# Patient Record
Sex: FEMALE | Race: WHITE | NOT HISPANIC OR LATINO | Employment: UNEMPLOYED | URBAN - METROPOLITAN AREA
[De-identification: names, ages, dates, MRNs, and addresses within clinical notes are randomized per-mention and may not be internally consistent; named-entity substitution may affect disease eponyms.]

---

## 2017-04-10 ENCOUNTER — HOSPITAL ENCOUNTER (OUTPATIENT)
Dept: LAB | Facility: HOSPITAL | Age: 47
Setting detail: SPECIMEN
Discharge: HOME | End: 2017-04-10
Attending: INTERNAL MEDICINE | Admitting: INTERNAL MEDICINE

## 2017-04-10 LAB
ALBUMIN SERPL-MCNC: 3.6 G/DL (ref 3.5–4.8)
ALBUMIN/GLOB SERPL: 1.1 {RATIO} (ref 1–1.7)
ALP SERPL-CCNC: 95 IU/L (ref 32–91)
ALT SERPL-CCNC: 20 IU/L (ref 14–54)
ANION GAP SERPL CALC-SCNC: 11.9 MMOL/L (ref 10–20)
AST SERPL-CCNC: 19 IU/L (ref 15–41)
BILIRUB SERPL-MCNC: 0.4 MG/DL (ref 0.3–1.2)
BUN SERPL-MCNC: 8 MG/DL (ref 8–20)
BUN/CREAT SERPL: 8.9 (ref 5.4–26.2)
CALCIUM SERPL-MCNC: 9.4 MG/DL (ref 8.9–10.3)
CHLORIDE SERPL-SCNC: 108 MMOL/L (ref 101–111)
CHOLEST SERPL-MCNC: 182 MG/DL
CHOLEST/HDLC SERPL: 3.5 {RATIO}
CONV CO2: 27 MMOL/L (ref 22–32)
CONV LDL CHOLESTEROL DIRECT: 96 MG/DL (ref 0–100)
CONV MICROALBUM.,U,RANDOM: 2 MG/L
CONV TOTAL PROTEIN: 6.9 G/DL (ref 6.1–7.9)
CREAT 24H UR-MCNC: 192.1 MG/DL
CREAT UR-MCNC: 0.9 MG/DL (ref 0.4–1)
GLOBULIN UR ELPH-MCNC: 3.3 G/DL (ref 2.5–3.8)
GLUCOSE SERPL-MCNC: 112 MG/DL (ref 65–99)
HDLC SERPL-MCNC: 53 MG/DL
LDLC/HDLC SERPL: 1.8 {RATIO}
LIPID INTERPRETATION: ABNORMAL
MICROALBUMIN/CREAT UR: 1 UG/MG
POTASSIUM SERPL-SCNC: 3.9 MMOL/L (ref 3.6–5.1)
SODIUM SERPL-SCNC: 143 MMOL/L (ref 136–144)
TRIGL SERPL-MCNC: 279 MG/DL
TSH SERPL-ACNC: 5.41 UIU/ML (ref 0.34–5.6)
VLDLC SERPL CALC-MCNC: 33.1 MG/DL

## 2017-04-19 ENCOUNTER — CONVERSION ENCOUNTER (OUTPATIENT)
Dept: ENDOCRINOLOGY | Facility: CLINIC | Age: 47
End: 2017-04-19

## 2017-04-20 ENCOUNTER — APPOINTMENT (OUTPATIENT)
Dept: WOMENS IMAGING | Facility: HOSPITAL | Age: 47
End: 2017-04-20

## 2017-04-20 PROCEDURE — 77067 SCR MAMMO BI INCL CAD: CPT | Performed by: RADIOLOGY

## 2017-10-10 ENCOUNTER — HOSPITAL ENCOUNTER (OUTPATIENT)
Dept: LAB | Facility: HOSPITAL | Age: 47
Setting detail: SPECIMEN
Discharge: HOME | End: 2017-10-10
Attending: INTERNAL MEDICINE | Admitting: INTERNAL MEDICINE

## 2017-10-10 LAB
T4 FREE SERPL-MCNC: 0.84 NG/DL (ref 0.58–1.64)
TSH SERPL-ACNC: 2.42 UIU/ML (ref 0.34–5.6)

## 2017-10-17 ENCOUNTER — CONVERSION ENCOUNTER (OUTPATIENT)
Dept: ENDOCRINOLOGY | Facility: CLINIC | Age: 47
End: 2017-10-17

## 2017-10-30 ENCOUNTER — APPOINTMENT (OUTPATIENT)
Dept: PREADMISSION TESTING | Facility: HOSPITAL | Age: 47
End: 2017-10-30

## 2017-10-31 ENCOUNTER — APPOINTMENT (OUTPATIENT)
Dept: PREADMISSION TESTING | Facility: HOSPITAL | Age: 47
End: 2017-10-31

## 2017-10-31 VITALS
RESPIRATION RATE: 20 BRPM | BODY MASS INDEX: 32.07 KG/M2 | SYSTOLIC BLOOD PRESSURE: 103 MMHG | HEART RATE: 79 BPM | TEMPERATURE: 97.8 F | HEIGHT: 69 IN | WEIGHT: 216.5 LBS | DIASTOLIC BLOOD PRESSURE: 71 MMHG | OXYGEN SATURATION: 95 %

## 2017-10-31 LAB
ANION GAP SERPL CALCULATED.3IONS-SCNC: 18.1 MMOL/L
BASOPHILS # BLD AUTO: 0.01 10*3/MM3 (ref 0–0.2)
BASOPHILS NFR BLD AUTO: 0.1 % (ref 0–1.5)
BUN BLD-MCNC: 10 MG/DL (ref 6–20)
BUN/CREAT SERPL: 13 (ref 7–25)
CALCIUM SPEC-SCNC: 9.1 MG/DL (ref 8.6–10.5)
CHLORIDE SERPL-SCNC: 97 MMOL/L (ref 98–107)
CO2 SERPL-SCNC: 22.9 MMOL/L (ref 22–29)
CREAT BLD-MCNC: 0.77 MG/DL (ref 0.57–1)
DEPRECATED RDW RBC AUTO: 41.6 FL (ref 37–54)
EOSINOPHIL # BLD AUTO: 0 10*3/MM3 (ref 0–0.7)
EOSINOPHIL NFR BLD AUTO: 0 % (ref 0.3–6.2)
ERYTHROCYTE [DISTWIDTH] IN BLOOD BY AUTOMATED COUNT: 13.5 % (ref 11.7–13)
GFR SERPL CREATININE-BSD FRML MDRD: 80 ML/MIN/1.73
GLUCOSE BLD-MCNC: 207 MG/DL (ref 65–99)
HCG SERPL QL: NEGATIVE
HCT VFR BLD AUTO: 39.1 % (ref 35.6–45.5)
HGB BLD-MCNC: 13 G/DL (ref 11.9–15.5)
IMM GRANULOCYTES # BLD: 0.04 10*3/MM3 (ref 0–0.03)
IMM GRANULOCYTES NFR BLD: 0.4 % (ref 0–0.5)
LYMPHOCYTES # BLD AUTO: 5.74 10*3/MM3 (ref 0.9–4.8)
LYMPHOCYTES NFR BLD AUTO: 50.3 % (ref 19.6–45.3)
MCH RBC QN AUTO: 28.4 PG (ref 26.9–32)
MCHC RBC AUTO-ENTMCNC: 33.2 G/DL (ref 32.4–36.3)
MCV RBC AUTO: 85.4 FL (ref 80.5–98.2)
MONOCYTES # BLD AUTO: 0.6 10*3/MM3 (ref 0.2–1.2)
MONOCYTES NFR BLD AUTO: 5.3 % (ref 5–12)
NEUTROPHILS # BLD AUTO: 5.02 10*3/MM3 (ref 1.9–8.1)
NEUTROPHILS NFR BLD AUTO: 43.9 % (ref 42.7–76)
PLATELET # BLD AUTO: 386 10*3/MM3 (ref 140–500)
PMV BLD AUTO: 9.5 FL (ref 6–12)
POTASSIUM BLD-SCNC: 3.9 MMOL/L (ref 3.5–5.2)
RBC # BLD AUTO: 4.58 10*6/MM3 (ref 3.9–5.2)
SODIUM BLD-SCNC: 138 MMOL/L (ref 136–145)
WBC NRBC COR # BLD: 11.41 10*3/MM3 (ref 4.5–10.7)

## 2017-10-31 PROCEDURE — 93010 ELECTROCARDIOGRAM REPORT: CPT | Performed by: INTERNAL MEDICINE

## 2017-10-31 RX ORDER — ATORVASTATIN CALCIUM 40 MG/1
40 TABLET, FILM COATED ORAL DAILY
COMMUNITY
End: 2021-08-30

## 2017-10-31 RX ORDER — LAMOTRIGINE 200 MG/1
200 TABLET ORAL NIGHTLY
COMMUNITY
End: 2022-08-03

## 2017-10-31 RX ORDER — CLONAZEPAM 1 MG/1
1 TABLET ORAL 2 TIMES DAILY PRN
COMMUNITY

## 2017-10-31 RX ORDER — ZOLPIDEM TARTRATE 10 MG/1
20 TABLET ORAL NIGHTLY PRN
COMMUNITY

## 2017-11-01 ENCOUNTER — HOSPITAL ENCOUNTER (OUTPATIENT)
Facility: HOSPITAL | Age: 47
Setting detail: HOSPITAL OUTPATIENT SURGERY
Discharge: HOME | End: 2017-11-01
Attending: OBSTETRICS & GYNECOLOGY | Admitting: OBSTETRICS & GYNECOLOGY

## 2017-11-01 ENCOUNTER — ANESTHESIA EVENT (OUTPATIENT)
Dept: PERIOP | Facility: HOSPITAL | Age: 47
End: 2017-11-01

## 2017-11-01 ENCOUNTER — ANESTHESIA (OUTPATIENT)
Dept: PERIOP | Facility: HOSPITAL | Age: 47
End: 2017-11-01

## 2017-11-01 VITALS
OXYGEN SATURATION: 95 % | RESPIRATION RATE: 18 BRPM | DIASTOLIC BLOOD PRESSURE: 65 MMHG | SYSTOLIC BLOOD PRESSURE: 119 MMHG | HEART RATE: 99 BPM | TEMPERATURE: 99.2 F

## 2017-11-01 DIAGNOSIS — R10.2 PAIN IN PELVIS: ICD-10-CM

## 2017-11-01 LAB
GLUCOSE BLDC GLUCOMTR-MCNC: 105 MG/DL (ref 70–130)
GLUCOSE BLDC GLUCOMTR-MCNC: 264 MG/DL (ref 70–130)
GLUCOSE BLDC GLUCOMTR-MCNC: 55 MG/DL (ref 70–130)
GLUCOSE BLDC GLUCOMTR-MCNC: 95 MG/DL (ref 70–130)

## 2017-11-01 PROCEDURE — 25010000002 MIDAZOLAM PER 1 MG

## 2017-11-01 PROCEDURE — 25010000003 CEFAZOLIN IN DEXTROSE 2-4 GM/100ML-% SOLUTION: Performed by: NURSE ANESTHETIST, CERTIFIED REGISTERED

## 2017-11-01 PROCEDURE — 25010000002 KETOROLAC TROMETHAMINE PER 15 MG: Performed by: NURSE ANESTHETIST, CERTIFIED REGISTERED

## 2017-11-01 PROCEDURE — 25010000002 NEOSTIGMINE PER 0.5 MG: Performed by: NURSE ANESTHETIST, CERTIFIED REGISTERED

## 2017-11-01 PROCEDURE — 25010000002 HYDROMORPHONE PER 4 MG: Performed by: ANESTHESIOLOGY

## 2017-11-01 PROCEDURE — 88304 TISSUE EXAM BY PATHOLOGIST: CPT | Performed by: OBSTETRICS & GYNECOLOGY

## 2017-11-01 PROCEDURE — 25010000002 PROPOFOL 10 MG/ML EMULSION: Performed by: NURSE ANESTHETIST, CERTIFIED REGISTERED

## 2017-11-01 PROCEDURE — 25010000002 FENTANYL CITRATE (PF) 100 MCG/2ML SOLUTION: Performed by: NURSE ANESTHETIST, CERTIFIED REGISTERED

## 2017-11-01 PROCEDURE — 25010000002 HYDROMORPHONE PER 4 MG: Performed by: NURSE ANESTHETIST, CERTIFIED REGISTERED

## 2017-11-01 PROCEDURE — 82962 GLUCOSE BLOOD TEST: CPT

## 2017-11-01 PROCEDURE — 25010000002 ONDANSETRON PER 1 MG: Performed by: NURSE ANESTHETIST, CERTIFIED REGISTERED

## 2017-11-01 RX ORDER — MAGNESIUM HYDROXIDE 1200 MG/15ML
LIQUID ORAL AS NEEDED
Status: DISCONTINUED | OUTPATIENT
Start: 2017-11-01 | End: 2017-11-01 | Stop reason: HOSPADM

## 2017-11-01 RX ORDER — HYDROCODONE BITARTRATE AND ACETAMINOPHEN 7.5; 325 MG/1; MG/1
1 TABLET ORAL ONCE AS NEEDED
Status: DISCONTINUED | OUTPATIENT
Start: 2017-11-01 | End: 2017-11-01

## 2017-11-01 RX ORDER — SODIUM CHLORIDE, SODIUM LACTATE, POTASSIUM CHLORIDE, CALCIUM CHLORIDE 600; 310; 30; 20 MG/100ML; MG/100ML; MG/100ML; MG/100ML
9 INJECTION, SOLUTION INTRAVENOUS CONTINUOUS
Status: DISCONTINUED | OUTPATIENT
Start: 2017-11-01 | End: 2017-11-01 | Stop reason: HOSPADM

## 2017-11-01 RX ORDER — MIDAZOLAM HYDROCHLORIDE 1 MG/ML
1 INJECTION INTRAMUSCULAR; INTRAVENOUS
Status: DISCONTINUED | OUTPATIENT
Start: 2017-11-01 | End: 2017-11-01 | Stop reason: HOSPADM

## 2017-11-01 RX ORDER — FENTANYL CITRATE 50 UG/ML
50 INJECTION, SOLUTION INTRAMUSCULAR; INTRAVENOUS
Status: DISCONTINUED | OUTPATIENT
Start: 2017-11-01 | End: 2017-11-01 | Stop reason: HOSPADM

## 2017-11-01 RX ORDER — DIPHENHYDRAMINE HYDROCHLORIDE 50 MG/ML
12.5 INJECTION INTRAMUSCULAR; INTRAVENOUS
Status: DISCONTINUED | OUTPATIENT
Start: 2017-11-01 | End: 2017-11-01 | Stop reason: HOSPADM

## 2017-11-01 RX ORDER — HYDRALAZINE HYDROCHLORIDE 20 MG/ML
5 INJECTION INTRAMUSCULAR; INTRAVENOUS
Status: DISCONTINUED | OUTPATIENT
Start: 2017-11-01 | End: 2017-11-01 | Stop reason: HOSPADM

## 2017-11-01 RX ORDER — PROMETHAZINE HYDROCHLORIDE 25 MG/1
12.5 TABLET ORAL ONCE AS NEEDED
Status: DISCONTINUED | OUTPATIENT
Start: 2017-11-01 | End: 2017-11-01 | Stop reason: HOSPADM

## 2017-11-01 RX ORDER — MIDAZOLAM HYDROCHLORIDE 1 MG/ML
2 INJECTION INTRAMUSCULAR; INTRAVENOUS
Status: DISCONTINUED | OUTPATIENT
Start: 2017-11-01 | End: 2017-11-01 | Stop reason: HOSPADM

## 2017-11-01 RX ORDER — KETOROLAC TROMETHAMINE 30 MG/ML
INJECTION, SOLUTION INTRAMUSCULAR; INTRAVENOUS AS NEEDED
Status: DISCONTINUED | OUTPATIENT
Start: 2017-11-01 | End: 2017-11-01 | Stop reason: SURG

## 2017-11-01 RX ORDER — SCOLOPAMINE TRANSDERMAL SYSTEM 1 MG/1
PATCH, EXTENDED RELEASE TRANSDERMAL
Status: DISCONTINUED
Start: 2017-11-01 | End: 2017-11-01 | Stop reason: HOSPADM

## 2017-11-01 RX ORDER — OXYCODONE HYDROCHLORIDE AND ACETAMINOPHEN 5; 325 MG/1; MG/1
1-2 TABLET ORAL EVERY 4 HOURS PRN
Qty: 25 TABLET | Refills: 0 | Status: SHIPPED | OUTPATIENT
Start: 2017-11-01 | End: 2018-07-05

## 2017-11-01 RX ORDER — CEFAZOLIN SODIUM 2 G/100ML
INJECTION, SOLUTION INTRAVENOUS AS NEEDED
Status: DISCONTINUED | OUTPATIENT
Start: 2017-11-01 | End: 2017-11-01 | Stop reason: SURG

## 2017-11-01 RX ORDER — ROCURONIUM BROMIDE 10 MG/ML
INJECTION, SOLUTION INTRAVENOUS AS NEEDED
Status: DISCONTINUED | OUTPATIENT
Start: 2017-11-01 | End: 2017-11-01 | Stop reason: SURG

## 2017-11-01 RX ORDER — FLUMAZENIL 0.1 MG/ML
0.2 INJECTION INTRAVENOUS AS NEEDED
Status: DISCONTINUED | OUTPATIENT
Start: 2017-11-01 | End: 2017-11-01 | Stop reason: HOSPADM

## 2017-11-01 RX ORDER — FENTANYL CITRATE 50 UG/ML
INJECTION, SOLUTION INTRAMUSCULAR; INTRAVENOUS AS NEEDED
Status: DISCONTINUED | OUTPATIENT
Start: 2017-11-01 | End: 2017-11-01 | Stop reason: SURG

## 2017-11-01 RX ORDER — ONDANSETRON 2 MG/ML
INJECTION INTRAMUSCULAR; INTRAVENOUS AS NEEDED
Status: DISCONTINUED | OUTPATIENT
Start: 2017-11-01 | End: 2017-11-01 | Stop reason: SURG

## 2017-11-01 RX ORDER — LABETALOL HYDROCHLORIDE 5 MG/ML
5 INJECTION, SOLUTION INTRAVENOUS
Status: DISCONTINUED | OUTPATIENT
Start: 2017-11-01 | End: 2017-11-01 | Stop reason: HOSPADM

## 2017-11-01 RX ORDER — OXYCODONE AND ACETAMINOPHEN 7.5; 325 MG/1; MG/1
1 TABLET ORAL ONCE AS NEEDED
Status: COMPLETED | OUTPATIENT
Start: 2017-11-01 | End: 2017-11-01

## 2017-11-01 RX ORDER — LIDOCAINE HYDROCHLORIDE 10 MG/ML
INJECTION, SOLUTION INFILTRATION; PERINEURAL AS NEEDED
Status: DISCONTINUED | OUTPATIENT
Start: 2017-11-01 | End: 2017-11-01 | Stop reason: HOSPADM

## 2017-11-01 RX ORDER — HYDROMORPHONE HYDROCHLORIDE 1 MG/ML
0.5 INJECTION, SOLUTION INTRAMUSCULAR; INTRAVENOUS; SUBCUTANEOUS
Status: DISCONTINUED | OUTPATIENT
Start: 2017-11-01 | End: 2017-11-01 | Stop reason: HOSPADM

## 2017-11-01 RX ORDER — PROMETHAZINE HYDROCHLORIDE 25 MG/1
25 TABLET ORAL ONCE AS NEEDED
Status: DISCONTINUED | OUTPATIENT
Start: 2017-11-01 | End: 2017-11-01 | Stop reason: HOSPADM

## 2017-11-01 RX ORDER — SODIUM CHLORIDE 0.9 % (FLUSH) 0.9 %
1-10 SYRINGE (ML) INJECTION AS NEEDED
Status: DISCONTINUED | OUTPATIENT
Start: 2017-11-01 | End: 2017-11-01 | Stop reason: HOSPADM

## 2017-11-01 RX ORDER — PROMETHAZINE HYDROCHLORIDE 25 MG/ML
12.5 INJECTION, SOLUTION INTRAMUSCULAR; INTRAVENOUS ONCE AS NEEDED
Status: DISCONTINUED | OUTPATIENT
Start: 2017-11-01 | End: 2017-11-01 | Stop reason: HOSPADM

## 2017-11-01 RX ORDER — EPHEDRINE SULFATE 50 MG/ML
5 INJECTION, SOLUTION INTRAVENOUS ONCE AS NEEDED
Status: DISCONTINUED | OUTPATIENT
Start: 2017-11-01 | End: 2017-11-01 | Stop reason: HOSPADM

## 2017-11-01 RX ORDER — ONDANSETRON 2 MG/ML
4 INJECTION INTRAMUSCULAR; INTRAVENOUS ONCE AS NEEDED
Status: DISCONTINUED | OUTPATIENT
Start: 2017-11-01 | End: 2017-11-01 | Stop reason: HOSPADM

## 2017-11-01 RX ORDER — NALOXONE HCL 0.4 MG/ML
0.2 VIAL (ML) INJECTION AS NEEDED
Status: DISCONTINUED | OUTPATIENT
Start: 2017-11-01 | End: 2017-11-01 | Stop reason: HOSPADM

## 2017-11-01 RX ORDER — LIDOCAINE HYDROCHLORIDE 20 MG/ML
INJECTION, SOLUTION INFILTRATION; PERINEURAL AS NEEDED
Status: DISCONTINUED | OUTPATIENT
Start: 2017-11-01 | End: 2017-11-01 | Stop reason: SURG

## 2017-11-01 RX ORDER — FAMOTIDINE 10 MG/ML
20 INJECTION, SOLUTION INTRAVENOUS ONCE
Status: COMPLETED | OUTPATIENT
Start: 2017-11-01 | End: 2017-11-01

## 2017-11-01 RX ORDER — PROMETHAZINE HYDROCHLORIDE 25 MG/1
25 SUPPOSITORY RECTAL ONCE AS NEEDED
Status: DISCONTINUED | OUTPATIENT
Start: 2017-11-01 | End: 2017-11-01 | Stop reason: HOSPADM

## 2017-11-01 RX ORDER — MIDAZOLAM HYDROCHLORIDE 1 MG/ML
INJECTION INTRAMUSCULAR; INTRAVENOUS
Status: COMPLETED
Start: 2017-11-01 | End: 2017-11-01

## 2017-11-01 RX ORDER — PROPOFOL 10 MG/ML
VIAL (ML) INTRAVENOUS AS NEEDED
Status: DISCONTINUED | OUTPATIENT
Start: 2017-11-01 | End: 2017-11-01 | Stop reason: SURG

## 2017-11-01 RX ORDER — SCOLOPAMINE TRANSDERMAL SYSTEM 1 MG/1
1 PATCH, EXTENDED RELEASE TRANSDERMAL ONCE
Status: DISCONTINUED | OUTPATIENT
Start: 2017-11-01 | End: 2017-11-01 | Stop reason: HOSPADM

## 2017-11-01 RX ORDER — HYDROCODONE BITARTRATE AND ACETAMINOPHEN 7.5; 325 MG/1; MG/1
1 TABLET ORAL ONCE AS NEEDED
Status: DISCONTINUED | OUTPATIENT
Start: 2017-11-01 | End: 2017-11-01 | Stop reason: HOSPADM

## 2017-11-01 RX ORDER — FAMOTIDINE 10 MG/ML
INJECTION, SOLUTION INTRAVENOUS
Status: COMPLETED
Start: 2017-11-01 | End: 2017-11-01

## 2017-11-01 RX ADMIN — ONDANSETRON 4 MG: 2 INJECTION INTRAMUSCULAR; INTRAVENOUS at 09:44

## 2017-11-01 RX ADMIN — FENTANYL CITRATE 50 MCG: 50 INJECTION INTRAMUSCULAR; INTRAVENOUS at 10:21

## 2017-11-01 RX ADMIN — LIDOCAINE HYDROCHLORIDE 60 MG: 20 INJECTION, SOLUTION INFILTRATION; PERINEURAL at 08:58

## 2017-11-01 RX ADMIN — KETOROLAC TROMETHAMINE 30 MG: 30 INJECTION, SOLUTION INTRAMUSCULAR; INTRAVENOUS at 09:44

## 2017-11-01 RX ADMIN — FENTANYL CITRATE 50 MCG: 50 INJECTION INTRAMUSCULAR; INTRAVENOUS at 10:06

## 2017-11-01 RX ADMIN — FAMOTIDINE 20 MG: 10 INJECTION, SOLUTION INTRAVENOUS at 08:06

## 2017-11-01 RX ADMIN — HYDROMORPHONE HYDROCHLORIDE 0.5 MG: 1 INJECTION, SOLUTION INTRAMUSCULAR; INTRAVENOUS; SUBCUTANEOUS at 10:57

## 2017-11-01 RX ADMIN — OXYCODONE HYDROCHLORIDE AND ACETAMINOPHEN 1 TABLET: 7.5; 325 TABLET ORAL at 11:55

## 2017-11-01 RX ADMIN — FENTANYL CITRATE 50 MCG: 50 INJECTION INTRAMUSCULAR; INTRAVENOUS at 10:02

## 2017-11-01 RX ADMIN — MIDAZOLAM 2 MG: 1 INJECTION INTRAMUSCULAR; INTRAVENOUS at 08:06

## 2017-11-01 RX ADMIN — OXYCODONE HYDROCHLORIDE AND ACETAMINOPHEN 1 TABLET: 7.5; 325 TABLET ORAL at 10:21

## 2017-11-01 RX ADMIN — FENTANYL CITRATE 50 MCG: 50 INJECTION INTRAMUSCULAR; INTRAVENOUS at 09:40

## 2017-11-01 RX ADMIN — HYDROMORPHONE HYDROCHLORIDE 0.5 MG: 1 INJECTION, SOLUTION INTRAMUSCULAR; INTRAVENOUS; SUBCUTANEOUS at 11:52

## 2017-11-01 RX ADMIN — NEOSTIGMINE METHYLSULFATE 0.6 MG: 1 INJECTION INTRAMUSCULAR; INTRAVENOUS; SUBCUTANEOUS at 09:55

## 2017-11-01 RX ADMIN — SODIUM CHLORIDE, POTASSIUM CHLORIDE, SODIUM LACTATE AND CALCIUM CHLORIDE: 600; 310; 30; 20 INJECTION, SOLUTION INTRAVENOUS at 09:41

## 2017-11-01 RX ADMIN — SCOPALAMINE 1 PATCH: 1 PATCH, EXTENDED RELEASE TRANSDERMAL at 08:06

## 2017-11-01 RX ADMIN — SCOLOPAMINE TRANSDERMAL SYSTEM 1 PATCH: 1 PATCH, EXTENDED RELEASE TRANSDERMAL at 08:06

## 2017-11-01 RX ADMIN — FENTANYL CITRATE 50 MCG: 50 INJECTION INTRAMUSCULAR; INTRAVENOUS at 08:57

## 2017-11-01 RX ADMIN — HYDROMORPHONE HYDROCHLORIDE 0.5 MG: 1 INJECTION, SOLUTION INTRAMUSCULAR; INTRAVENOUS; SUBCUTANEOUS at 10:36

## 2017-11-01 RX ADMIN — PROPOFOL 180 MG: 10 INJECTION, EMULSION INTRAVENOUS at 08:58

## 2017-11-01 RX ADMIN — MIDAZOLAM HYDROCHLORIDE 2 MG: 1 INJECTION INTRAMUSCULAR; INTRAVENOUS at 08:06

## 2017-11-01 RX ADMIN — HYDROMORPHONE HYDROCHLORIDE 0.5 MG: 1 INJECTION, SOLUTION INTRAMUSCULAR; INTRAVENOUS; SUBCUTANEOUS at 10:47

## 2017-11-01 RX ADMIN — HYDROMORPHONE HYDROCHLORIDE 0.5 MG: 1 INJECTION, SOLUTION INTRAMUSCULAR; INTRAVENOUS; SUBCUTANEOUS at 11:13

## 2017-11-01 RX ADMIN — SODIUM CHLORIDE, POTASSIUM CHLORIDE, SODIUM LACTATE AND CALCIUM CHLORIDE 9 ML/HR: 600; 310; 30; 20 INJECTION, SOLUTION INTRAVENOUS at 07:28

## 2017-11-01 RX ADMIN — CEFAZOLIN SODIUM 2 G: 2 INJECTION, SOLUTION INTRAVENOUS at 08:55

## 2017-11-01 RX ADMIN — FENTANYL CITRATE 50 MCG: 50 INJECTION INTRAMUSCULAR; INTRAVENOUS at 10:32

## 2017-11-01 RX ADMIN — ROCURONIUM BROMIDE 25 MG: 10 INJECTION INTRAVENOUS at 08:58

## 2017-11-01 NOTE — PERIOPERATIVE NURSING NOTE
Pt is shaky and asking to have her blood sugar checked and it is 55. Pt put insulin on hold. Apple juice x2 and peanut butter crackers x 2 given plus sprite.

## 2017-11-01 NOTE — ANESTHESIA PROCEDURE NOTES
Airway  Urgency: elective    Date/Time: 11/1/2017 9:01 AM  Airway not difficult    General Information and Staff    Patient location during procedure: OR  Anesthesiologist: RENDER, FAWAD RAY  CRNA: JUN SERNA    Indications and Patient Condition  Indications for airway management: airway protection    Preoxygenated: yes  Mask difficulty assessment: 1 - vent by mask    Final Airway Details  Final airway type: endotracheal airway      Successful airway: ETT  Cuffed: yes   Successful intubation technique: direct laryngoscopy  Endotracheal tube insertion site: oral  Blade: Burton  Blade size: #3  ETT size: 7.0 mm  Cormack-Lehane Classification: grade I - full view of glottis  Placement verified by: chest auscultation and capnometry   Measured from: lips  ETT to lips (cm): 21  Number of attempts at approach: 1    Additional Comments  Pre 02, sivi,, easy bvm, dlx1, dvvc, intubation x 1 attempt, +etc02, +bebs, appears atraumatic, teeth unchanged

## 2017-11-01 NOTE — PLAN OF CARE
Problem: Patient Care Overview (Adult)  Goal: Plan of Care Review  Outcome: Outcome(s) achieved Date Met:  11/01/17  Goal: Discharge Needs Assessment  Outcome: Outcome(s) achieved Date Met:  11/01/17    Problem: Perioperative Period (Adult)  Goal: Signs and Symptoms of Listed Potential Problems Will be Absent or Manageable (Perioperative Period)  Outcome: Outcome(s) achieved Date Met:  11/01/17

## 2017-11-01 NOTE — PERIOPERATIVE NURSING NOTE
Dr smith at bedside, pt remains with a pain level of 7/10 after 200mcg fentanyl and toradol intraop., additional 100 mcg fentanyl. 2 mg dilaudid and one percocet 7.5mg in pacu.  Vss, non-indicative of increase in pain.  Orders given to administer additional percocet and dilaudid.

## 2017-11-01 NOTE — PLAN OF CARE
Problem: Perioperative Period (Adult)  Goal: Signs and Symptoms of Listed Potential Problems Will be Absent or Manageable (Perioperative Period)  Outcome: Ongoing (interventions implemented as appropriate)    11/01/17 0643   Perioperative Period   Problems Assessed (Perioperative Period) all   Problems Present (Perioperative Period) none

## 2017-11-01 NOTE — PLAN OF CARE
Problem: Patient Care Overview (Adult)  Goal: Plan of Care Review  Outcome: Ongoing (interventions implemented as appropriate)    11/01/17 1224   Coping/Psychosocial Response Interventions   Plan Of Care Reviewed With patient   Patient Care Overview   Progress improving   Outcome Evaluation   Outcome Summary/Follow up Plan considerable pain, labile blood sugars, 105 to 55, recheck before leaving. pain improving, vss, abdomen soft       Goal: Adult Individualization and Mutuality  Outcome: Ongoing (interventions implemented as appropriate)  Goal: Discharge Needs Assessment  Outcome: Ongoing (interventions implemented as appropriate)    11/01/17 1224   Discharge Needs Assessment   Concerns To Be Addressed no discharge needs identified         Problem: Perioperative Period (Adult)  Goal: Signs and Symptoms of Listed Potential Problems Will be Absent or Manageable (Perioperative Period)  Outcome: Ongoing (interventions implemented as appropriate)    11/01/17 1224   Perioperative Period   Problems Assessed (Perioperative Period) all

## 2017-11-01 NOTE — PLAN OF CARE
Problem: Patient Care Overview (Adult)  Goal: Plan of Care Review  Outcome: Ongoing (interventions implemented as appropriate)    11/01/17 0643   Coping/Psychosocial Response Interventions   Plan Of Care Reviewed With patient   Patient Care Overview   Progress improving       Goal: Discharge Needs Assessment  Outcome: Ongoing (interventions implemented as appropriate)    11/01/17 0643   Discharge Needs Assessment   Concerns To Be Addressed no discharge needs identified   Equipment Needed After Discharge none   Self-Care   Equipment Currently Used at Home none

## 2017-11-01 NOTE — PERIOPERATIVE NURSING NOTE
Pt states she is feeling better, shakiness abated, pain level is tolerable.  Vital signs stable. Abdomen soft.  Lap sites dry.

## 2017-11-01 NOTE — ANESTHESIA PREPROCEDURE EVALUATION
Anesthesia Evaluation     Patient summary reviewed and Nursing notes reviewed   NPO Solid Status: > 8 hours  NPO Liquid Status: > 2 hours     Airway   Mallampati: II  no difficulty expected  Comment: Tongue ring  Dental - normal exam     Pulmonary     breath sounds clear to auscultation  (+) a smoker Former,   Cardiovascular     ECG reviewed  Rhythm: regular  Rate: normal    (+) hyperlipidemia      Neuro/Psych  GI/Hepatic/Renal/Endo    (+) obesity,  diabetes mellitus type 1,     ROS Comment: pump    Musculoskeletal     Abdominal    Substance History      OB/GYN          Other                                        Anesthesia Plan    ASA 3     general     intravenous induction   Anesthetic plan and risks discussed with patient.    Insulin pump on--BS = 246; no bolus as BS too low last procedure

## 2017-11-01 NOTE — OP NOTE
Pre op... Pelvic pain    Post op.. Same    Surgeon forest roa    Procedure.. .lsc left partial salpingectomy, left ovarian cyst aspiration    Taken to or. Given 2gms of kefzol. Placed in dorsal lithotomy position. quinn marcos as uterine manipulator. Pt with hx of hernia repair with mesh. Incision made superior to umbilicus, fascia identified, mesh immediately noted.  Decision made to have a luq approach.  Immediately inferior to the rib 3cm from the midline an incision was made. veress needle introduced and pressure was 6mmhg. co2 insullfalated. Using obturator with 5mm scope, the abdomen was easily entered. Ng tube had been placed. Laparoscope introduced. 2 5mm trocars placed in lower pelvis. Left fallopian tube was noted to be convoluated and it was removed. Small clear, simple cyst noted and it was aspirated. Otherwise pelvis normal. There were omental/bowel adhesions to the umbilicus. Instrument count correct.     KMM

## 2017-11-01 NOTE — ANESTHESIA POSTPROCEDURE EVALUATION
Patient: Karly Zayas    Procedure Summary     Date Anesthesia Start Anesthesia Stop Room / Location    11/01/17 0855 1008  [facility]  /  [facility]       Procedure Diagnosis Surgeon Provider    LAPAROSCOPY, PARTIAL LEFT SALPINGECTOMY (N/A Abdomen) No diagnosis on file. MD Brian Noyola MD          Anesthesia Type: general  Last vitals  BP   127/74 (11/01/17 1100)   Temp   36.7 °C (98 °F) (11/01/17 1005)   Pulse   82 (11/01/17 1100)   Resp   16 (11/01/17 1100)     SpO2   99 % (11/01/17 1100)     Post Anesthesia Care and Evaluation    Patient location during evaluation: bedside  Patient participation: complete - patient participated  Level of consciousness: awake  Pain score: 2  Pain management: adequate  Airway patency: patent  Anesthetic complications: No anesthetic complications    Cardiovascular status: acceptable  Respiratory status: acceptable  Hydration status: acceptable    Comments: /74  Pulse 82  Temp 36.7 °C (98 °F) (Temporal Artery )   Resp 16  SpO2 99%

## 2017-11-02 LAB
CYTO UR: NORMAL
LAB AP CASE REPORT: NORMAL
Lab: NORMAL
PATH REPORT.FINAL DX SPEC: NORMAL
PATH REPORT.GROSS SPEC: NORMAL

## 2018-07-05 ENCOUNTER — APPOINTMENT (OUTPATIENT)
Dept: PREADMISSION TESTING | Facility: HOSPITAL | Age: 48
End: 2018-07-05

## 2018-07-05 VITALS
HEART RATE: 100 BPM | TEMPERATURE: 98 F | OXYGEN SATURATION: 97 % | HEIGHT: 69 IN | WEIGHT: 191 LBS | DIASTOLIC BLOOD PRESSURE: 82 MMHG | BODY MASS INDEX: 28.29 KG/M2 | SYSTOLIC BLOOD PRESSURE: 130 MMHG | RESPIRATION RATE: 16 BRPM

## 2018-07-05 LAB
ANION GAP SERPL CALCULATED.3IONS-SCNC: 14.1 MMOL/L
BASOPHILS # BLD AUTO: 0.01 10*3/MM3 (ref 0–0.2)
BASOPHILS NFR BLD AUTO: 0.2 % (ref 0–1.5)
BUN BLD-MCNC: 11 MG/DL (ref 6–20)
BUN/CREAT SERPL: 12.6 (ref 7–25)
CALCIUM SPEC-SCNC: 9.2 MG/DL (ref 8.6–10.5)
CHLORIDE SERPL-SCNC: 96 MMOL/L (ref 98–107)
CO2 SERPL-SCNC: 24.9 MMOL/L (ref 22–29)
CREAT BLD-MCNC: 0.87 MG/DL (ref 0.57–1)
DEPRECATED RDW RBC AUTO: 39.2 FL (ref 37–54)
EOSINOPHIL # BLD AUTO: 0 10*3/MM3 (ref 0–0.7)
EOSINOPHIL NFR BLD AUTO: 0 % (ref 0.3–6.2)
ERYTHROCYTE [DISTWIDTH] IN BLOOD BY AUTOMATED COUNT: 12.9 % (ref 11.7–13)
GFR SERPL CREATININE-BSD FRML MDRD: 70 ML/MIN/1.73
GLUCOSE BLD-MCNC: 362 MG/DL (ref 65–99)
HCG SERPL QL: NEGATIVE
HCT VFR BLD AUTO: 42.3 % (ref 35.6–45.5)
HGB BLD-MCNC: 14.3 G/DL (ref 11.9–15.5)
IMM GRANULOCYTES # BLD: 0.02 10*3/MM3 (ref 0–0.03)
IMM GRANULOCYTES NFR BLD: 0.3 % (ref 0–0.5)
LYMPHOCYTES # BLD AUTO: 2.96 10*3/MM3 (ref 0.9–4.8)
LYMPHOCYTES NFR BLD AUTO: 47.9 % (ref 19.6–45.3)
MCH RBC QN AUTO: 27.9 PG (ref 26.9–32)
MCHC RBC AUTO-ENTMCNC: 33.8 G/DL (ref 32.4–36.3)
MCV RBC AUTO: 82.6 FL (ref 80.5–98.2)
MONOCYTES # BLD AUTO: 0.44 10*3/MM3 (ref 0.2–1.2)
MONOCYTES NFR BLD AUTO: 7.1 % (ref 5–12)
NEUTROPHILS # BLD AUTO: 2.77 10*3/MM3 (ref 1.9–8.1)
NEUTROPHILS NFR BLD AUTO: 44.8 % (ref 42.7–76)
PLATELET # BLD AUTO: 359 10*3/MM3 (ref 140–500)
PMV BLD AUTO: 10.8 FL (ref 6–12)
POTASSIUM BLD-SCNC: 4.3 MMOL/L (ref 3.5–5.2)
RBC # BLD AUTO: 5.12 10*6/MM3 (ref 3.9–5.2)
SODIUM BLD-SCNC: 135 MMOL/L (ref 136–145)
WBC NRBC COR # BLD: 6.18 10*3/MM3 (ref 4.5–10.7)

## 2018-07-05 PROCEDURE — 36415 COLL VENOUS BLD VENIPUNCTURE: CPT

## 2018-07-05 PROCEDURE — 93010 ELECTROCARDIOGRAM REPORT: CPT | Performed by: INTERNAL MEDICINE

## 2018-07-05 PROCEDURE — 93005 ELECTROCARDIOGRAM TRACING: CPT

## 2018-07-05 PROCEDURE — 85025 COMPLETE CBC W/AUTO DIFF WBC: CPT | Performed by: OBSTETRICS & GYNECOLOGY

## 2018-07-05 PROCEDURE — 80048 BASIC METABOLIC PNL TOTAL CA: CPT | Performed by: OBSTETRICS & GYNECOLOGY

## 2018-07-05 PROCEDURE — 84703 CHORIONIC GONADOTROPIN ASSAY: CPT | Performed by: OBSTETRICS & GYNECOLOGY

## 2018-07-08 NOTE — H&P
Interval H&P    I have reviewed the H&P from Sh[facility] on 6/13/18 and there are no interval changes noted.  Pt. scheduled for laparoscopic supracervical hysterectomy and frozen D&C.  Place SCDs, IV, and administer Ancef 30-60 min prior to scheduled procedure.  Consent form reviewed, signed, and placed in chart.  Proceed to OR.     Of note: Patient DENIES history of PCN allergy.   Also of note: Patient reports umbilical mesh for repair of umbilical hernia.     Vitals:    07/10/18 0716   BP:    Pulse: 86   Resp: 16   Temp:    SpO2: 95%       Salinas Yanez MD- [facility] Fellow  7/10/2018   7:25 AM

## 2018-07-08 NOTE — OP NOTE
GYN OPERATIVE NOTE     Date of surgery:7/3/2018   Patient ID: Radha Berumen    YOB: 1970   MRN: [id number]     Pre-op Dx:    1) Pelvic pain  2) Dysmenorrhea     Post-op Dx: Same     Procedure:   1) Dilation and curettage   2) Laparoscopic supracervical hysterectomy  3) Right salpingectomy  4) Cystoscopy     Surgeon: Dr. Shannon  Asst: Dr. Buffy BELLO Fellow  Anes: GETA  IVF 1000cc  EBL: 25cc  UOP: 100cc  Findings: Small mobile, anteverted uterus. Surgically absent right fallopian tube and ovary. Normal appearing appendix. Dense omental adhesions to the anterior abdominal wall cephalad to the port placement. On cystoscopy bladder intact and ureters patent bilaterally.    Specimen: uterus and left fallopian tube.   Complications none  Status: Stable to PACU      PROCEDURE IN DETAIL     Patient was taken to the operating room where anesthesia was induced without difficulty. The patient was placed in dorsal lithotomy positioning using angeles stirrups. Prepped and draped in the normal fashion. The skaggs was placed at the beginning of the case without any difficulty. The uterus was sounded and the cervix dilated to allow passage of a curette. Gentle curettage was performed and specimen sent to pathology for frozen which returned benign endometrial and endocervical tissue fragments (see pathology report for details). Decision was made to proceed with hysterectomy. A Wipitlka uterine manipulator was placed without complication.       Attention was then turned to the abdomen. A small skin incision was made superior to the umbilicus and a 5mm trocar was placed under direct visualization. An additional 5mm port was placed in the right lower quadrant and a 10mm port was placed in the left lower quadrant under direct visualization.       A survey of the pelvic revealed findings as above. The pressure was then decreased to 15 mmHg for the remained of the case. Both ureters were observed prior to starting the case.      Attention was turned to the right side where the the harmonic scalpel was used to desiccate and transect the round ligament. The anterior leaf of the broad ligament was carefully taken down using the harmonic scalpel and the bladder flap was developed. The posterior leaf of the broad ligament was taken down to the level of the uterine vessels. The uterine vessels were skeletonized and coagulated and transected using the harmonic scalpel. These vessels were carefully lateralized and hemostasis was noted.      The procedure was repeated on the left side exactly as it was performed on the right side in addition to the salpingectomy. The fallopian tube was removed from the pelvis and attention was then turned to the bladder flap. Dissection of the bladder was performed until visualization of the pubocervical fascia was clearly noted.  At this time we placed the sponge stick anterior to the cervix to delineate the level of the vagina and the specimen was amputated at the level of the internal cervical os. The endocervical canal was coagulated.       The power morcellator was used to morcellate the uterus and the specimen was removed from the abdomen in strips. Of note, the patient was counseled extensively preoperatively, both in clinic and in preoperative holding regarding risk of leiomyosarcoma as well as the risks, benefits and alternatives to use of power morcellation. Discussed that this risk is low, especially given the patient has no history of fibroids but not impossible. She has given both written and verbal consent to proceed with the procedure. (see separate H&P for details). Care was taken to protect the bowel, bladder and surrounding structures. The pelvis and abdomen were surveyed and hemostasis was achieved with the Desmond device. The peritoneum overlying the cervix was re-approximated using an 0- vicryl suture. Interseed was placed over the reapproximated peritoneum for adhesion barrier.      Finally,  cystoscopy was performed and an intact bladder along with bilaterally patent ureters was present. At this point we confirmed that we maintained hemostasis from above.The umbilical site was closed with an 0-vicryl in a running fashion. All laparoscopic instruments were removed from the field and counts were correct x 2     The subcutaneous incisions were closed using 4-0 vicryl. All sponge and needle counts were correct x 2     Dr. Shannon was scrubbed and present throughout the entire procedure     Salinas Yanez MD-[facility]  7/8/2018   1:21 PM

## 2018-07-10 ENCOUNTER — HOSPITAL ENCOUNTER (OUTPATIENT)
Facility: HOSPITAL | Age: 48
Discharge: HOME | End: 2018-07-11
Attending: OBSTETRICS & GYNECOLOGY | Admitting: OBSTETRICS & GYNECOLOGY

## 2018-07-10 ENCOUNTER — ANESTHESIA EVENT (OUTPATIENT)
Dept: PERIOP | Facility: HOSPITAL | Age: 48
End: 2018-07-10

## 2018-07-10 ENCOUNTER — ANESTHESIA (OUTPATIENT)
Dept: PERIOP | Facility: HOSPITAL | Age: 48
End: 2018-07-10

## 2018-07-10 DIAGNOSIS — Z98.890 STATUS POST ENDOMETRIAL ABLATION: ICD-10-CM

## 2018-07-10 DIAGNOSIS — R10.2 PELVIC PAIN: ICD-10-CM

## 2018-07-10 LAB
ANION GAP SERPL CALCULATED.3IONS-SCNC: 10.3 MMOL/L
BASOPHILS # BLD AUTO: 0.01 10*3/MM3 (ref 0–0.2)
BASOPHILS NFR BLD AUTO: 0.1 % (ref 0–1.5)
BUN BLD-MCNC: 10 MG/DL (ref 6–20)
BUN/CREAT SERPL: 12.3 (ref 7–25)
CALCIUM SPEC-SCNC: 8.2 MG/DL (ref 8.6–10.5)
CHLORIDE SERPL-SCNC: 101 MMOL/L (ref 98–107)
CO2 SERPL-SCNC: 26.7 MMOL/L (ref 22–29)
CREAT BLD-MCNC: 0.81 MG/DL (ref 0.57–1)
DEPRECATED RDW RBC AUTO: 39.4 FL (ref 37–54)
EOSINOPHIL # BLD AUTO: 0 10*3/MM3 (ref 0–0.7)
EOSINOPHIL NFR BLD AUTO: 0 % (ref 0.3–6.2)
ERYTHROCYTE [DISTWIDTH] IN BLOOD BY AUTOMATED COUNT: 13.1 % (ref 11.7–13)
GFR SERPL CREATININE-BSD FRML MDRD: 76 ML/MIN/1.73
GLUCOSE BLD-MCNC: 226 MG/DL (ref 65–99)
GLUCOSE BLDC GLUCOMTR-MCNC: 134 MG/DL (ref 70–130)
GLUCOSE BLDC GLUCOMTR-MCNC: 198 MG/DL (ref 70–130)
GLUCOSE BLDC GLUCOMTR-MCNC: 224 MG/DL (ref 70–130)
GLUCOSE BLDC GLUCOMTR-MCNC: 256 MG/DL (ref 70–130)
HCT VFR BLD AUTO: 36.7 % (ref 35.6–45.5)
HGB BLD-MCNC: 12.3 G/DL (ref 11.9–15.5)
IMM GRANULOCYTES # BLD: 0.02 10*3/MM3 (ref 0–0.03)
IMM GRANULOCYTES NFR BLD: 0.2 % (ref 0–0.5)
LYMPHOCYTES # BLD AUTO: 3.02 10*3/MM3 (ref 0.9–4.8)
LYMPHOCYTES NFR BLD AUTO: 29.8 % (ref 19.6–45.3)
MCH RBC QN AUTO: 27.8 PG (ref 26.9–32)
MCHC RBC AUTO-ENTMCNC: 33.5 G/DL (ref 32.4–36.3)
MCV RBC AUTO: 83 FL (ref 80.5–98.2)
MONOCYTES # BLD AUTO: 0.68 10*3/MM3 (ref 0.2–1.2)
MONOCYTES NFR BLD AUTO: 6.7 % (ref 5–12)
NEUTROPHILS # BLD AUTO: 6.42 10*3/MM3 (ref 1.9–8.1)
NEUTROPHILS NFR BLD AUTO: 63.4 % (ref 42.7–76)
PLATELET # BLD AUTO: 308 10*3/MM3 (ref 140–500)
PMV BLD AUTO: 10.5 FL (ref 6–12)
POTASSIUM BLD-SCNC: 4.5 MMOL/L (ref 3.5–5.2)
RBC # BLD AUTO: 4.42 10*6/MM3 (ref 3.9–5.2)
SODIUM BLD-SCNC: 138 MMOL/L (ref 136–145)
WBC NRBC COR # BLD: 10.13 10*3/MM3 (ref 4.5–10.7)

## 2018-07-10 PROCEDURE — 63710000001 INSULIN ASPART PER 5 UNITS: Performed by: OBSTETRICS & GYNECOLOGY

## 2018-07-10 PROCEDURE — 25010000002 ONDANSETRON PER 1 MG: Performed by: NURSE ANESTHETIST, CERTIFIED REGISTERED

## 2018-07-10 PROCEDURE — 88307 TISSUE EXAM BY PATHOLOGIST: CPT | Performed by: OBSTETRICS & GYNECOLOGY

## 2018-07-10 PROCEDURE — 25010000002 KETOROLAC TROMETHAMINE PER 15 MG: Performed by: NURSE ANESTHETIST, CERTIFIED REGISTERED

## 2018-07-10 PROCEDURE — 25010000002 PROPOFOL 10 MG/ML EMULSION: Performed by: NURSE ANESTHETIST, CERTIFIED REGISTERED

## 2018-07-10 PROCEDURE — G0378 HOSPITAL OBSERVATION PER HR: HCPCS

## 2018-07-10 PROCEDURE — 88331 PATH CONSLTJ SURG 1 BLK 1SPC: CPT | Performed by: OBSTETRICS & GYNECOLOGY

## 2018-07-10 PROCEDURE — 25010000003 CEFAZOLIN IN DEXTROSE 2-4 GM/100ML-% SOLUTION: Performed by: OBSTETRICS & GYNECOLOGY

## 2018-07-10 PROCEDURE — 85025 COMPLETE CBC W/AUTO DIFF WBC: CPT | Performed by: OBSTETRICS & GYNECOLOGY

## 2018-07-10 PROCEDURE — 82962 GLUCOSE BLOOD TEST: CPT

## 2018-07-10 PROCEDURE — 25010000002 HYDROMORPHONE PER 4 MG: Performed by: NURSE ANESTHETIST, CERTIFIED REGISTERED

## 2018-07-10 PROCEDURE — 88305 TISSUE EXAM BY PATHOLOGIST: CPT | Performed by: OBSTETRICS & GYNECOLOGY

## 2018-07-10 PROCEDURE — 25010000002 FENTANYL CITRATE (PF) 100 MCG/2ML SOLUTION: Performed by: NURSE ANESTHETIST, CERTIFIED REGISTERED

## 2018-07-10 PROCEDURE — 80048 BASIC METABOLIC PNL TOTAL CA: CPT | Performed by: OBSTETRICS & GYNECOLOGY

## 2018-07-10 PROCEDURE — 25010000002 MIDAZOLAM PER 1 MG: Performed by: ANESTHESIOLOGY

## 2018-07-10 PROCEDURE — C1765 ADHESION BARRIER: HCPCS | Performed by: OBSTETRICS & GYNECOLOGY

## 2018-07-10 PROCEDURE — 25010000002 ONDANSETRON PER 1 MG: Performed by: OBSTETRICS & GYNECOLOGY

## 2018-07-10 PROCEDURE — 25010000002 MORPHINE PER 10 MG: Performed by: OBSTETRICS & GYNECOLOGY

## 2018-07-10 RX ORDER — HYDROCODONE BITARTRATE AND ACETAMINOPHEN 7.5; 325 MG/1; MG/1
1 TABLET ORAL EVERY 4 HOURS PRN
Status: DISCONTINUED | OUTPATIENT
Start: 2018-07-10 | End: 2018-07-11 | Stop reason: HOSPADM

## 2018-07-10 RX ORDER — HYDROMORPHONE HYDROCHLORIDE 1 MG/ML
0.5 INJECTION, SOLUTION INTRAMUSCULAR; INTRAVENOUS; SUBCUTANEOUS
Status: DISCONTINUED | OUTPATIENT
Start: 2018-07-10 | End: 2018-07-10 | Stop reason: HOSPADM

## 2018-07-10 RX ORDER — HYDROCODONE BITARTRATE AND ACETAMINOPHEN 10; 325 MG/1; MG/1
1 TABLET ORAL EVERY 4 HOURS PRN
Status: DISCONTINUED | OUTPATIENT
Start: 2018-07-10 | End: 2018-07-10

## 2018-07-10 RX ORDER — ROCURONIUM BROMIDE 10 MG/ML
INJECTION, SOLUTION INTRAVENOUS AS NEEDED
Status: DISCONTINUED | OUTPATIENT
Start: 2018-07-10 | End: 2018-07-10 | Stop reason: SURG

## 2018-07-10 RX ORDER — CEFAZOLIN SODIUM 2 G/100ML
2 INJECTION, SOLUTION INTRAVENOUS
Status: DISCONTINUED | OUTPATIENT
Start: 2018-07-10 | End: 2018-07-10 | Stop reason: HOSPADM

## 2018-07-10 RX ORDER — DIPHENHYDRAMINE HCL 25 MG
25 CAPSULE ORAL NIGHTLY PRN
Status: DISCONTINUED | OUTPATIENT
Start: 2018-07-10 | End: 2018-07-11 | Stop reason: HOSPADM

## 2018-07-10 RX ORDER — SODIUM CHLORIDE 0.9 % (FLUSH) 0.9 %
1-10 SYRINGE (ML) INJECTION AS NEEDED
Status: DISCONTINUED | OUTPATIENT
Start: 2018-07-10 | End: 2018-07-10 | Stop reason: HOSPADM

## 2018-07-10 RX ORDER — PROMETHAZINE HYDROCHLORIDE 25 MG/1
25 TABLET ORAL EVERY 6 HOURS PRN
Status: DISCONTINUED | OUTPATIENT
Start: 2018-07-10 | End: 2018-07-11 | Stop reason: HOSPADM

## 2018-07-10 RX ORDER — SODIUM CHLORIDE, SODIUM LACTATE, POTASSIUM CHLORIDE, CALCIUM CHLORIDE 600; 310; 30; 20 MG/100ML; MG/100ML; MG/100ML; MG/100ML
9 INJECTION, SOLUTION INTRAVENOUS CONTINUOUS
Status: DISCONTINUED | OUTPATIENT
Start: 2018-07-10 | End: 2018-07-10

## 2018-07-10 RX ORDER — OXYCODONE AND ACETAMINOPHEN 7.5; 325 MG/1; MG/1
1 TABLET ORAL ONCE AS NEEDED
Status: DISCONTINUED | OUTPATIENT
Start: 2018-07-10 | End: 2018-07-10 | Stop reason: HOSPADM

## 2018-07-10 RX ORDER — SIMETHICONE 80 MG
80 TABLET,CHEWABLE ORAL 4 TIMES DAILY PRN
Status: DISCONTINUED | OUTPATIENT
Start: 2018-07-10 | End: 2018-07-11 | Stop reason: HOSPADM

## 2018-07-10 RX ORDER — PROPOFOL 10 MG/ML
VIAL (ML) INTRAVENOUS AS NEEDED
Status: DISCONTINUED | OUTPATIENT
Start: 2018-07-10 | End: 2018-07-10 | Stop reason: SURG

## 2018-07-10 RX ORDER — HYDROCODONE BITARTRATE AND ACETAMINOPHEN 7.5; 325 MG/1; MG/1
2 TABLET ORAL EVERY 4 HOURS PRN
Status: DISCONTINUED | OUTPATIENT
Start: 2018-07-10 | End: 2018-07-11 | Stop reason: HOSPADM

## 2018-07-10 RX ORDER — DIPHENHYDRAMINE HYDROCHLORIDE 50 MG/ML
12.5 INJECTION INTRAMUSCULAR; INTRAVENOUS
Status: DISCONTINUED | OUTPATIENT
Start: 2018-07-10 | End: 2018-07-10 | Stop reason: HOSPADM

## 2018-07-10 RX ORDER — FENTANYL CITRATE 50 UG/ML
50 INJECTION, SOLUTION INTRAMUSCULAR; INTRAVENOUS
Status: DISCONTINUED | OUTPATIENT
Start: 2018-07-10 | End: 2018-07-10 | Stop reason: HOSPADM

## 2018-07-10 RX ORDER — NALOXONE HCL 0.4 MG/ML
0.2 VIAL (ML) INJECTION AS NEEDED
Status: DISCONTINUED | OUTPATIENT
Start: 2018-07-10 | End: 2018-07-10 | Stop reason: HOSPADM

## 2018-07-10 RX ORDER — PSEUDOEPHEDRINE HCL 30 MG
100 TABLET ORAL 2 TIMES DAILY PRN
Qty: 30 CAPSULE | Refills: 1 | Status: CANCELLED | OUTPATIENT
Start: 2018-07-10

## 2018-07-10 RX ORDER — PHENAZOPYRIDINE HYDROCHLORIDE 200 MG/1
200 TABLET, FILM COATED ORAL ONCE
Status: COMPLETED | OUTPATIENT
Start: 2018-07-10 | End: 2018-07-10

## 2018-07-10 RX ORDER — MAGNESIUM HYDROXIDE 1200 MG/15ML
LIQUID ORAL AS NEEDED
Status: DISCONTINUED | OUTPATIENT
Start: 2018-07-10 | End: 2018-07-10 | Stop reason: HOSPADM

## 2018-07-10 RX ORDER — ONDANSETRON 2 MG/ML
INJECTION INTRAMUSCULAR; INTRAVENOUS AS NEEDED
Status: DISCONTINUED | OUTPATIENT
Start: 2018-07-10 | End: 2018-07-10 | Stop reason: SURG

## 2018-07-10 RX ORDER — FLUMAZENIL 0.1 MG/ML
0.2 INJECTION INTRAVENOUS AS NEEDED
Status: DISCONTINUED | OUTPATIENT
Start: 2018-07-10 | End: 2018-07-10 | Stop reason: HOSPADM

## 2018-07-10 RX ORDER — SODIUM CHLORIDE 9 MG/ML
INJECTION, SOLUTION INTRAVENOUS AS NEEDED
Status: DISCONTINUED | OUTPATIENT
Start: 2018-07-10 | End: 2018-07-10 | Stop reason: HOSPADM

## 2018-07-10 RX ORDER — ACETAMINOPHEN 650 MG/1
650 SUPPOSITORY RECTAL EVERY 4 HOURS PRN
Status: DISCONTINUED | OUTPATIENT
Start: 2018-07-10 | End: 2018-07-11 | Stop reason: HOSPADM

## 2018-07-10 RX ORDER — MEPERIDINE HYDROCHLORIDE 50 MG/ML
INJECTION INTRAMUSCULAR; INTRAVENOUS; SUBCUTANEOUS
Status: COMPLETED
Start: 2018-07-10 | End: 2018-07-10

## 2018-07-10 RX ORDER — MIDAZOLAM HYDROCHLORIDE 1 MG/ML
2 INJECTION INTRAMUSCULAR; INTRAVENOUS
Status: DISCONTINUED | OUTPATIENT
Start: 2018-07-10 | End: 2018-07-10 | Stop reason: HOSPADM

## 2018-07-10 RX ORDER — LABETALOL HYDROCHLORIDE 5 MG/ML
5 INJECTION, SOLUTION INTRAVENOUS
Status: DISCONTINUED | OUTPATIENT
Start: 2018-07-10 | End: 2018-07-10 | Stop reason: HOSPADM

## 2018-07-10 RX ORDER — DIPHENHYDRAMINE HYDROCHLORIDE 50 MG/ML
25 INJECTION INTRAMUSCULAR; INTRAVENOUS NIGHTLY PRN
Status: DISCONTINUED | OUTPATIENT
Start: 2018-07-10 | End: 2018-07-11 | Stop reason: HOSPADM

## 2018-07-10 RX ORDER — LIDOCAINE HYDROCHLORIDE 20 MG/ML
INJECTION, SOLUTION INFILTRATION; PERINEURAL AS NEEDED
Status: DISCONTINUED | OUTPATIENT
Start: 2018-07-10 | End: 2018-07-10 | Stop reason: SURG

## 2018-07-10 RX ORDER — LIDOCAINE HYDROCHLORIDE 10 MG/ML
0.5 INJECTION, SOLUTION EPIDURAL; INFILTRATION; INTRACAUDAL; PERINEURAL ONCE AS NEEDED
Status: DISCONTINUED | OUTPATIENT
Start: 2018-07-10 | End: 2018-07-10 | Stop reason: HOSPADM

## 2018-07-10 RX ORDER — PROMETHAZINE HYDROCHLORIDE 25 MG/1
12.5 TABLET ORAL ONCE AS NEEDED
Status: DISCONTINUED | OUTPATIENT
Start: 2018-07-10 | End: 2018-07-10 | Stop reason: HOSPADM

## 2018-07-10 RX ORDER — PROMETHAZINE HYDROCHLORIDE 25 MG/ML
12.5 INJECTION, SOLUTION INTRAMUSCULAR; INTRAVENOUS EVERY 6 HOURS PRN
Status: DISCONTINUED | OUTPATIENT
Start: 2018-07-10 | End: 2018-07-11 | Stop reason: HOSPADM

## 2018-07-10 RX ORDER — PROMETHAZINE HYDROCHLORIDE 25 MG/1
25 SUPPOSITORY RECTAL ONCE AS NEEDED
Status: DISCONTINUED | OUTPATIENT
Start: 2018-07-10 | End: 2018-07-10 | Stop reason: HOSPADM

## 2018-07-10 RX ORDER — ACETAMINOPHEN 325 MG/1
650 TABLET ORAL EVERY 4 HOURS PRN
Status: DISCONTINUED | OUTPATIENT
Start: 2018-07-10 | End: 2018-07-11 | Stop reason: HOSPADM

## 2018-07-10 RX ORDER — PROMETHAZINE HYDROCHLORIDE 25 MG/ML
12.5 INJECTION, SOLUTION INTRAMUSCULAR; INTRAVENOUS ONCE AS NEEDED
Status: DISCONTINUED | OUTPATIENT
Start: 2018-07-10 | End: 2018-07-10 | Stop reason: HOSPADM

## 2018-07-10 RX ORDER — KETOROLAC TROMETHAMINE 30 MG/ML
INJECTION, SOLUTION INTRAMUSCULAR; INTRAVENOUS AS NEEDED
Status: DISCONTINUED | OUTPATIENT
Start: 2018-07-10 | End: 2018-07-10 | Stop reason: SURG

## 2018-07-10 RX ORDER — ONDANSETRON 2 MG/ML
4 INJECTION INTRAMUSCULAR; INTRAVENOUS ONCE AS NEEDED
Status: DISCONTINUED | OUTPATIENT
Start: 2018-07-10 | End: 2018-07-10 | Stop reason: HOSPADM

## 2018-07-10 RX ORDER — MEPERIDINE HYDROCHLORIDE 25 MG/ML
25 INJECTION INTRAMUSCULAR; INTRAVENOUS; SUBCUTANEOUS EVERY 4 HOURS PRN
Status: DISCONTINUED | OUTPATIENT
Start: 2018-07-10 | End: 2018-07-10

## 2018-07-10 RX ORDER — FAMOTIDINE 10 MG/ML
20 INJECTION, SOLUTION INTRAVENOUS ONCE
Status: COMPLETED | OUTPATIENT
Start: 2018-07-10 | End: 2018-07-10

## 2018-07-10 RX ORDER — FENTANYL CITRATE 50 UG/ML
INJECTION, SOLUTION INTRAMUSCULAR; INTRAVENOUS AS NEEDED
Status: DISCONTINUED | OUTPATIENT
Start: 2018-07-10 | End: 2018-07-10 | Stop reason: SURG

## 2018-07-10 RX ORDER — DOCUSATE SODIUM 100 MG/1
100 CAPSULE, LIQUID FILLED ORAL 2 TIMES DAILY PRN
Status: DISCONTINUED | OUTPATIENT
Start: 2018-07-10 | End: 2018-07-11 | Stop reason: HOSPADM

## 2018-07-10 RX ORDER — MELATONIN 10 MG
10000 TABLET, SUBLINGUAL SUBLINGUAL EVERY EVENING
COMMUNITY
End: 2020-01-14

## 2018-07-10 RX ORDER — HYDROCODONE BITARTRATE AND ACETAMINOPHEN 7.5; 325 MG/1; MG/1
1 TABLET ORAL ONCE AS NEEDED
Status: DISCONTINUED | OUTPATIENT
Start: 2018-07-10 | End: 2018-07-10 | Stop reason: HOSPADM

## 2018-07-10 RX ORDER — MEPERIDINE HYDROCHLORIDE 50 MG/ML
25 INJECTION INTRAMUSCULAR; INTRAVENOUS; SUBCUTANEOUS EVERY 4 HOURS PRN
Status: DISCONTINUED | OUTPATIENT
Start: 2018-07-10 | End: 2018-07-10 | Stop reason: HOSPADM

## 2018-07-10 RX ORDER — ONDANSETRON 4 MG/1
4 TABLET, FILM COATED ORAL EVERY 6 HOURS PRN
Status: DISCONTINUED | OUTPATIENT
Start: 2018-07-10 | End: 2018-07-11 | Stop reason: HOSPADM

## 2018-07-10 RX ORDER — ONDANSETRON 2 MG/ML
4 INJECTION INTRAMUSCULAR; INTRAVENOUS EVERY 6 HOURS PRN
Status: DISCONTINUED | OUTPATIENT
Start: 2018-07-10 | End: 2018-07-11 | Stop reason: HOSPADM

## 2018-07-10 RX ORDER — HYDROCODONE BITARTRATE AND ACETAMINOPHEN 5; 325 MG/1; MG/1
1 TABLET ORAL EVERY 4 HOURS PRN
Status: DISCONTINUED | OUTPATIENT
Start: 2018-07-10 | End: 2018-07-10

## 2018-07-10 RX ORDER — HYDROCODONE BITARTRATE AND ACETAMINOPHEN 10; 325 MG/1; MG/1
TABLET ORAL
Status: COMPLETED
Start: 2018-07-10 | End: 2018-07-10

## 2018-07-10 RX ORDER — PROMETHAZINE HYDROCHLORIDE 25 MG/1
25 TABLET ORAL ONCE AS NEEDED
Status: DISCONTINUED | OUTPATIENT
Start: 2018-07-10 | End: 2018-07-10 | Stop reason: HOSPADM

## 2018-07-10 RX ORDER — SODIUM CHLORIDE 9 MG/ML
75 INJECTION, SOLUTION INTRAVENOUS CONTINUOUS
Status: DISCONTINUED | OUTPATIENT
Start: 2018-07-10 | End: 2018-07-11 | Stop reason: HOSPADM

## 2018-07-10 RX ORDER — IBUPROFEN 600 MG/1
600 TABLET ORAL EVERY 6 HOURS PRN
Status: DISCONTINUED | OUTPATIENT
Start: 2018-07-10 | End: 2018-07-11 | Stop reason: HOSPADM

## 2018-07-10 RX ORDER — NALOXONE HCL 0.4 MG/ML
0.4 VIAL (ML) INJECTION
Status: DISCONTINUED | OUTPATIENT
Start: 2018-07-10 | End: 2018-07-11 | Stop reason: HOSPADM

## 2018-07-10 RX ORDER — ACETAMINOPHEN 160 MG/5ML
650 SOLUTION ORAL EVERY 4 HOURS PRN
Status: DISCONTINUED | OUTPATIENT
Start: 2018-07-10 | End: 2018-07-11 | Stop reason: HOSPADM

## 2018-07-10 RX ORDER — MORPHINE SULFATE 2 MG/ML
2 INJECTION, SOLUTION INTRAMUSCULAR; INTRAVENOUS
Status: DISCONTINUED | OUTPATIENT
Start: 2018-07-10 | End: 2018-07-11 | Stop reason: HOSPADM

## 2018-07-10 RX ORDER — ONDANSETRON 4 MG/1
4 TABLET, ORALLY DISINTEGRATING ORAL EVERY 6 HOURS PRN
Status: DISCONTINUED | OUTPATIENT
Start: 2018-07-10 | End: 2018-07-11 | Stop reason: HOSPADM

## 2018-07-10 RX ORDER — MIDAZOLAM HYDROCHLORIDE 1 MG/ML
1 INJECTION INTRAMUSCULAR; INTRAVENOUS
Status: DISCONTINUED | OUTPATIENT
Start: 2018-07-10 | End: 2018-07-10 | Stop reason: HOSPADM

## 2018-07-10 RX ORDER — EPHEDRINE SULFATE 50 MG/ML
5 INJECTION, SOLUTION INTRAVENOUS ONCE AS NEEDED
Status: DISCONTINUED | OUTPATIENT
Start: 2018-07-10 | End: 2018-07-10 | Stop reason: HOSPADM

## 2018-07-10 RX ORDER — BUPIVACAINE HYDROCHLORIDE AND EPINEPHRINE 5; 5 MG/ML; UG/ML
INJECTION, SOLUTION PERINEURAL AS NEEDED
Status: DISCONTINUED | OUTPATIENT
Start: 2018-07-10 | End: 2018-07-10 | Stop reason: HOSPADM

## 2018-07-10 RX ADMIN — HYDROCODONE BITARTRATE AND ACETAMINOPHEN 1 TABLET: 10; 325 TABLET ORAL at 10:37

## 2018-07-10 RX ADMIN — MEPERIDINE HYDROCHLORIDE 25 MG: 50 INJECTION INTRAMUSCULAR; INTRAVENOUS; SUBCUTANEOUS at 09:48

## 2018-07-10 RX ADMIN — PHENAZOPYRIDINE HYDROCHLORIDE 200 MG: 200 TABLET, FILM COATED ORAL at 06:26

## 2018-07-10 RX ADMIN — FENTANYL CITRATE 50 MCG: 50 INJECTION, SOLUTION INTRAMUSCULAR; INTRAVENOUS at 07:56

## 2018-07-10 RX ADMIN — PROPOFOL 200 MG: 10 INJECTION, EMULSION INTRAVENOUS at 07:31

## 2018-07-10 RX ADMIN — FAMOTIDINE 20 MG: 10 INJECTION, SOLUTION INTRAVENOUS at 06:50

## 2018-07-10 RX ADMIN — SODIUM CHLORIDE 75 ML/HR: 9 INJECTION, SOLUTION INTRAVENOUS at 12:24

## 2018-07-10 RX ADMIN — FENTANYL CITRATE 50 MCG: 50 INJECTION, SOLUTION INTRAMUSCULAR; INTRAVENOUS at 09:40

## 2018-07-10 RX ADMIN — SODIUM CHLORIDE, POTASSIUM CHLORIDE, SODIUM LACTATE AND CALCIUM CHLORIDE 9 ML/HR: 600; 310; 30; 20 INJECTION, SOLUTION INTRAVENOUS at 06:50

## 2018-07-10 RX ADMIN — FENTANYL CITRATE 50 MCG: 50 INJECTION, SOLUTION INTRAMUSCULAR; INTRAVENOUS at 10:31

## 2018-07-10 RX ADMIN — ONDANSETRON 4 MG: 2 INJECTION INTRAMUSCULAR; INTRAVENOUS at 09:05

## 2018-07-10 RX ADMIN — INSULIN ASPART 100 UNITS: 100 INJECTION, SOLUTION INTRAVENOUS; SUBCUTANEOUS at 12:22

## 2018-07-10 RX ADMIN — FENTANYL CITRATE 50 MCG: 50 INJECTION, SOLUTION INTRAMUSCULAR; INTRAVENOUS at 09:27

## 2018-07-10 RX ADMIN — SODIUM CHLORIDE, POTASSIUM CHLORIDE, SODIUM LACTATE AND CALCIUM CHLORIDE: 600; 310; 30; 20 INJECTION, SOLUTION INTRAVENOUS at 09:08

## 2018-07-10 RX ADMIN — HYDROCODONE BITARTRATE AND ACETAMINOPHEN 2 TABLET: 7.5; 325 TABLET ORAL at 15:46

## 2018-07-10 RX ADMIN — ONDANSETRON 4 MG: 2 INJECTION INTRAMUSCULAR; INTRAVENOUS at 12:24

## 2018-07-10 RX ADMIN — MIDAZOLAM HYDROCHLORIDE 2 MG: 2 INJECTION, SOLUTION INTRAMUSCULAR; INTRAVENOUS at 07:14

## 2018-07-10 RX ADMIN — ROCURONIUM BROMIDE 10 MG: 10 INJECTION INTRAVENOUS at 08:48

## 2018-07-10 RX ADMIN — SUGAMMADEX 200 MG: 100 INJECTION, SOLUTION INTRAVENOUS at 09:11

## 2018-07-10 RX ADMIN — CEFAZOLIN SODIUM 2 G: 2 INJECTION, SOLUTION INTRAVENOUS at 07:31

## 2018-07-10 RX ADMIN — FENTANYL CITRATE 50 MCG: 50 INJECTION, SOLUTION INTRAMUSCULAR; INTRAVENOUS at 09:16

## 2018-07-10 RX ADMIN — HYDROMORPHONE HYDROCHLORIDE 0.5 MG: 1 INJECTION, SOLUTION INTRAMUSCULAR; INTRAVENOUS; SUBCUTANEOUS at 09:58

## 2018-07-10 RX ADMIN — LIDOCAINE HYDROCHLORIDE 100 MG: 20 INJECTION, SOLUTION INFILTRATION; PERINEURAL at 07:31

## 2018-07-10 RX ADMIN — HYDROMORPHONE HYDROCHLORIDE 0.5 MG: 1 INJECTION, SOLUTION INTRAMUSCULAR; INTRAVENOUS; SUBCUTANEOUS at 10:38

## 2018-07-10 RX ADMIN — IBUPROFEN 600 MG: 600 TABLET ORAL at 13:50

## 2018-07-10 RX ADMIN — DOCUSATE SODIUM 100 MG: 100 CAPSULE, LIQUID FILLED ORAL at 15:46

## 2018-07-10 RX ADMIN — FENTANYL CITRATE 50 MCG: 50 INJECTION, SOLUTION INTRAMUSCULAR; INTRAVENOUS at 08:48

## 2018-07-10 RX ADMIN — ROCURONIUM BROMIDE 20 MG: 10 INJECTION INTRAVENOUS at 07:56

## 2018-07-10 RX ADMIN — ROCURONIUM BROMIDE 50 MG: 10 INJECTION INTRAVENOUS at 07:31

## 2018-07-10 RX ADMIN — HYDROCODONE BITARTRATE AND ACETAMINOPHEN 2 TABLET: 7.5; 325 TABLET ORAL at 20:03

## 2018-07-10 RX ADMIN — FENTANYL CITRATE 50 MCG: 50 INJECTION, SOLUTION INTRAMUSCULAR; INTRAVENOUS at 07:31

## 2018-07-10 RX ADMIN — KETOROLAC TROMETHAMINE 30 MG: 30 INJECTION, SOLUTION INTRAMUSCULAR; INTRAVENOUS at 09:10

## 2018-07-10 RX ADMIN — MORPHINE SULFATE 2 MG: 2 INJECTION, SOLUTION INTRAMUSCULAR; INTRAVENOUS at 12:24

## 2018-07-10 RX ADMIN — HYDROMORPHONE HYDROCHLORIDE 0.5 MG: 1 INJECTION, SOLUTION INTRAMUSCULAR; INTRAVENOUS; SUBCUTANEOUS at 09:48

## 2018-07-10 RX ADMIN — FENTANYL CITRATE 50 MCG: 50 INJECTION, SOLUTION INTRAMUSCULAR; INTRAVENOUS at 09:52

## 2018-07-10 NOTE — PLAN OF CARE
Problem: Patient Care Overview  Goal: Plan of Care Review  Outcome: Ongoing (interventions implemented as appropriate)   07/10/18 1369   Coping/Psychosocial   Plan of Care Reviewed With patient;spouse   Plan of Care Review   Progress improving   OTHER   Outcome Summary lap x 3 with steri-strips. vss. quique reg diet. voiding without diff. IVF infusing. pain disproportionate to surgery but adeq relief with Norco and Motrin. enc IS use and amb,      Goal: Individualization and Mutuality  Outcome: Ongoing (interventions implemented as appropriate)    Goal: Discharge Needs Assessment  Outcome: Ongoing (interventions implemented as appropriate)    Goal: Interprofessional Rounds/Family Conf  Outcome: Ongoing (interventions implemented as appropriate)      Problem: Hysterectomy (Adult)  Goal: Signs and Symptoms of Listed Potential Problems Will be Absent, Minimized or Managed (Hysterectomy)  Outcome: Ongoing (interventions implemented as appropriate)    Goal: Anesthesia/Sedation Recovery  Outcome: Outcome(s) achieved Date Met: 07/10/18

## 2018-07-10 NOTE — ANESTHESIA PREPROCEDURE EVALUATION
Anesthesia Evaluation     Patient summary reviewed and Nursing notes reviewed   NPO Solid Status: > 8 hours  NPO Liquid Status: > 8 hours           Airway   Mallampati: II  TM distance: >3 FB  Neck ROM: full  no difficulty expected  Dental - normal exam     Pulmonary - normal exam   Cardiovascular - normal exam    (+) hyperlipidemia,       Neuro/Psych  (+) psychiatric history Anxiety and Bipolar,     GI/Hepatic/Renal/Endo    (+)   diabetes mellitus type 1 using insulin,     Musculoskeletal     Abdominal  - normal exam   Substance History      OB/GYN          Other                        Anesthesia Plan    ASA 3     general     intravenous induction   Anesthetic plan and risks discussed with patient.

## 2018-07-10 NOTE — ANESTHESIA PROCEDURE NOTES
Airway  Urgency: elective    Date/Time: 7/10/2018 7:34 AM    General Information and Staff    Patient location during procedure: OR  CRNA: KARLEE PALOMINO    Indications and Patient Condition  Indications for airway management: airway protection    Preoxygenated: yes  MILS maintained throughout  Mask difficulty assessment: 2 - vent by mask + OA or adjuvant +/- NMBA    Final Airway Details  Final airway type: endotracheal airway      Successful airway: ETT  Cuffed: yes   Successful intubation technique: direct laryngoscopy  Facilitating devices/methods: intubating stylet  Endotracheal tube insertion site: oral  Blade: Burton  Blade size: #3  ETT size: 7.0 mm  Cormack-Lehane Classification: grade I - full view of glottis  Placement verified by: chest auscultation and capnometry   Cuff volume (mL): 7  Measured from: lips  ETT to lips (cm): 21  Number of attempts at approach: 1    Additional Comments  Patient in OR. Monitors on. BLVS. Pre 02 100%. SIVI. DL x1. DVVC. Atraumatic placement of ETT. Placement verified with ETC02 and BBS. ETT secured. Teeth/lips in pre-op condition.

## 2018-07-10 NOTE — ANESTHESIA POSTPROCEDURE EVALUATION
"Patient: Karly Marianoy    Procedure Summary     Date:  07/10/18 Room / Location:  [facility] OR [address] [place] [address] MAIN OR    Anesthesia Start:  0727 Anesthesia Stop:  0938    Procedures:       LAPAROSCOPIC SUPRACERVICAL HYSTERECTOMY WITH MORCELLATOR, LEFT SALPINGECTOMY (N/A Abdomen)      FROZEN DILATATION AND CURETTAGE POWER MORECELLATOR (N/A Vagina) Diagnosis:      Surgeon:  Nicole Shannon MD Provider:  Jluis Orantes MD    Anesthesia Type:  general ASA Status:  3          Anesthesia Type: general  Last vitals  BP   108/64 (07/10/18 1000)   Temp   37.3 °C (99.1 °F) (07/10/18 0931)   Pulse   102 (07/10/18 1000)   Resp   16 (07/10/18 1000)     SpO2   97 % (07/10/18 1000)     Post Anesthesia Care and Evaluation    Patient location during evaluation: bedside  Patient participation: complete - patient participated  Level of consciousness: awake and alert  Pain management: adequate  Airway patency: patent  Anesthetic complications: No anesthetic complications    Cardiovascular status: acceptable  Respiratory status: acceptable  Hydration status: acceptable    Comments: /64   Pulse 102   Temp 37.3 °C (99.1 °F) (Oral)   Resp 16   Ht 175.3 cm (69\")   Wt 87.1 kg (192 lb)   LMP 06/06/2018   SpO2 97%   BMI 28.35 kg/m²       "

## 2018-07-11 VITALS
SYSTOLIC BLOOD PRESSURE: 91 MMHG | WEIGHT: 192 LBS | DIASTOLIC BLOOD PRESSURE: 57 MMHG | OXYGEN SATURATION: 95 % | BODY MASS INDEX: 28.44 KG/M2 | RESPIRATION RATE: 16 BRPM | TEMPERATURE: 97.1 F | HEART RATE: 75 BPM | HEIGHT: 69 IN

## 2018-07-11 LAB
CYTO UR: NORMAL
GLUCOSE BLDC GLUCOMTR-MCNC: 168 MG/DL (ref 70–130)
LAB AP CASE REPORT: NORMAL
Lab: NORMAL
PATH REPORT.FINAL DX SPEC: NORMAL
PATH REPORT.GROSS SPEC: NORMAL

## 2018-07-11 PROCEDURE — G0378 HOSPITAL OBSERVATION PER HR: HCPCS

## 2018-07-11 PROCEDURE — 82962 GLUCOSE BLOOD TEST: CPT

## 2018-07-11 RX ORDER — SIMETHICONE 80 MG
80 TABLET,CHEWABLE ORAL 4 TIMES DAILY PRN
Qty: 30 TABLET | Refills: 0 | Status: SHIPPED | OUTPATIENT
Start: 2018-07-11 | End: 2020-01-14

## 2018-07-11 RX ORDER — PSEUDOEPHEDRINE HCL 30 MG
100 TABLET ORAL 2 TIMES DAILY PRN
Qty: 30 CAPSULE | Refills: 0 | Status: SHIPPED | OUTPATIENT
Start: 2018-07-11 | End: 2020-07-13

## 2018-07-11 RX ORDER — HYDROCODONE BITARTRATE AND ACETAMINOPHEN 7.5; 325 MG/1; MG/1
1 TABLET ORAL EVERY 4 HOURS PRN
Qty: 30 TABLET | Refills: 0 | Status: SHIPPED | OUTPATIENT
Start: 2018-07-11 | End: 2018-07-21

## 2018-07-11 RX ORDER — IBUPROFEN 600 MG/1
600 TABLET ORAL EVERY 6 HOURS PRN
Qty: 60 TABLET | Refills: 0 | Status: SHIPPED | OUTPATIENT
Start: 2018-07-11 | End: 2020-07-13

## 2018-07-11 RX ADMIN — HYDROCODONE BITARTRATE AND ACETAMINOPHEN 2 TABLET: 7.5; 325 TABLET ORAL at 04:15

## 2018-07-11 RX ADMIN — IBUPROFEN 600 MG: 600 TABLET ORAL at 04:18

## 2018-07-11 RX ADMIN — HYDROCODONE BITARTRATE AND ACETAMINOPHEN 2 TABLET: 7.5; 325 TABLET ORAL at 00:03

## 2018-07-11 RX ADMIN — DOCUSATE SODIUM 100 MG: 100 CAPSULE, LIQUID FILLED ORAL at 08:13

## 2018-07-11 RX ADMIN — SODIUM CHLORIDE 75 ML/HR: 9 INJECTION, SOLUTION INTRAVENOUS at 00:10

## 2018-07-11 RX ADMIN — HYDROCODONE BITARTRATE AND ACETAMINOPHEN 2 TABLET: 7.5; 325 TABLET ORAL at 08:13

## 2018-07-11 NOTE — PLAN OF CARE
Problem: Patient Care Overview  Goal: Plan of Care Review  Outcome: Ongoing (interventions implemented as appropriate)   07/11/18 0806   Coping/Psychosocial   Plan of Care Reviewed With patient   Plan of Care Review   Progress improving   OTHER   Outcome Summary Lap sites x3 dry and intact. Medicated with Norco 2 tabs as needed and ice bag to abdomen which appears to be controlling pain well. Accuchecks done. See lab results and pt adjusting insulin as needed. No vaginal bleeding VS stable B/P run 90's/60's. No s/s of bleeding. Ambulated with assistance and voiding well     Goal: Individualization and Mutuality  Outcome: Ongoing (interventions implemented as appropriate)    Goal: Discharge Needs Assessment  Outcome: Ongoing (interventions implemented as appropriate)    Goal: Interprofessional Rounds/Family Conf  Outcome: Ongoing (interventions implemented as appropriate)      Problem: Hysterectomy (Adult)  Goal: Signs and Symptoms of Listed Potential Problems Will be Absent, Minimized or Managed (Hysterectomy)  Outcome: Ongoing (interventions implemented as appropriate)

## 2018-07-11 NOTE — PROGRESS NOTES
"GYN Postop Progress Note   Karly Zayas is doing well this AM. Pain well controlled on PO meds. Tolerating regular diet without N/V. Denies CP, SOB, fevers and chills. Has ambulated and passed flatus. Voiding without difficulty.      O:  Vitals range:  Temp:  [97.1 °F (36.2 °C)-99.1 °F (37.3 °C)] 97.3 °F (36.3 °C)  Heart Rate:  [] 78  Resp:  [16-24] 16  BP: ()/(52-93) 93/63     Current Vitals:  BP 93/63 (BP Location: Left arm, Patient Position: Lying)   Pulse 78   Temp 97.3 °F (36.3 °C) (Oral)   Resp 16   Ht 175.3 cm (69\")   Wt 87.1 kg (192 lb)   LMP 06/06/2018   SpO2 98%   BMI 28.35 kg/m²      Scheduled Meds:   Continuous Infusions:  insulin aspart 100 Units    sodium chloride 75 mL/hr Last Rate: 75 mL/hr (07/11/18 0010)     PRN Meds:.•  acetaminophen **OR** acetaminophen **OR** acetaminophen  •  diphenhydrAMINE **OR** diphenhydrAMINE  •  docusate sodium  •  HYDROcodone-acetaminophen **OR** HYDROcodone-acetaminophen  •  ibuprofen  •  Morphine **AND** naloxone  •  ondansetron **OR** ondansetron ODT **OR** ondansetron  •  promethazine  •  promethazine  •  simethicone    Exam:  Gen: Well appearing. NAD  Heart: RRR, no m/r/g  Lungs: CTAB  Abd: Soft, non-tender, non-distended, active bowel sounds. Appropriately tender to palpation.   Incisions are c/d/i  Pelvic: No vaginal bleeding.   Extremities: WWP with SCDs in place     Labs:  CBC and BMP reviewed and WNL     A/P:  Recovering well postoperatively  Meeting all postoperative milestones, however awaiting voiding trial.   Plan for discharge home to self care today.      Salinas Yanez MD  7/11/2018  5:48 AM           "

## 2018-07-11 NOTE — PROGRESS NOTES
"GYN Postop Progress Note      S: Doing well this AM. Pain well controlled on PO meds. Tolerating regular diet without N/V. Denies CP, SOB, fevers and chills. Has ambulated and passed flatus. Voiding without difficulty.      O:  Vitals range:  Temp:  [97.1 °F (36.2 °C)-99.1 °F (37.3 °C)] 97.3 °F (36.3 °C)  Heart Rate:  [] 78  Resp:  [16-24] 16  BP: ()/(52-93) 93/63     Current Vitals:  BP 93/63 (BP Location: Left arm, Patient Position: Lying)   Pulse 78   Temp 97.3 °F (36.3 °C) (Oral)   Resp 16   Ht 175.3 cm (69\")   Wt 87.1 kg (192 lb)   LMP 06/06/2018   SpO2 98%   BMI 28.35 kg/m²      Scheduled Meds:   Continuous Infusions:  insulin aspart 100 Units    sodium chloride 75 mL/hr Last Rate: 75 mL/hr (07/11/18 0010)     PRN Meds:.•  acetaminophen **OR** acetaminophen **OR** acetaminophen  •  diphenhydrAMINE **OR** diphenhydrAMINE  •  docusate sodium  •  HYDROcodone-acetaminophen **OR** HYDROcodone-acetaminophen  •  ibuprofen  •  Morphine **AND** naloxone  •  ondansetron **OR** ondansetron ODT **OR** ondansetron  •  promethazine  •  promethazine  •  simethicone    Exam:  Gen: Well appearing. NAD  Heart: RRR, no m/r/g  Lungs: CTAB  Abd: Soft, non-tender, non-distended, active bowel sounds. Appropriately tender to palpation.   Incisions are c/d/i  Pelvic: No vaginal bleeding.   Extremities: WWP with SCDs in place     Labs:  CBC and BMP reviewed     A/P:  BGs moderately well controlled over night.   Recovering well postoperatively  Meeting all postoperative milestones  Plan for discharge home to self care today.      Salinas Yanez MD  7/11/2018  5:52 AM           "

## 2018-11-05 ENCOUNTER — HOSPITAL ENCOUNTER (OUTPATIENT)
Dept: LAB | Facility: HOSPITAL | Age: 48
Setting detail: SPECIMEN
Discharge: HOME | End: 2018-11-05
Attending: INTERNAL MEDICINE | Admitting: INTERNAL MEDICINE

## 2018-11-05 LAB
ALBUMIN SERPL-MCNC: 3.7 G/DL (ref 3.5–4.8)
ALBUMIN/GLOB SERPL: 1.1 {RATIO} (ref 1–1.7)
ALP SERPL-CCNC: 94 IU/L (ref 32–91)
ALT SERPL-CCNC: 12 IU/L (ref 14–54)
ANION GAP SERPL CALC-SCNC: 14.6 MMOL/L (ref 10–20)
AST SERPL-CCNC: 14 IU/L (ref 15–41)
BILIRUB SERPL-MCNC: 0.6 MG/DL (ref 0.3–1.2)
BUN SERPL-MCNC: 10 MG/DL (ref 8–20)
BUN/CREAT SERPL: 14.3 (ref 5.4–26.2)
CALCIUM SERPL-MCNC: 9.1 MG/DL (ref 8.9–10.3)
CHLORIDE SERPL-SCNC: 100 MMOL/L (ref 101–111)
CHOLEST SERPL-MCNC: 254 MG/DL
CHOLEST/HDLC SERPL: 4 {RATIO}
CONV CO2: 26 MMOL/L (ref 22–32)
CONV LDL CHOLESTEROL DIRECT: 160 MG/DL (ref 0–100)
CONV MICROALBUM.,U,RANDOM: 56 MG/L
CONV TOTAL PROTEIN: 7 G/DL (ref 6.1–7.9)
CREAT 24H UR-MCNC: 278.1 MG/DL
CREAT UR-MCNC: 0.7 MG/DL (ref 0.4–1)
GLOBULIN UR ELPH-MCNC: 3.3 G/DL (ref 2.5–3.8)
GLUCOSE SERPL-MCNC: 167 MG/DL (ref 65–99)
HDLC SERPL-MCNC: 64 MG/DL
LDLC/HDLC SERPL: 2.5 {RATIO}
LIPID INTERPRETATION: ABNORMAL
MICROALBUMIN/CREAT UR: 20.1 UG/MG
POTASSIUM SERPL-SCNC: 3.6 MMOL/L (ref 3.6–5.1)
SODIUM SERPL-SCNC: 137 MMOL/L (ref 136–144)
TRIGL SERPL-MCNC: 191 MG/DL
VLDLC SERPL CALC-MCNC: 30.3 MG/DL

## 2018-11-12 ENCOUNTER — CONVERSION ENCOUNTER (OUTPATIENT)
Dept: ENDOCRINOLOGY | Facility: CLINIC | Age: 48
End: 2018-11-12

## 2019-05-01 ENCOUNTER — HOSPITAL ENCOUNTER (OUTPATIENT)
Dept: LAB | Facility: HOSPITAL | Age: 49
Setting detail: SPECIMEN
Discharge: HOME | End: 2019-05-01
Attending: INTERNAL MEDICINE | Admitting: INTERNAL MEDICINE

## 2019-06-04 VITALS
HEART RATE: 91 BPM | BODY MASS INDEX: 26.66 KG/M2 | DIASTOLIC BLOOD PRESSURE: 72 MMHG | DIASTOLIC BLOOD PRESSURE: 70 MMHG | OXYGEN SATURATION: 93 % | WEIGHT: 217 LBS | HEART RATE: 73 BPM | WEIGHT: 180 LBS | WEIGHT: 209 LBS | SYSTOLIC BLOOD PRESSURE: 104 MMHG | SYSTOLIC BLOOD PRESSURE: 108 MMHG | OXYGEN SATURATION: 97 % | HEART RATE: 70 BPM | OXYGEN SATURATION: 98 % | SYSTOLIC BLOOD PRESSURE: 118 MMHG | DIASTOLIC BLOOD PRESSURE: 78 MMHG | HEIGHT: 69 IN

## 2019-07-24 ENCOUNTER — TELEPHONE (OUTPATIENT)
Dept: ENDOCRINOLOGY | Facility: CLINIC | Age: 49
End: 2019-07-24

## 2019-07-24 NOTE — TELEPHONE ENCOUNTER
I don't see anything about it in my notes. Does she have burning on urination or vaginal discharge? I can give her fluconazole 150 mg one @ bedtime for vaginal yeast. But she will need U/A with c& s if urinary burning.  How are her sugars? Her A1C was 13% the last time. She was an appt with Akiko coming up on August 6th, but won't be happening now...Ok to switch to Admelog.

## 2019-07-24 NOTE — TELEPHONE ENCOUNTER
Pt called about UTi symptoms- she states she was told to let you know if she had this issue. Please advise. Also Humalog is not covered by insurance but will cover Admelog. Is it ok to switch?  Pt uses [facility] in [place]

## 2019-07-25 NOTE — TELEPHONE ENCOUNTER
Patient states she knows it is a UTI- she has had them before. She also stated that she can not afford a Urinalysis due to being in Bankruptcy. She just wants an antibiotic sent in that is not Macrobid. Please advise. Admelog was sent into the pharmacy for her.

## 2019-07-26 DIAGNOSIS — E10.40 TYPE 1 DIABETES MELLITUS WITH DIABETIC NEUROPATHY (HCC): ICD-10-CM

## 2019-07-26 DIAGNOSIS — E55.9 VITAMIN D DEFICIENCY: Primary | ICD-10-CM

## 2019-07-26 DIAGNOSIS — E78.2 HYPERLIPIDEMIA, MIXED: ICD-10-CM

## 2019-07-26 DIAGNOSIS — E03.9 HYPOTHYROIDISM, UNSPECIFIED TYPE: ICD-10-CM

## 2019-07-26 PROBLEM — T14.8XXA ABRASION: Status: ACTIVE | Noted: 2017-05-16

## 2019-07-26 PROBLEM — M51.369 DDD (DEGENERATIVE DISC DISEASE), LUMBAR: Status: ACTIVE | Noted: 2019-07-26

## 2019-07-26 PROBLEM — Z23 ENCOUNTER FOR IMMUNIZATION: Status: ACTIVE | Noted: 2017-10-17

## 2019-07-26 PROBLEM — G57.90 NEURITIS OF FOOT: Status: ACTIVE | Noted: 2017-06-08

## 2019-07-26 PROBLEM — Z92.241 STATUS POST EPIDURAL STEROID INJECTION: Status: ACTIVE | Noted: 2019-07-26

## 2019-07-26 PROBLEM — M51.36 DDD (DEGENERATIVE DISC DISEASE), LUMBAR: Status: ACTIVE | Noted: 2019-07-26

## 2019-07-26 PROBLEM — S92.309A: Status: ACTIVE | Noted: 2017-07-20

## 2019-07-26 PROBLEM — S90.31XA CONTUSION OF RIGHT FOOT: Status: ACTIVE | Noted: 2017-05-16

## 2019-07-29 ENCOUNTER — TELEPHONE (OUTPATIENT)
Dept: ENDOCRINOLOGY | Facility: CLINIC | Age: 49
End: 2019-07-29

## 2020-01-07 ENCOUNTER — TELEPHONE (OUTPATIENT)
Dept: ENDOCRINOLOGY | Facility: CLINIC | Age: 50
End: 2020-01-07

## 2020-01-07 ENCOUNTER — LAB (OUTPATIENT)
Dept: LAB | Facility: HOSPITAL | Age: 50
End: 2020-01-07

## 2020-01-07 DIAGNOSIS — E10.40 TYPE 1 DIABETES MELLITUS WITH DIABETIC NEUROPATHY (HCC): ICD-10-CM

## 2020-01-07 DIAGNOSIS — E03.9 HYPOTHYROIDISM, UNSPECIFIED TYPE: ICD-10-CM

## 2020-01-07 DIAGNOSIS — E78.2 HYPERLIPIDEMIA, MIXED: ICD-10-CM

## 2020-01-07 DIAGNOSIS — E55.9 VITAMIN D DEFICIENCY: ICD-10-CM

## 2020-01-07 LAB
25(OH)D3 SERPL-MCNC: 25.4 NG/ML (ref 30–100)
ALBUMIN SERPL-MCNC: 4.2 G/DL (ref 3.5–5.2)
ALBUMIN UR-MCNC: 4.7 MG/DL
ALBUMIN/GLOB SERPL: 1.3 G/DL
ALP SERPL-CCNC: 102 U/L (ref 39–117)
ALT SERPL W P-5'-P-CCNC: 25 U/L (ref 1–33)
ANION GAP SERPL CALCULATED.3IONS-SCNC: 10.7 MMOL/L (ref 5–15)
AST SERPL-CCNC: 23 U/L (ref 1–32)
BILIRUB SERPL-MCNC: 0.4 MG/DL (ref 0.2–1.2)
BUN BLD-MCNC: 10 MG/DL (ref 6–20)
BUN/CREAT SERPL: 11.9 (ref 7–25)
CALCIUM SPEC-SCNC: 9.5 MG/DL (ref 8.6–10.5)
CHLORIDE SERPL-SCNC: 95 MMOL/L (ref 98–107)
CHOLEST SERPL-MCNC: 161 MG/DL (ref 0–200)
CO2 SERPL-SCNC: 26.3 MMOL/L (ref 22–29)
CREAT BLD-MCNC: 0.84 MG/DL (ref 0.57–1)
CREAT UR-MCNC: 89.2 MG/DL
GFR SERPL CREATININE-BSD FRML MDRD: 72 ML/MIN/1.73
GLOBULIN UR ELPH-MCNC: 3.2 GM/DL
GLUCOSE BLD-MCNC: 450 MG/DL (ref 65–99)
HBA1C MFR BLD: 13 % (ref 3.5–5.6)
HDLC SERPL-MCNC: 54 MG/DL (ref 40–60)
LDLC SERPL CALC-MCNC: 72 MG/DL (ref 0–100)
LDLC/HDLC SERPL: 1.34 {RATIO}
MICROALBUMIN/CREAT UR: 52.7 MG/G
POTASSIUM BLD-SCNC: 4.5 MMOL/L (ref 3.5–5.2)
PROT SERPL-MCNC: 7.4 G/DL (ref 6–8.5)
SODIUM BLD-SCNC: 132 MMOL/L (ref 136–145)
T4 FREE SERPL-MCNC: 1.37 NG/DL (ref 0.93–1.7)
TRIGL SERPL-MCNC: 173 MG/DL (ref 0–150)
TSH SERPL DL<=0.05 MIU/L-ACNC: 2.62 UIU/ML (ref 0.27–4.2)
VLDLC SERPL-MCNC: 34.6 MG/DL (ref 5–40)

## 2020-01-07 PROCEDURE — 80053 COMPREHEN METABOLIC PANEL: CPT

## 2020-01-07 PROCEDURE — 83036 HEMOGLOBIN GLYCOSYLATED A1C: CPT

## 2020-01-07 PROCEDURE — 80061 LIPID PANEL: CPT

## 2020-01-07 PROCEDURE — 36415 COLL VENOUS BLD VENIPUNCTURE: CPT

## 2020-01-07 PROCEDURE — 84439 ASSAY OF FREE THYROXINE: CPT

## 2020-01-07 PROCEDURE — 82570 ASSAY OF URINE CREATININE: CPT

## 2020-01-07 PROCEDURE — 82043 UR ALBUMIN QUANTITATIVE: CPT

## 2020-01-07 PROCEDURE — 82306 VITAMIN D 25 HYDROXY: CPT

## 2020-01-07 PROCEDURE — 84443 ASSAY THYROID STIM HORMONE: CPT

## 2020-01-07 RX ORDER — PROMETHAZINE HYDROCHLORIDE 25 MG/1
SUPPOSITORY RECTAL
COMMUNITY
Start: 2015-12-28 | End: 2020-01-14

## 2020-01-07 RX ORDER — FLASH GLUCOSE SENSOR
KIT MISCELLANEOUS
COMMUNITY
Start: 2019-05-08 | End: 2020-07-13

## 2020-01-07 RX ORDER — ERGOCALCIFEROL 1.25 MG/1
1 CAPSULE ORAL
COMMUNITY
Start: 2019-05-08 | End: 2020-01-14

## 2020-01-07 RX ORDER — LEVOTHYROXINE SODIUM 0.03 MG/1
TABLET ORAL DAILY
COMMUNITY
Start: 2017-04-19 | End: 2020-01-14

## 2020-01-07 RX ORDER — FLASH GLUCOSE SCANNING READER
EACH MISCELLANEOUS
COMMUNITY
Start: 2019-05-08 | End: 2020-07-13

## 2020-01-08 NOTE — TELEPHONE ENCOUNTER
Received call from lab re: high serum glucose of 450, I called both numbers:  [phone].. Left message to call back.   [phone].. Wrong number.

## 2020-01-10 ENCOUNTER — OFFICE (AMBULATORY)
Dept: URBAN - METROPOLITAN AREA CLINIC 64 | Facility: CLINIC | Age: 50
End: 2020-01-10

## 2020-01-10 VITALS
HEART RATE: 100 BPM | DIASTOLIC BLOOD PRESSURE: 84 MMHG | WEIGHT: 170 LBS | HEIGHT: 69 IN | SYSTOLIC BLOOD PRESSURE: 121 MMHG

## 2020-01-10 DIAGNOSIS — K31.84 GASTROPARESIS: ICD-10-CM

## 2020-01-10 DIAGNOSIS — Z86.010 PERSONAL HISTORY OF COLONIC POLYPS: ICD-10-CM

## 2020-01-10 DIAGNOSIS — R10.13 EPIGASTRIC PAIN: ICD-10-CM

## 2020-01-10 DIAGNOSIS — R19.7 DIARRHEA, UNSPECIFIED: ICD-10-CM

## 2020-01-10 PROCEDURE — 99204 OFFICE O/P NEW MOD 45 MIN: CPT | Performed by: INTERNAL MEDICINE

## 2020-01-10 RX ORDER — PANTOPRAZOLE SODIUM 40 MG/1
40 TABLET, DELAYED RELEASE ORAL
Qty: 90 | Refills: 3 | Status: COMPLETED
Start: 2020-01-10 | End: 2021-01-14

## 2020-01-14 ENCOUNTER — OFFICE VISIT (OUTPATIENT)
Dept: ENDOCRINOLOGY | Facility: CLINIC | Age: 50
End: 2020-01-14

## 2020-01-14 VITALS
DIASTOLIC BLOOD PRESSURE: 68 MMHG | OXYGEN SATURATION: 97 % | SYSTOLIC BLOOD PRESSURE: 106 MMHG | BODY MASS INDEX: 24.59 KG/M2 | WEIGHT: 166 LBS | HEART RATE: 111 BPM | HEIGHT: 69 IN

## 2020-01-14 DIAGNOSIS — E55.9 VITAMIN D DEFICIENCY: ICD-10-CM

## 2020-01-14 DIAGNOSIS — E10.40 TYPE 1 DIABETES MELLITUS WITH DIABETIC NEUROPATHY (HCC): Primary | ICD-10-CM

## 2020-01-14 DIAGNOSIS — E78.2 HYPERLIPIDEMIA, MIXED: ICD-10-CM

## 2020-01-14 DIAGNOSIS — Z96.41 INSULIN PUMP STATUS: ICD-10-CM

## 2020-01-14 DIAGNOSIS — E06.3 HYPOTHYROIDISM DUE TO HASHIMOTO'S THYROIDITIS: ICD-10-CM

## 2020-01-14 DIAGNOSIS — E03.8 HYPOTHYROIDISM DUE TO HASHIMOTO'S THYROIDITIS: ICD-10-CM

## 2020-01-14 LAB
GLUCOSE BLDC GLUCOMTR-MCNC: 311 MG/DL (ref 70–130)
KETONES UR QL: NEGATIVE

## 2020-01-14 PROCEDURE — 99214 OFFICE O/P EST MOD 30 MIN: CPT | Performed by: INTERNAL MEDICINE

## 2020-01-14 PROCEDURE — 81003 URINALYSIS AUTO W/O SCOPE: CPT | Performed by: INTERNAL MEDICINE

## 2020-01-14 PROCEDURE — 82962 GLUCOSE BLOOD TEST: CPT | Performed by: INTERNAL MEDICINE

## 2020-01-14 RX ORDER — ERGOCALCIFEROL 1.25 MG/1
CAPSULE ORAL
Qty: 12 CAPSULE | Refills: 3 | Status: SHIPPED | OUTPATIENT
Start: 2020-01-14 | End: 2020-07-13

## 2020-01-14 RX ORDER — PANTOPRAZOLE SODIUM 40 MG/1
TABLET, DELAYED RELEASE ORAL
COMMUNITY
Start: 2020-01-10 | End: 2020-01-14

## 2020-01-14 RX ORDER — ONDANSETRON 4 MG/1
TABLET, ORALLY DISINTEGRATING ORAL
Qty: 12 TABLET | Refills: 4 | Status: SHIPPED | OUTPATIENT
Start: 2020-01-14 | End: 2020-12-08

## 2020-01-14 NOTE — PATIENT INSTRUCTIONS
Increase Lantus to 30 units at night until you go back on the pump.  Check blood sugars 4x/d & record.  Call if blood sugars are running under 100 or over 200.  Restart vit D 50,000 units once a week.  F/u in 6 months, with labs prior.

## 2020-01-20 NOTE — PROGRESS NOTES
[facility]        Patient Care Team:  Nai Patton MD as PCP - General (Endocrinology)  Provider, No Known as PCP - Family Medicine  Arleen Quintana MD as Consulting Physician (Obstetrics and Gynecology)    Chief Complaint:    Chief Complaint   Patient presents with   • Diabetes     type 1         Subjective   Here for diabetes f/u  Blood sugars: doesn't have records  Ran out of pump supplies 5d ago  Off thyroid meds x 1y  Exercise program: not much    Interval History:     Patient Complaints: none  Patient Denies:  hypoglycemia  History taken from: patient    Review of Systems:   Review of Systems   Eyes: Negative for blurred vision.   Gastrointestinal: Negative for nausea.   Endocrine: Negative for polyuria.   Neurological: Negative for headache.     Lost  5 lb since last visit    Objective     Vital Signs      Vitals:    01/14/20 1226   BP: 106/68   Pulse: 111   SpO2: 97%         Physical Exam:     General Appearance:    Alert, cooperative, in no acute distress   Head:    Normocephalic, without obvious abnormality, atraumatic   Eyes:            Lids and lashes normal, conjunctivae and sclerae normal, no   icterus, no pallor, corneas clear, PERRLA   Throat:   No oral lesions,  oral mucosa moist. Tongue stud   Neck:   No adenopathy, supple, thyroid firm, no   carotid bruit   Lungs:     Clear     Heart:    Regular rhythm and normal rate   Chest Wall:    No abnormalities observed   Abdomen:     Normal bowel sounds, soft                 Extremities:   Plantar calluses, no edema               Pulses:   Pulses palpable and equal bilaterally   Skin:   No rash   Neurologic:  DTR absent in ankles, vibratory sense 50% decreased in toes, able to feel the 10g monofilament ( same as 1y ago )            Results Review:    I have reviewed the patient's new clinical results, labs & imaging.    Medication Review:   Prior to Admission medications    Medication Sig Start Date End Date Taking?  Authorizing Provider   atorvastatin (LIPITOR) 40 MG tablet Take 40 mg by mouth Daily.   Yes Daisy Cullen MD   clonazePAM (KlonoPIN) 1 MG tablet Take 1 mg by mouth 2 (Two) Times a Day As Needed for Anxiety.   Yes Daisy Cullen MD   Continuous Blood Gluc  (FREESTYLE ZEE 14 DAY READER) device FREESTYLE ZEE 14 DAY READER MICK 5/8/19  Yes Daisy Cullen MD   Continuous Blood Gluc Sensor (FREESTYLE ZEE 14 DAY SENSOR) misc FREESTYLE ZEE 14 DAY SENSOR 5/8/19  Yes Daisy Cullen MD   docusate sodium 100 MG capsule Take 100 mg by mouth 2 (Two) Times a Day As Needed for Constipation. 7/11/18  Yes Salinas Yanez MD   ibuprofen (ADVIL,MOTRIN) 600 MG tablet Take 1 tablet by mouth Every 6 (Six) Hours As Needed for Mild Pain . 7/11/18  Yes Salinas Yanez MD   insulin aspart (novoLOG) 100 UNIT/ML injection Inject per sliding scale max daily dose 80 units 1/14/20  Yes Nai Patton MD   Insulin Glargine (LANTUS SOLOSTAR) 100 UNIT/ML injection pen Inject 30 units daily 5/8/19  Yes Daisy Cullen MD   Insulin Pen Needle (BD PEN NEEDLE EARLE U/F) 32G X 4 MM misc BD PEN NEEDLE EARLE U/F 32G X 4 MM 5/8/19  Yes Daisy Cullen MD   lamoTRIgine (LAMICTAL) 200 MG tablet Take 200 mg by mouth Every Night.   Yes Daisy Cullen MD   sertraline (ZOLOFT) 100 MG tablet Take 300 mg by mouth Every Night.   Yes Daisy Cullen MD   zolpidem (AMBIEN) 10 MG tablet Take 20 mg by mouth At Night As Needed.   Yes Daisy Cullen MD   ondansetron ODT (ZOFRAN-ODT) 4 MG disintegrating tablet One under the tongue Q 12h prn nausea 1/14/20   Nai Patton MD   vitamin D (ERGOCALCIFEROL) 1.25 MG (61238 UT) capsule capsule One po weekly 1/14/20   Nai Patton MD       Lab Results (most recent)     Procedure Component Value Units Date/Time    POC Ketone, Urine [[id number]] Collected:  01/14/20 1327    Specimen:  Urine Updated:  01/14/20 1327     Ketones, UA Negative    POC  Glucose Fingerstick [[id number]]  (Abnormal) Collected:  01/14/20 1225    Specimen:  Blood Updated:  01/14/20 1226     Glucose 311 mg/dL      Comment: ate@ 2a this morning, 10 hours ago, insulin@11a this morning               Lab Results   Component Value Date    HGBA1C 13.0 (H) 01/07/2020      Lab Results   Component Value Date    GLUCOSE 450 (C) 01/07/2020    BUN 10 01/07/2020    CREATININE 0.84 01/07/2020    EGFRIFNONA 72 01/07/2020    BCR 11.9 01/07/2020    K 4.5 01/07/2020    CO2 26.3 01/07/2020    CALCIUM 9.5 01/07/2020    ALBUMIN 4.20 01/07/2020    LABIL2 1.1 11/05/2018    AST 23 01/07/2020    ALT 25 01/07/2020    CHOL 161 01/07/2020    LDL 72 01/07/2020    HDL 54 01/07/2020    TRIG 173 (H) 01/07/2020     Lab Results   Component Value Date    TSH 2.620 01/07/2020    FREET4 1.37 01/07/2020    [id number] 25.4 (L) 01/07/2020       Assessment/Plan     Karly was seen today for diabetes.    Diagnoses and all orders for this visit:    Type 1 diabetes mellitus with diabetic neuropathy (CMS/[facility])  -     POC Glucose Fingerstick  -     Hemoglobin A1c; Future  -     POC Ketone, Urine    Hyperlipidemia, mixed    Vitamin D deficiency  -     Vitamin D 25 Hydroxy; Future    Hypothyroidism due to Hashimoto's thyroiditis    Insulin pump status    Other orders  -     insulin aspart (novoLOG) 100 UNIT/ML injection; Inject per sliding scale max daily dose 80 units  -     ondansetron ODT (ZOFRAN-ODT) 4 MG disintegrating tablet; One under the tongue Q 12h prn nausea  -     vitamin D (ERGOCALCIFEROL) 1.25 MG (80731 UT) capsule capsule; One po weekly        Increase Lantus to 30 units at night until you go back on the pump.  Check blood sugars 4x/d & record.  Call if blood sugars are running under 100 or over 200.  Restart vit D 50,000 units once a week.          Nai Patton MD  01/19/20  10:19 PM

## 2020-01-23 ENCOUNTER — ON CAMPUS - OUTPATIENT (AMBULATORY)
Dept: URBAN - METROPOLITAN AREA HOSPITAL 2 | Facility: HOSPITAL | Age: 50
End: 2020-01-23

## 2020-01-23 ENCOUNTER — OFFICE (AMBULATORY)
Dept: URBAN - METROPOLITAN AREA PATHOLOGY 4 | Facility: PATHOLOGY | Age: 50
End: 2020-01-23
Payer: COMMERCIAL

## 2020-01-23 VITALS
OXYGEN SATURATION: 100 % | SYSTOLIC BLOOD PRESSURE: 103 MMHG | SYSTOLIC BLOOD PRESSURE: 101 MMHG | HEART RATE: 87 BPM | DIASTOLIC BLOOD PRESSURE: 53 MMHG | DIASTOLIC BLOOD PRESSURE: 76 MMHG | SYSTOLIC BLOOD PRESSURE: 115 MMHG | WEIGHT: 167.2 LBS | HEART RATE: 84 BPM | HEART RATE: 102 BPM | DIASTOLIC BLOOD PRESSURE: 65 MMHG | DIASTOLIC BLOOD PRESSURE: 66 MMHG | SYSTOLIC BLOOD PRESSURE: 112 MMHG | OXYGEN SATURATION: 97 % | DIASTOLIC BLOOD PRESSURE: 58 MMHG | DIASTOLIC BLOOD PRESSURE: 64 MMHG | TEMPERATURE: 98.2 F | HEART RATE: 86 BPM | SYSTOLIC BLOOD PRESSURE: 91 MMHG | SYSTOLIC BLOOD PRESSURE: 90 MMHG | OXYGEN SATURATION: 98 % | OXYGEN SATURATION: 99 % | HEART RATE: 85 BPM | RESPIRATION RATE: 16 BRPM | HEART RATE: 88 BPM | RESPIRATION RATE: 19 BRPM | SYSTOLIC BLOOD PRESSURE: 113 MMHG | RESPIRATION RATE: 18 BRPM | HEART RATE: 89 BPM | HEIGHT: 69 IN | SYSTOLIC BLOOD PRESSURE: 108 MMHG

## 2020-01-23 DIAGNOSIS — K29.70 GASTRITIS, UNSPECIFIED, WITHOUT BLEEDING: ICD-10-CM

## 2020-01-23 DIAGNOSIS — R19.7 DIARRHEA, UNSPECIFIED: ICD-10-CM

## 2020-01-23 DIAGNOSIS — R10.13 EPIGASTRIC PAIN: ICD-10-CM

## 2020-01-23 DIAGNOSIS — K29.50 UNSPECIFIED CHRONIC GASTRITIS WITHOUT BLEEDING: ICD-10-CM

## 2020-01-23 DIAGNOSIS — Z86.010 PERSONAL HISTORY OF COLONIC POLYPS: ICD-10-CM

## 2020-01-23 DIAGNOSIS — K63.5 POLYP OF COLON: ICD-10-CM

## 2020-01-23 DIAGNOSIS — D12.2 BENIGN NEOPLASM OF ASCENDING COLON: ICD-10-CM

## 2020-01-23 LAB
GI HISTOLOGY: A. SELECT: (no result)
GI HISTOLOGY: B. UNSPECIFIED: (no result)
GI HISTOLOGY: C. SELECT: (no result)
GI HISTOLOGY: PDF REPORT: (no result)

## 2020-01-23 PROCEDURE — 88305 TISSUE EXAM BY PATHOLOGIST: CPT | Performed by: INTERNAL MEDICINE

## 2020-01-23 PROCEDURE — 45380 COLONOSCOPY AND BIOPSY: CPT | Performed by: INTERNAL MEDICINE

## 2020-01-23 PROCEDURE — 43239 EGD BIOPSY SINGLE/MULTIPLE: CPT | Performed by: INTERNAL MEDICINE

## 2020-02-13 ENCOUNTER — OFFICE (AMBULATORY)
Dept: URBAN - METROPOLITAN AREA CLINIC 64 | Facility: CLINIC | Age: 50
End: 2020-02-13

## 2020-02-13 VITALS
HEART RATE: 100 BPM | HEIGHT: 69 IN | WEIGHT: 168 LBS | SYSTOLIC BLOOD PRESSURE: 94 MMHG | DIASTOLIC BLOOD PRESSURE: 68 MMHG

## 2020-02-13 DIAGNOSIS — R10.9 UNSPECIFIED ABDOMINAL PAIN: ICD-10-CM

## 2020-02-13 DIAGNOSIS — K31.84 GASTROPARESIS: ICD-10-CM

## 2020-02-13 DIAGNOSIS — K58.9 IRRITABLE BOWEL SYNDROME WITHOUT DIARRHEA: ICD-10-CM

## 2020-02-13 PROCEDURE — 99213 OFFICE O/P EST LOW 20 MIN: CPT | Performed by: INTERNAL MEDICINE

## 2020-02-13 RX ORDER — PEPPERMINT OIL 90 MG
180 CAPSULE, DELAYED, AND EXTENDED RELEASE ORAL
Qty: 60 | Refills: 12 | Status: COMPLETED
Start: 2020-02-13 | End: 2021-01-14

## 2020-06-10 DIAGNOSIS — E10.40 TYPE 1 DIABETES MELLITUS WITH DIABETIC NEUROPATHY (HCC): Primary | ICD-10-CM

## 2020-06-10 RX ORDER — PEN NEEDLE, DIABETIC 32GX 5/32"
NEEDLE, DISPOSABLE MISCELLANEOUS
Qty: 150 EACH | Refills: 4 | Status: SHIPPED | OUTPATIENT
Start: 2020-06-10

## 2020-07-13 ENCOUNTER — TELEMEDICINE (OUTPATIENT)
Dept: ENDOCRINOLOGY | Facility: CLINIC | Age: 50
End: 2020-07-13

## 2020-07-13 VITALS — WEIGHT: 170 LBS | BODY MASS INDEX: 25.18 KG/M2 | HEIGHT: 69 IN

## 2020-07-13 DIAGNOSIS — E55.9 VITAMIN D DEFICIENCY: ICD-10-CM

## 2020-07-13 DIAGNOSIS — E10.65 TYPE 1 DIABETES MELLITUS WITH HYPERGLYCEMIA (HCC): Primary | ICD-10-CM

## 2020-07-13 DIAGNOSIS — E78.2 HYPERLIPIDEMIA, MIXED: ICD-10-CM

## 2020-07-13 PROCEDURE — 99213 OFFICE O/P EST LOW 20 MIN: CPT | Performed by: INTERNAL MEDICINE

## 2020-07-13 RX ORDER — SYRINGE-NEEDLE,INSULIN,0.5 ML 27GX1/2"
SYRINGE, EMPTY DISPOSABLE MISCELLANEOUS
Qty: 300 EACH | Refills: 3 | Status: SHIPPED | OUTPATIENT
Start: 2020-07-13 | End: 2022-09-26 | Stop reason: SDUPTHER

## 2020-07-13 NOTE — PATIENT INSTRUCTIONS
Get labs done soon.  Restart vit D otc 5,000 units daily.  Continue other meds.  Call if blood sugars are running under 100 or over 200.  Increase exercise as planned.  F/u in 6 months, with labs prior.

## 2020-07-18 NOTE — PROGRESS NOTES
[facility]        Patient Care Team:  Nai Patton MD as PCP - General (Endocrinology)  Provider, No Known as PCP - Family Medicine  Arleen Quintana MD as Consulting Physician (Obstetrics and Gynecology)    Chief Complaint:    Chief Complaint   Patient presents with   • Diabetes     type 1 bs-217 ate@ 1a this morning, 7 hours ago, insulin@ 3a this morning         Subjective   Here for diabetes f/u  This is a telemedicine visit in view of the covid 19 pandemic  Blood sugars: did not send. Not testing  Exercise program: none  Ran out of vit D    Interval History:     Patient Complaints: stressed  Patient Denies:  Severe hypoglycemia  History taken from: patient    Review of Systems:   Review of Systems   Eyes: Negative for blurred vision.   Gastrointestinal: Negative for nausea.   Endocrine: Negative for polyuria.   Neurological: Negative for headache.   Psychiatric/Behavioral: Positive for stress. The patient is nervous/anxious.      Gained 4 lb since last visit    Objective     Vital Signs    There were no vitals filed for this visit.      Physical Exam: limited due to video visit     General Appearance:    Alert, cooperative, in no acute distress   Head:    Normocephalic, shaved hair for ease care during pandemic   Eyes:            Lids and lashes normal, conjunctivae and sclerae normal, no   icterus, no pallor, corneas clear, PERRLA        Results Review:    I have reviewed the patient's new clinical results, labs & imaging.    Medication Review:   Prior to Admission medications    Medication Sig Start Date End Date Taking? Authorizing Provider   atorvastatin (LIPITOR) 40 MG tablet Take 40 mg by mouth Daily.   Yes Daisy Cullen MD   clonazePAM (KlonoPIN) 1 MG tablet Take 1 mg by mouth 2 (Two) Times a Day As Needed for Anxiety.   Yes Daisy Cullen MD   insulin aspart (novoLOG) 100 UNIT/ML injection Inject per sliding scale max daily dose 80 units 1/14/20  Yes  "Nai Patton MD   Insulin Glargine (LANTUS SOLOSTAR) 100 UNIT/ML injection pen Inject 30 units daily 5/8/19  Yes Daisy Cullen MD   lamoTRIgine (LAMICTAL) 200 MG tablet Take 200 mg by mouth Every Night.   Yes Daisy Cullen MD   ondansetron ODT (ZOFRAN-ODT) 4 MG disintegrating tablet One under the tongue Q 12h prn nausea 1/14/20  Yes Nai Patton MD   SERTRALINE HCL PO Take 300 mg by mouth Every Night.   Yes Daisy Cullen MD   UNIFINCONCHITA PENTIPS 32G X 4 MM misc USE WITH INSULIN PENS 4 TIMES A DAY  6/10/20  Yes Nai Patton MD   zolpidem (AMBIEN) 10 MG tablet Take 20 mg by mouth At Night As Needed.   Yes Daisy Cullen MD   Insulin Syringe-Needle U-100 30G X 5/16\" 0.5 ML misc Use for insulin injections. 3x/d. 7/13/20   Nai Patton MD       Lab Results (most recent)     None        Lab Results   Component Value Date    HGBA1C 13.0 (H) 01/07/2020      Lab Results   Component Value Date    GLUCOSE 450 (C) 01/07/2020    BUN 10 01/07/2020    CREATININE 0.84 01/07/2020    EGFRIFNONA 72 01/07/2020    BCR 11.9 01/07/2020    K 4.5 01/07/2020    CO2 26.3 01/07/2020    CALCIUM 9.5 01/07/2020    ALBUMIN 4.20 01/07/2020    LABIL2 1.1 11/05/2018    AST 23 01/07/2020    ALT 25 01/07/2020    CHOL 161 01/07/2020    LDL 72 01/07/2020    HDL 54 01/07/2020    TRIG 173 (H) 01/07/2020     Lab Results   Component Value Date    TSH 2.620 01/07/2020    FREET4 1.37 01/07/2020    [id number] 25.4 (L) 01/07/2020     Did not have labs drawn due to the pandemic.    Assessment/Plan     Karly was seen today for diabetes.    Diagnoses and all orders for this visit:    Type 1 diabetes mellitus with hyperglycemia (CMS/[facility])  -     Insulin Syringe-Needle U-100 30G X 5/16\" 0.5 ML misc; Use for insulin injections. 3x/d.    Vitamin D deficiency    Hyperlipidemia, mixed        Get labs done soon.  Restart vit D otc 5,000 units daily.  Continue other meds.  Call if blood sugars are running under 100 or over " 200.  Increase exercise as planned.    Spent  15 min talking to pt.    Nai Patton MD  07/17/20  21:28

## 2020-12-08 RX ORDER — ONDANSETRON 4 MG/1
TABLET, ORALLY DISINTEGRATING ORAL
Qty: 12 TABLET | Refills: 4 | Status: SHIPPED | OUTPATIENT
Start: 2020-12-08 | End: 2021-03-07 | Stop reason: SDUPTHER

## 2021-01-06 ENCOUNTER — TELEPHONE (OUTPATIENT)
Dept: ENDOCRINOLOGY | Facility: CLINIC | Age: 51
End: 2021-01-06

## 2021-01-06 NOTE — TELEPHONE ENCOUNTER
Called pt and LVM to call us back.  Office rec'd ppw from [employer] for pump/CGM supplies. They are asking for last 6 months of OV, etc but pt doesnot have f/u scheduled with Dr. CALLAWAY and last OV was 7/13/2020.  Is this ppw for 630G or an upgrade or CGM only?

## 2021-01-11 ENCOUNTER — HOSPITAL ENCOUNTER (EMERGENCY)
Facility: HOSPITAL | Age: 51
Discharge: HOME | End: 2021-01-12
Attending: EMERGENCY MEDICINE | Admitting: EMERGENCY MEDICINE

## 2021-01-11 DIAGNOSIS — R10.84 GENERALIZED ABDOMINAL PAIN: Primary | ICD-10-CM

## 2021-01-11 DIAGNOSIS — R19.7 DIARRHEA, UNSPECIFIED TYPE: ICD-10-CM

## 2021-01-11 DIAGNOSIS — R11.2 NAUSEA AND VOMITING, INTRACTABILITY OF VOMITING NOT SPECIFIED, UNSPECIFIED VOMITING TYPE: ICD-10-CM

## 2021-01-11 LAB
ANION GAP SERPL CALCULATED.3IONS-SCNC: 14 MMOL/L (ref 5–15)
BASOPHILS # BLD AUTO: 0 10*3/MM3 (ref 0–0.2)
BASOPHILS NFR BLD AUTO: 0.5 % (ref 0–1.5)
BUN SERPL-MCNC: 12 MG/DL (ref 6–20)
BUN/CREAT SERPL: 14.6 (ref 7–25)
CALCIUM SPEC-SCNC: 9.4 MG/DL (ref 8.6–10.5)
CHLORIDE SERPL-SCNC: 97 MMOL/L (ref 98–107)
CO2 SERPL-SCNC: 22 MMOL/L (ref 22–29)
CREAT SERPL-MCNC: 0.82 MG/DL (ref 0.57–1)
DEPRECATED RDW RBC AUTO: 38.5 FL (ref 37–54)
EOSINOPHIL # BLD AUTO: 0 10*3/MM3 (ref 0–0.4)
EOSINOPHIL NFR BLD AUTO: 0 % (ref 0.3–6.2)
ERYTHROCYTE [DISTWIDTH] IN BLOOD BY AUTOMATED COUNT: 13.5 % (ref 12.3–15.4)
GFR SERPL CREATININE-BSD FRML MDRD: 74 ML/MIN/1.73
GLUCOSE SERPL-MCNC: 386 MG/DL (ref 65–99)
HCT VFR BLD AUTO: 42.9 % (ref 34–46.6)
HGB BLD-MCNC: 14.9 G/DL (ref 12–15.9)
LYMPHOCYTES # BLD AUTO: 3.4 10*3/MM3 (ref 0.7–3.1)
LYMPHOCYTES NFR BLD AUTO: 45.4 % (ref 19.6–45.3)
MCH RBC QN AUTO: 28.4 PG (ref 26.6–33)
MCHC RBC AUTO-ENTMCNC: 34.7 G/DL (ref 31.5–35.7)
MCV RBC AUTO: 81.9 FL (ref 79–97)
MONOCYTES # BLD AUTO: 0.4 10*3/MM3 (ref 0.1–0.9)
MONOCYTES NFR BLD AUTO: 4.7 % (ref 5–12)
NEUTROPHILS NFR BLD AUTO: 3.7 10*3/MM3 (ref 1.7–7)
NEUTROPHILS NFR BLD AUTO: 49.4 % (ref 42.7–76)
NRBC BLD AUTO-RTO: 0 /100 WBC (ref 0–0.2)
PLATELET # BLD AUTO: 423 10*3/MM3 (ref 140–450)
PMV BLD AUTO: 7.8 FL (ref 6–12)
POTASSIUM SERPL-SCNC: 3.9 MMOL/L (ref 3.5–5.2)
RBC # BLD AUTO: 5.24 10*6/MM3 (ref 3.77–5.28)
SODIUM SERPL-SCNC: 133 MMOL/L (ref 136–145)
WBC # BLD AUTO: 7.5 10*3/MM3 (ref 3.4–10.8)

## 2021-01-11 PROCEDURE — 25010000002 ONDANSETRON PER 1 MG: Performed by: EMERGENCY MEDICINE

## 2021-01-11 PROCEDURE — 85025 COMPLETE CBC W/AUTO DIFF WBC: CPT | Performed by: EMERGENCY MEDICINE

## 2021-01-11 PROCEDURE — 99283 EMERGENCY DEPT VISIT LOW MDM: CPT

## 2021-01-11 PROCEDURE — 25010000002 MORPHINE PER 10 MG: Performed by: EMERGENCY MEDICINE

## 2021-01-11 PROCEDURE — 96374 THER/PROPH/DIAG INJ IV PUSH: CPT

## 2021-01-11 PROCEDURE — 80048 BASIC METABOLIC PNL TOTAL CA: CPT | Performed by: EMERGENCY MEDICINE

## 2021-01-11 PROCEDURE — 96375 TX/PRO/DX INJ NEW DRUG ADDON: CPT

## 2021-01-11 RX ORDER — SODIUM CHLORIDE 0.9 % (FLUSH) 0.9 %
10 SYRINGE (ML) INJECTION AS NEEDED
Status: DISCONTINUED | OUTPATIENT
Start: 2021-01-11 | End: 2021-01-12 | Stop reason: HOSPADM

## 2021-01-11 RX ORDER — TRAMADOL HYDROCHLORIDE 50 MG/1
50 TABLET ORAL EVERY 6 HOURS PRN
Qty: 12 TABLET | Refills: 0 | Status: SHIPPED | OUTPATIENT
Start: 2021-01-11 | End: 2021-01-19

## 2021-01-11 RX ORDER — ONDANSETRON 2 MG/ML
4 INJECTION INTRAMUSCULAR; INTRAVENOUS ONCE
Status: COMPLETED | OUTPATIENT
Start: 2021-01-11 | End: 2021-01-11

## 2021-01-11 RX ORDER — MORPHINE SULFATE 4 MG/ML
2 INJECTION, SOLUTION INTRAMUSCULAR; INTRAVENOUS ONCE
Status: COMPLETED | OUTPATIENT
Start: 2021-01-11 | End: 2021-01-11

## 2021-01-11 RX ADMIN — ONDANSETRON 4 MG: 2 INJECTION, SOLUTION INTRAMUSCULAR; INTRAVENOUS at 23:02

## 2021-01-11 RX ADMIN — MORPHINE SULFATE 2 MG: 4 INJECTION INTRAVENOUS at 23:45

## 2021-01-11 RX ADMIN — SODIUM CHLORIDE 500 ML: 9 INJECTION, SOLUTION INTRAVENOUS at 23:02

## 2021-01-12 VITALS
HEIGHT: 69 IN | SYSTOLIC BLOOD PRESSURE: 124 MMHG | TEMPERATURE: 97.9 F | HEART RATE: 81 BPM | DIASTOLIC BLOOD PRESSURE: 72 MMHG | BODY MASS INDEX: 23.8 KG/M2 | RESPIRATION RATE: 18 BRPM | OXYGEN SATURATION: 100 % | WEIGHT: 160.72 LBS

## 2021-01-12 NOTE — ED NOTES
Pt states that she has an extensive GI history but has never had ABD pain like this before. She states that she has been having ABD for several weeks now but yesterday she started having bloody stools and extreme RUQ pain. She states that yesterday she did start having diarrhea that progressed into dark bloody stool    Call light within reach. Will continue to monitor     Henny Cardenas LPN  01/11/21 0034

## 2021-01-12 NOTE — ED PROVIDER NOTES
Subjective   Patient is a 50-year-old female complaining of vomiting and diarrhea.  She states she has blood and mucus in her stool.  This been going on for the past 3 weeks.  Patient states he has a history of persistent vomiting secondary to gastroparesis.  She states her abdominal pain is mild to moderate and constant.  She denies cough fever dysuria or other complaint          Review of Systems  Negative for headache ears throat cough fever chest pain shortness of breath dysuria achiness weight loss or other complaint  Past Medical History:   Diagnosis Date   • Anxiety    • Bipolar 2 disorder (CMS/HCC)    • Diabetes mellitus (CMS/HCC)     TYPE 1   • Diabetes mellitus type I (CMS/HCC)    • Disease of thyroid gland    • DKA (diabetic ketoacidoses) (CMS/HCC)    • High cholesterol    • Kidney stone    • Pelvic pain        Allergies   Allergen Reactions   • Prednisone Swelling     high fever/ body aches   • Nitrofurantoin Monohyd Macro Other (See Comments)     severe reaction- hospitalized       Past Surgical History:   Procedure Laterality Date   • ANKLE SURGERY Right    •  SECTION     • DIAGNOSTIC LAPAROSCOPY     • DIAGNOSTIC LAPAROSCOPY N/A 2017    Procedure: LAPAROSCOPY, PARTIAL LEFT SALPINGECTOMY;  Surgeon: Arleen Quintana MD;  Location: [facility], [place], operated by [facility];  Service:    • DILATATION AND CURETTAGE N/A 7/10/2018    Procedure: FROZEN DILATATION AND CURETTAGE POWER MORECELLATOR;  Surgeon: Nicole Shannon MD;  Location: [facility];  Service: Gynecology   • HYSTERECTOMY SUPRACERVICAL N/A 7/10/2018    Procedure: LAPAROSCOPIC SUPRACERVICAL HYSTERECTOMY WITH MORCELLATOR, LEFT SALPINGECTOMY;  Surgeon: Nicole Shannon MD;  Location: [facility];  Service: Gynecology   • HYSTEROSCOPY ENDOMETRIAL ABLATION      WITH TUBAL AND RIGHT OVARY REMOVAL   • TONSILLECTOMY         Family History   Problem Relation Age of Onset   • Malig Hyperthermia Neg Hx        Social History     Socioeconomic History    • Marital status:      Spouse name: Not on file   • Number of children: Not on file   • Years of education: Not on file   • Highest education level: Not on file   Tobacco Use   • Smoking status: Former Smoker     Years: 25.00     Types: Cigarettes     Quit date: 7/5/2017     Years since quitting: 3.5   • Smokeless tobacco: Former User   • Tobacco comment: Currently using vaping   Substance and Sexual Activity   • Alcohol use: No   • Drug use: No   • Sexual activity: Defer           Objective   Physical Exam  HEENT exam shows TMs to be clear.  Oropharynx comers but sclerae nonicteric.  Neck has no adenopathy JVD or bruits.  Lungs are clear.  Heart has a regular rate rhythm without murmur rub or gallop.  Chest is nontender.  Abdomen is soft with mild diffuse tenderness.  Patient has normal bowel sounds without rebound or guard.  Back has no CVA times per rectal exam is heme-negative.  Procedures           ED Course           Results for orders placed or performed during the hospital encounter of 01/11/21   Basic Metabolic Panel    Specimen: Blood   Result Value Ref Range    Glucose 386 (H) 65 - 99 mg/dL    BUN 12 6 - 20 mg/dL    Creatinine 0.82 0.57 - 1.00 mg/dL    Sodium 133 (L) 136 - 145 mmol/L    Potassium 3.9 3.5 - 5.2 mmol/L    Chloride 97 (L) 98 - 107 mmol/L    CO2 22.0 22.0 - 29.0 mmol/L    Calcium 9.4 8.6 - 10.5 mg/dL    eGFR Non African Amer 74 >60 mL/min/1.73    BUN/Creatinine Ratio 14.6 7.0 - 25.0    Anion Gap 14.0 5.0 - 15.0 mmol/L   CBC Auto Differential    Specimen: Blood   Result Value Ref Range    WBC 7.50 3.40 - 10.80 10*3/mm3    RBC 5.24 3.77 - 5.28 10*6/mm3    Hemoglobin 14.9 12.0 - 15.9 g/dL    Hematocrit 42.9 34.0 - 46.6 %    MCV 81.9 79.0 - 97.0 fL    MCH 28.4 26.6 - 33.0 pg    MCHC 34.7 31.5 - 35.7 g/dL    RDW 13.5 12.3 - 15.4 %    RDW-SD 38.5 37.0 - 54.0 fl    MPV 7.8 6.0 - 12.0 fL    Platelets 423 140 - 450 10*3/mm3    Neutrophil % 49.4 42.7 - 76.0 %    Lymphocyte % 45.4 (H) 19.6 -  45.3 %    Monocyte % 4.7 (L) 5.0 - 12.0 %    Eosinophil % 0.0 (L) 0.3 - 6.2 %    Basophil % 0.5 0.0 - 1.5 %    Neutrophils, Absolute 3.70 1.70 - 7.00 10*3/mm3    Lymphocytes, Absolute 3.40 (H) 0.70 - 3.10 10*3/mm3    Monocytes, Absolute 0.40 0.10 - 0.90 10*3/mm3    Eosinophils, Absolute 0.00 0.00 - 0.40 10*3/mm3    Basophils, Absolute 0.00 0.00 - 0.20 10*3/mm3    nRBC 0.0 0.0 - 0.2 /100 WBC                                     MDM  Number of Diagnoses or Management Options  Diagnosis management comments: Patient has a benign physical exam.  She has no evidence of acute infectious process.  Metabolic panel is at baseline without dehydration.  Patient was given IV fluids as well as Zofran and morphine with mild improvement.  She will be discharged with placed on Ultram.  She will follow with her gastroenterologist.       Amount and/or Complexity of Data Reviewed  Clinical lab tests: reviewed    Risk of Complications, Morbidity, and/or Mortality  Presenting problems: high  Diagnostic procedures: high  Management options: high    Patient Progress  Patient progress: stable      Final diagnoses:   Generalized abdominal pain   Nausea and vomiting, intractability of vomiting not specified, unspecified vomiting type   Diarrhea, unspecified type            Faisal Merino MD  01/11/21 8248

## 2021-01-14 ENCOUNTER — OFFICE (AMBULATORY)
Dept: URBAN - METROPOLITAN AREA CLINIC 64 | Facility: CLINIC | Age: 51
End: 2021-01-14

## 2021-01-14 VITALS
HEART RATE: 93 BPM | SYSTOLIC BLOOD PRESSURE: 131 MMHG | WEIGHT: 155 LBS | HEIGHT: 69 IN | DIASTOLIC BLOOD PRESSURE: 87 MMHG

## 2021-01-14 DIAGNOSIS — R11.2 NAUSEA WITH VOMITING, UNSPECIFIED: ICD-10-CM

## 2021-01-14 DIAGNOSIS — E11.9 TYPE 2 DIABETES MELLITUS WITHOUT COMPLICATIONS: ICD-10-CM

## 2021-01-14 DIAGNOSIS — R10.12 LEFT UPPER QUADRANT PAIN: ICD-10-CM

## 2021-01-14 DIAGNOSIS — K31.84 GASTROPARESIS: ICD-10-CM

## 2021-01-14 PROCEDURE — 99214 OFFICE O/P EST MOD 30 MIN: CPT | Performed by: NURSE PRACTITIONER

## 2021-01-14 RX ORDER — HYOSCYAMINE SULFATE 0.12 MG/1
0.38 TABLET ORAL; SUBLINGUAL
Qty: 30 | Refills: 1 | Status: COMPLETED
Start: 2021-01-14 | End: 2021-10-29

## 2021-01-14 RX ORDER — PROMETHAZINE HYDROCHLORIDE 25 MG/ML
37.5 INJECTION INTRAMUSCULAR; INTRAVENOUS
Qty: 20 | Refills: 1 | Status: COMPLETED
Start: 2021-01-14 | End: 2021-10-29

## 2021-01-14 RX ORDER — OMEPRAZOLE 40 MG/1
40 CAPSULE, DELAYED RELEASE ORAL
Qty: 90 | Refills: 3 | Status: COMPLETED
Start: 2021-01-14 | End: 2021-10-29

## 2021-01-14 RX ORDER — ERYTHROMYCIN ETHYLSUCCINATE 200 MG/5ML
4000 GRANULE, FOR SUSPENSION ORAL
Qty: 600 | Refills: 11 | Status: COMPLETED
Start: 2021-01-14 | End: 2021-10-29

## 2021-01-15 ENCOUNTER — LAB (OUTPATIENT)
Dept: LAB | Facility: HOSPITAL | Age: 51
End: 2021-01-15

## 2021-01-15 DIAGNOSIS — E10.40 TYPE 1 DIABETES MELLITUS WITH DIABETIC NEUROPATHY (HCC): ICD-10-CM

## 2021-01-15 DIAGNOSIS — E55.9 VITAMIN D DEFICIENCY: Primary | ICD-10-CM

## 2021-01-15 DIAGNOSIS — E78.2 HYPERLIPIDEMIA, MIXED: ICD-10-CM

## 2021-01-15 DIAGNOSIS — E06.3 HYPOTHYROIDISM DUE TO HASHIMOTO'S THYROIDITIS: ICD-10-CM

## 2021-01-15 DIAGNOSIS — E03.8 HYPOTHYROIDISM DUE TO HASHIMOTO'S THYROIDITIS: ICD-10-CM

## 2021-01-15 DIAGNOSIS — E10.65 TYPE 1 DIABETES MELLITUS WITH HYPERGLYCEMIA (HCC): ICD-10-CM

## 2021-01-15 LAB — 25(OH)D3 SERPL-MCNC: 23.7 NG/ML (ref 30–100)

## 2021-01-15 PROCEDURE — 82043 UR ALBUMIN QUANTITATIVE: CPT

## 2021-01-15 PROCEDURE — 36415 COLL VENOUS BLD VENIPUNCTURE: CPT

## 2021-01-15 PROCEDURE — 82306 VITAMIN D 25 HYDROXY: CPT

## 2021-01-15 PROCEDURE — 84443 ASSAY THYROID STIM HORMONE: CPT

## 2021-01-15 PROCEDURE — 82570 ASSAY OF URINE CREATININE: CPT

## 2021-01-15 PROCEDURE — 80053 COMPREHEN METABOLIC PANEL: CPT

## 2021-01-15 PROCEDURE — 83036 HEMOGLOBIN GLYCOSYLATED A1C: CPT

## 2021-01-15 PROCEDURE — 80061 LIPID PANEL: CPT

## 2021-01-15 PROCEDURE — 84439 ASSAY OF FREE THYROXINE: CPT

## 2021-01-17 LAB — HBA1C MFR BLD: 10.1 % (ref 3.5–5.6)

## 2021-01-18 DIAGNOSIS — E78.2 HYPERLIPIDEMIA, MIXED: ICD-10-CM

## 2021-01-18 DIAGNOSIS — E10.65 TYPE 1 DIABETES MELLITUS WITH HYPERGLYCEMIA (HCC): Primary | ICD-10-CM

## 2021-01-18 DIAGNOSIS — E06.3 HYPOTHYROIDISM DUE TO HASHIMOTO'S THYROIDITIS: ICD-10-CM

## 2021-01-18 DIAGNOSIS — E03.8 HYPOTHYROIDISM DUE TO HASHIMOTO'S THYROIDITIS: ICD-10-CM

## 2021-01-18 LAB
ALBUMIN SERPL-MCNC: 4.3 G/DL (ref 3.5–5.2)
ALBUMIN UR-MCNC: 28.7 MG/DL
ALBUMIN/GLOB SERPL: 1.5 G/DL
ALP SERPL-CCNC: 109 U/L (ref 39–117)
ALT SERPL W P-5'-P-CCNC: 15 U/L (ref 1–33)
ANION GAP SERPL CALCULATED.3IONS-SCNC: 17.8 MMOL/L (ref 5–15)
AST SERPL-CCNC: 19 U/L (ref 1–32)
BILIRUB SERPL-MCNC: 0.4 MG/DL (ref 0–1.2)
BUN SERPL-MCNC: 7 MG/DL (ref 6–20)
BUN/CREAT SERPL: 9.3 (ref 7–25)
CALCIUM SPEC-SCNC: 9.5 MG/DL (ref 8.6–10.5)
CHLORIDE SERPL-SCNC: 98 MMOL/L (ref 98–107)
CHOLEST SERPL-MCNC: 183 MG/DL (ref 0–200)
CO2 SERPL-SCNC: 20.2 MMOL/L (ref 22–29)
CREAT SERPL-MCNC: 0.75 MG/DL (ref 0.57–1)
CREAT UR-MCNC: 243.9 MG/DL
GFR SERPL CREATININE-BSD FRML MDRD: 82 ML/MIN/1.73
GLOBULIN UR ELPH-MCNC: 2.9 GM/DL
GLUCOSE SERPL-MCNC: 229 MG/DL (ref 65–99)
HDLC SERPL-MCNC: 51 MG/DL (ref 40–60)
LDLC SERPL CALC-MCNC: 96 MG/DL (ref 0–100)
LDLC/HDLC SERPL: 1.76 {RATIO}
MICROALBUMIN/CREAT UR: 117.7 MG/G
POTASSIUM SERPL-SCNC: 3.8 MMOL/L (ref 3.5–5.2)
PROT SERPL-MCNC: 7.2 G/DL (ref 6–8.5)
SODIUM SERPL-SCNC: 136 MMOL/L (ref 136–145)
T4 FREE SERPL-MCNC: 1.77 NG/DL (ref 0.93–1.7)
TRIGL SERPL-MCNC: 212 MG/DL (ref 0–150)
TSH SERPL DL<=0.05 MIU/L-ACNC: 1.31 UIU/ML (ref 0.27–4.2)
VLDLC SERPL-MCNC: 36 MG/DL (ref 5–40)

## 2021-01-19 ENCOUNTER — TELEMEDICINE (OUTPATIENT)
Dept: ENDOCRINOLOGY | Facility: CLINIC | Age: 51
End: 2021-01-19

## 2021-01-19 VITALS — BODY MASS INDEX: 23.7 KG/M2 | WEIGHT: 160 LBS | HEIGHT: 69 IN

## 2021-01-19 DIAGNOSIS — E10.40 TYPE 1 DIABETES MELLITUS WITH DIABETIC NEUROPATHY (HCC): Primary | ICD-10-CM

## 2021-01-19 DIAGNOSIS — E78.2 HYPERLIPIDEMIA, MIXED: ICD-10-CM

## 2021-01-19 DIAGNOSIS — E55.9 VITAMIN D DEFICIENCY: ICD-10-CM

## 2021-01-19 PROCEDURE — 99214 OFFICE O/P EST MOD 30 MIN: CPT | Performed by: INTERNAL MEDICINE

## 2021-01-19 RX ORDER — SERTRALINE HYDROCHLORIDE 100 MG/1
200 TABLET, FILM COATED ORAL
COMMUNITY
Start: 2021-01-02 | End: 2022-08-03

## 2021-01-19 RX ORDER — OMEPRAZOLE 40 MG/1
CAPSULE, DELAYED RELEASE ORAL
COMMUNITY
Start: 2021-01-14 | End: 2022-08-03

## 2021-01-19 RX ORDER — ERYTHROMYCIN ETHYLSUCCINATE 200 MG/5ML
SUSPENSION ORAL
COMMUNITY
Start: 2021-01-14 | End: 2021-07-27

## 2021-01-19 NOTE — PROGRESS NOTES
[facility]        Patient Care Team:  Renetta Cullen Known as PCP - General    Chief Complaint:    Chief Complaint   Patient presents with   • Diabetes     type 1, bs 55, currently drinking juice          Subjective   Here for diabetes f/u  This pt consented to this telemedicine visit in view of the covid 19 pandemic  Blood sugars: did not send records  Checks blood sugars 4x/d for the last 3 months. Adjusts insulin dose per sliding scale based on results  Injects insulin 4x/d for the last 6 months  Exercise program: not much  Due for eye exam  Off vit D x 2 months    Interval History:     Patient Complaints: Starting a 21d trial of erythromycin for gastroparesis  Patient Denies: severe hypoglycemia  History taken from: patient    Review of Systems:   Review of Systems   Eyes: Negative for blurred vision.   Gastrointestinal: Negative for nausea.   Endocrine: Negative for polyuria.   Neurological: Negative for headache.     Lost  10 lb since last visit    Objective     Vital Signs    There were no vitals filed for this visit.      Physical Exam: limited due to video visit     General Appearance:    Alert, cooperative, in no acute distress   Head:    Normocephalic, without obvious abnormality, atraumatic   Eyes:            Lids and lashes normal, conjunctivae and sclerae normal, no   icterus, no pallor, corneas clear, PERRLA        Results Review:    I have reviewed the patient's new clinical results, labs & imaging.    Medication Review:   Prior to Admission medications    Medication Sig Start Date End Date Taking? Authorizing Provider   atorvastatin (LIPITOR) 40 MG tablet Take 40 mg by mouth Daily.   Yes Daisy Cullen MD   clonazePAM (KlonoPIN) 1 MG tablet Take 1 mg by mouth 2 (Two) Times a Day As Needed for Anxiety.   Yes Daisy Cullen MD   erythromycin ethylsuccinate (EES) 200 MG/5ML suspension  1/14/21  Yes Daisy Cullen MD   insulin aspart (novoLOG) 100 UNIT/ML  "injection Inject per sliding scale max daily dose 80 units 1/14/20  Yes Nai Patton MD   Insulin Glargine (LANTUS SOLOSTAR) 100 UNIT/ML injection pen Inject 25 units daily 5/8/19  Yes Daisy Cullen MD   Insulin Syringe-Needle U-100 30G X 5/16\" 0.5 ML misc Use for insulin injections. 3x/d. 7/13/20  Yes Nai Patton MD   lamoTRIgine (LAMICTAL) 200 MG tablet Take 200 mg by mouth Every Night.   Yes Daisy Cullen MD   omeprazole (priLOSEC) 40 MG capsule  1/14/21  Yes Daisy Cullen MD   ondansetron ODT (ZOFRAN-ODT) 4 MG disintegrating tablet DISSOLVE ONE TABET UNDER THE TONGUE EVERY 12 HOURS AS NEEDED FOR NAUSEA 12/8/20  Yes Nai Patton MD   sertraline (ZOLOFT) 100 MG tablet 200 mg. 1/2/21  Yes Daisy Cullen MD   UNIFINE PENTIPS 32G X 4 MM misc USE WITH INSULIN PENS 4 TIMES A DAY  6/10/20  Yes Nai Patton MD   zolpidem (AMBIEN) 10 MG tablet Take 20 mg by mouth At Night As Needed.   Yes Daisy Cullen MD   SERTRALINE HCL PO Take 300 mg by mouth Every Night.  1/19/21  Daisy Cullen MD   traMADol (ULTRAM) 50 MG tablet Take 1 tablet by mouth Every 6 (Six) Hours As Needed for Severe Pain . 1/11/21 1/19/21  Faisal Merino MD       Lab Results (most recent)     None        Lab Results   Component Value Date    HGBA1C 10.1 (H) 01/15/2021    HGBA1C 13.0 (H) 01/07/2020      Lab Results   Component Value Date    GLUCOSE 229 (H) 01/15/2021    BUN 7 01/15/2021    CREATININE 0.75 01/15/2021    EGFRIFNONA 82 01/15/2021    BCR 9.3 01/15/2021    K 3.8 01/15/2021    CO2 20.2 (L) 01/15/2021    CALCIUM 9.5 01/15/2021    ALBUMIN 4.30 01/15/2021    LABIL2 1.1 11/05/2018    AST 19 01/15/2021    ALT 15 01/15/2021    CHOL 183 01/15/2021    LDL 96 01/15/2021    HDL 51 01/15/2021    TRIG 212 (H) 01/15/2021     Lab Results   Component Value Date    TSH 1.310 01/15/2021    FREET4 1.77 (H) 01/15/2021    [id number] 23.7 (L) 01/15/2021       Assessment/Plan     Diagnoses and all " orders for this visit:    1. Type 1 diabetes mellitus with diabetic neuropathy (CMS/[facility]) (Primary)  -     Hemoglobin A1c; Future    2. Hyperlipidemia, mixed    3. Vitamin D deficiency  -     Vitamin D 25 Hydroxy; Future    Glucose control improved. Lipids stable. Vit D not at goal.    Increase exercise as planned.  Restart vit D one daily x 1 week. Then one weekly.  See eye doctor.  Continue diabetes & chol meds.  Call if blood sugars are running under 100 or over 200.        Nai Patton MD  01/19/21  10:06 EST

## 2021-01-20 ENCOUNTER — TELEPHONE (OUTPATIENT)
Dept: ENDOCRINOLOGY | Facility: CLINIC | Age: 51
End: 2021-01-20

## 2021-02-01 RX ORDER — INSULIN GLARGINE 100 [IU]/ML
INJECTION, SOLUTION SUBCUTANEOUS
Qty: 15 ML | Refills: 25 | Status: SHIPPED | OUTPATIENT
Start: 2021-02-01 | End: 2022-09-26 | Stop reason: SDUPTHER

## 2021-02-22 ENCOUNTER — TELEPHONE (OUTPATIENT)
Dept: ENDOCRINOLOGY | Facility: CLINIC | Age: 51
End: 2021-02-22

## 2021-02-22 NOTE — TELEPHONE ENCOUNTER
On MD desk for signature and then will be faxed with recent OV to [employer] for [employer] 630G sensors

## 2021-03-07 ENCOUNTER — HOSPITAL ENCOUNTER (EMERGENCY)
Facility: HOSPITAL | Age: 51
Discharge: HOME | End: 2021-03-07
Admitting: EMERGENCY MEDICINE

## 2021-03-07 VITALS
DIASTOLIC BLOOD PRESSURE: 83 MMHG | SYSTOLIC BLOOD PRESSURE: 126 MMHG | WEIGHT: 160.94 LBS | HEART RATE: 87 BPM | BODY MASS INDEX: 23.84 KG/M2 | TEMPERATURE: 98 F | OXYGEN SATURATION: 100 % | HEIGHT: 69 IN | RESPIRATION RATE: 18 BRPM

## 2021-03-07 DIAGNOSIS — R19.7 DIARRHEA, UNSPECIFIED TYPE: Primary | ICD-10-CM

## 2021-03-07 LAB
ADV 40+41 DNA STL QL NAA+NON-PROBE: NOT DETECTED
ALBUMIN SERPL-MCNC: 3.8 G/DL (ref 3.5–5.2)
ALBUMIN/GLOB SERPL: 1.2 G/DL
ALP SERPL-CCNC: 103 U/L (ref 39–117)
ALT SERPL W P-5'-P-CCNC: 11 U/L (ref 1–33)
ANION GAP SERPL CALCULATED.3IONS-SCNC: 10 MMOL/L (ref 5–15)
AST SERPL-CCNC: 17 U/L (ref 1–32)
ASTRO TYP 1-8 RNA STL QL NAA+NON-PROBE: NOT DETECTED
BASOPHILS # BLD AUTO: 0.1 10*3/MM3 (ref 0–0.2)
BASOPHILS NFR BLD AUTO: 1.1 % (ref 0–1.5)
BILIRUB SERPL-MCNC: 0.3 MG/DL (ref 0–1.2)
BILIRUB UR QL STRIP: NEGATIVE
BUN SERPL-MCNC: 9 MG/DL (ref 6–20)
BUN/CREAT SERPL: 10.2 (ref 7–25)
C CAYETANENSIS DNA STL QL NAA+NON-PROBE: NOT DETECTED
CALCIUM SPEC-SCNC: 9.6 MG/DL (ref 8.6–10.5)
CAMPY SP DNA.DIARRHEA STL QL NAA+PROBE: NOT DETECTED
CHLORIDE SERPL-SCNC: 105 MMOL/L (ref 98–107)
CLARITY UR: ABNORMAL
CO2 SERPL-SCNC: 26 MMOL/L (ref 22–29)
COLOR UR: ABNORMAL
CREAT SERPL-MCNC: 0.88 MG/DL (ref 0.57–1)
CRYPTOSP STL CULT: NOT DETECTED
DEPRECATED RDW RBC AUTO: 41.6 FL (ref 37–54)
E HISTOLYT AG STL-ACNC: NOT DETECTED
EAEC PAA PLAS AGGR+AATA ST NAA+NON-PRB: NOT DETECTED
EC STX1 + STX2 GENES STL NAA+PROBE: NOT DETECTED
EOSINOPHIL # BLD AUTO: 0 10*3/MM3 (ref 0–0.4)
EOSINOPHIL NFR BLD AUTO: 0 % (ref 0.3–6.2)
EPEC EAE GENE STL QL NAA+NON-PROBE: NOT DETECTED
ERYTHROCYTE [DISTWIDTH] IN BLOOD BY AUTOMATED COUNT: 14.1 % (ref 12.3–15.4)
ETEC LTA+ST1A+ST1B TOX ST NAA+NON-PROBE: NOT DETECTED
G LAMBLIA DNA SPEC QL NAA+PROBE: NOT DETECTED
GFR SERPL CREATININE-BSD FRML MDRD: 68 ML/MIN/1.73
GLOBULIN UR ELPH-MCNC: 3.2 GM/DL
GLUCOSE BLDC GLUCOMTR-MCNC: 69 MG/DL (ref 70–105)
GLUCOSE SERPL-MCNC: 112 MG/DL (ref 65–99)
GLUCOSE UR STRIP-MCNC: NEGATIVE MG/DL
HCT VFR BLD AUTO: 36 % (ref 34–46.6)
HGB BLD-MCNC: 12.8 G/DL (ref 12–15.9)
HGB UR QL STRIP.AUTO: NEGATIVE
KETONES UR QL STRIP: ABNORMAL
LEUKOCYTE ESTERASE UR QL STRIP.AUTO: NEGATIVE
LIPASE SERPL-CCNC: 15 U/L (ref 13–60)
LYMPHOCYTES # BLD AUTO: 3.3 10*3/MM3 (ref 0.7–3.1)
LYMPHOCYTES NFR BLD AUTO: 49.7 % (ref 19.6–45.3)
MCH RBC QN AUTO: 29.3 PG (ref 26.6–33)
MCHC RBC AUTO-ENTMCNC: 35.5 G/DL (ref 31.5–35.7)
MCV RBC AUTO: 82.3 FL (ref 79–97)
MONOCYTES # BLD AUTO: 0.4 10*3/MM3 (ref 0.1–0.9)
MONOCYTES NFR BLD AUTO: 6.1 % (ref 5–12)
NEUTROPHILS NFR BLD AUTO: 2.9 10*3/MM3 (ref 1.7–7)
NEUTROPHILS NFR BLD AUTO: 43.1 % (ref 42.7–76)
NITRITE UR QL STRIP: NEGATIVE
NOROVIRUS GI+II RNA STL QL NAA+NON-PROBE: NOT DETECTED
NRBC BLD AUTO-RTO: 0.1 /100 WBC (ref 0–0.2)
P SHIGELLOIDES DNA STL QL NAA+PROBE: NOT DETECTED
PH UR STRIP.AUTO: 5.5 [PH] (ref 5–8)
PLATELET # BLD AUTO: 429 10*3/MM3 (ref 140–450)
PMV BLD AUTO: 7.5 FL (ref 6–12)
POTASSIUM SERPL-SCNC: 4.7 MMOL/L (ref 3.5–5.2)
PROT SERPL-MCNC: 7 G/DL (ref 6–8.5)
PROT UR QL STRIP: ABNORMAL
RBC # BLD AUTO: 4.38 10*6/MM3 (ref 3.77–5.28)
RV RNA STL NAA+PROBE: NOT DETECTED
SALMONELLA DNA SPEC QL NAA+PROBE: NOT DETECTED
SAPO I+II+IV+V RNA STL QL NAA+NON-PROBE: NOT DETECTED
SHIGELLA SP+EIEC IPAH STL QL NAA+PROBE: NOT DETECTED
SODIUM SERPL-SCNC: 141 MMOL/L (ref 136–145)
SP GR UR STRIP: 1.02 (ref 1–1.03)
UROBILINOGEN UR QL STRIP: ABNORMAL
V CHOLERAE DNA SPEC QL NAA+PROBE: NOT DETECTED
VIBRIO DNA SPEC NAA+PROBE: NOT DETECTED
WBC # BLD AUTO: 6.7 10*3/MM3 (ref 3.4–10.8)
YERSINIA STL CULT: NOT DETECTED

## 2021-03-07 PROCEDURE — 25010000002 ONDANSETRON PER 1 MG: Performed by: NURSE PRACTITIONER

## 2021-03-07 PROCEDURE — 80053 COMPREHEN METABOLIC PANEL: CPT | Performed by: NURSE PRACTITIONER

## 2021-03-07 PROCEDURE — 0097U HC BIOFIRE FILMARRAY GI PANEL: CPT | Performed by: NURSE PRACTITIONER

## 2021-03-07 PROCEDURE — 99284 EMERGENCY DEPT VISIT MOD MDM: CPT

## 2021-03-07 PROCEDURE — 82962 GLUCOSE BLOOD TEST: CPT

## 2021-03-07 PROCEDURE — 83690 ASSAY OF LIPASE: CPT | Performed by: NURSE PRACTITIONER

## 2021-03-07 PROCEDURE — 81003 URINALYSIS AUTO W/O SCOPE: CPT | Performed by: NURSE PRACTITIONER

## 2021-03-07 PROCEDURE — 87324 CLOSTRIDIUM AG IA: CPT | Performed by: NURSE PRACTITIONER

## 2021-03-07 PROCEDURE — 87449 NOS EACH ORGANISM AG IA: CPT | Performed by: NURSE PRACTITIONER

## 2021-03-07 PROCEDURE — 96376 TX/PRO/DX INJ SAME DRUG ADON: CPT

## 2021-03-07 PROCEDURE — 96374 THER/PROPH/DIAG INJ IV PUSH: CPT

## 2021-03-07 PROCEDURE — 85025 COMPLETE CBC W/AUTO DIFF WBC: CPT | Performed by: NURSE PRACTITIONER

## 2021-03-07 RX ORDER — SODIUM CHLORIDE 0.9 % (FLUSH) 0.9 %
10 SYRINGE (ML) INJECTION AS NEEDED
Status: DISCONTINUED | OUTPATIENT
Start: 2021-03-07 | End: 2021-03-07 | Stop reason: HOSPADM

## 2021-03-07 RX ORDER — ONDANSETRON 2 MG/ML
4 INJECTION INTRAMUSCULAR; INTRAVENOUS ONCE
Status: COMPLETED | OUTPATIENT
Start: 2021-03-07 | End: 2021-03-07

## 2021-03-07 RX ORDER — ONDANSETRON 4 MG/1
4 TABLET, FILM COATED ORAL EVERY 6 HOURS PRN
Qty: 11 TABLET | Refills: 0 | Status: SHIPPED | OUTPATIENT
Start: 2021-03-07 | End: 2022-01-26 | Stop reason: SDUPTHER

## 2021-03-07 RX ADMIN — Medication 10 ML: at 17:18

## 2021-03-07 RX ADMIN — ONDANSETRON 4 MG: 2 INJECTION INTRAMUSCULAR; INTRAVENOUS at 14:55

## 2021-03-07 RX ADMIN — SODIUM CHLORIDE 1000 ML: 0.9 INJECTION, SOLUTION INTRAVENOUS at 15:14

## 2021-03-07 RX ADMIN — ONDANSETRON 4 MG: 2 INJECTION INTRAMUSCULAR; INTRAVENOUS at 17:15

## 2021-03-07 NOTE — ED NOTES
Pt given warm blanket. Pt has no complaints or requests at this time.     Clifton Pierre  03/07/21 4717

## 2021-03-07 NOTE — DISCHARGE INSTRUCTIONS
Increase fluid intake.  Take Zofran as needed for nausea.  Follow-up primary care provider tomorrow.  If stool culture or C. difficile results come back positive someone from [place] will contact you.  You may also have your primary care provider check results.

## 2021-03-07 NOTE — ED PROVIDER NOTES
Subjective   History: Patient is a 50-year-old type I diabetic complains of diarrhea.  Continues to have urinary frequency and burning.  Reports diarrhea 2-3 times per day.  Has had some chills.  States 2 weeks ago she had a urinary tract infection was placed on antibiotics and it resolved.  Then 5 days ago had increased frequency and burning with urination went to urgent care center was told she had another urinary tract infection and placed on cipro, states they called her last night with urine culture positive for E. coli.  Reports diarrhea became worse 3 days ago.  States she has had nausea vomiting cannot keep any liquids down but reports that is not new, she has a history of gastroparesis.  Reports she still has frequency and burning with urination but it has improved slightly.  Denies headache fever reports she has had some chills, no chest pain shortness of breath or constipation.      Onset: 3 days  Location:   Duration: Waxes and wanes  Character: Diarrhea  Aggravating/Alleviating factors: None  Radiation none  Severity: Moderate            Review of Systems   Constitutional: Positive for fatigue. Negative for chills and fever.   HENT: Negative for congestion, sore throat, tinnitus and trouble swallowing.    Eyes: Negative for photophobia, discharge and visual disturbance.   Respiratory: Negative for cough and shortness of breath.    Cardiovascular: Negative for chest pain.   Gastrointestinal: Positive for abdominal pain, diarrhea, nausea and vomiting.   Genitourinary: Positive for dysuria and frequency. Negative for urgency.   Musculoskeletal: Positive for back pain. Negative for myalgias.   Skin: Negative for rash.   Neurological: Negative for dizziness and headaches.   Psychiatric/Behavioral: Negative for confusion.       Past Medical History:   Diagnosis Date   • Anxiety    • Bipolar 2 disorder (CMS/[facility])    • Diabetes mellitus (CMS/[facility])     TYPE 1   • Diabetes mellitus type I (CMS/[facility])    • Disease of  thyroid gland    • DKA (diabetic ketoacidoses) (CMS/HCC)    • High cholesterol    • Kidney stone    • Pelvic pain        Allergies   Allergen Reactions   • Prednisone Swelling     high fever/ body aches   • Nitrofurantoin Monohyd Macro Other (See Comments)     severe reaction- hospitalized       Past Surgical History:   Procedure Laterality Date   • ANKLE SURGERY Right 2015   •  SECTION     • DIAGNOSTIC LAPAROSCOPY     • DIAGNOSTIC LAPAROSCOPY N/A 2017    Procedure: LAPAROSCOPY, PARTIAL LEFT SALPINGECTOMY;  Surgeon: Arleen Quintana MD;  Location: [facility];  Service:    • DILATATION AND CURETTAGE N/A 7/10/2018    Procedure: FROZEN DILATATION AND CURETTAGE POWER MORECELLATOR;  Surgeon: Nicole Shannon MD;  Location: [facility];  Service: Gynecology   • HYSTERECTOMY SUPRACERVICAL N/A 7/10/2018    Procedure: LAPAROSCOPIC SUPRACERVICAL HYSTERECTOMY WITH MORCELLATOR, LEFT SALPINGECTOMY;  Surgeon: Nicole Shannon MD;  Location: [facility];  Service: Gynecology   • HYSTEROSCOPY ENDOMETRIAL ABLATION      WITH TUBAL AND RIGHT OVARY REMOVAL   • TONSILLECTOMY         Family History   Problem Relation Age of Onset   • Malig Hyperthermia Neg Hx        Social History     Socioeconomic History   • Marital status:      Spouse name: Not on file   • Number of children: Not on file   • Years of education: Not on file   • Highest education level: Not on file   Tobacco Use   • Smoking status: Former Smoker     Years: 25.00     Types: Cigarettes     Quit date: 2017     Years since quitting: 3.6   • Smokeless tobacco: Former User   • Tobacco comment: Currently using vaping   Substance and Sexual Activity   • Alcohol use: No   • Drug use: No   • Sexual activity: Defer           Objective   Physical Exam  Vitals reviewed.   Constitutional:       General: She is not in acute distress.     Appearance: She is normal weight. She is not toxic-appearing.   HENT:      Head: Normocephalic and  "atraumatic.      Right Ear: Tympanic membrane normal.      Left Ear: Tympanic membrane normal.      Mouth/Throat:      Mouth: Mucous membranes are moist.      Pharynx: Oropharynx is clear.   Eyes:      Pupils: Pupils are equal, round, and reactive to light.   Cardiovascular:      Rate and Rhythm: Normal rate.      Pulses: Normal pulses.      Heart sounds: Normal heart sounds. No murmur.   Pulmonary:      Effort: Pulmonary effort is normal.      Breath sounds: Normal breath sounds. No stridor. No wheezing, rhonchi or rales.   Abdominal:      General: Abdomen is flat. Bowel sounds are normal.      Palpations: Abdomen is soft.      Tenderness: There is generalized abdominal tenderness. There is no right CVA tenderness or left CVA tenderness.   Musculoskeletal:         General: Normal range of motion.      Cervical back: Normal range of motion and neck supple.   Skin:     General: Skin is warm and dry.      Coloration: Skin is pale.   Neurological:      Mental Status: She is alert and oriented to person, place, and time.   Psychiatric:         Mood and Affect: Mood normal.         Behavior: Behavior normal.         Thought Content: Thought content normal.         Judgment: Judgment normal.         Procedures           ED Course    /78   Pulse 84   Temp 97.5 °F (36.4 °C)   Resp 15   Ht 175.3 cm (69\")   Wt 73 kg (160 lb 15 oz)   LMP 06/06/2018 (Approximate)   SpO2 100%   BMI 23.77 kg/m²   Labs Reviewed   COMPREHENSIVE METABOLIC PANEL - Abnormal; Notable for the following components:       Result Value    Glucose 112 (*)     All other components within normal limits    Narrative:     GFR Normal >60  Chronic Kidney Disease <60  Kidney Failure <15     URINALYSIS W/ CULTURE IF INDICATED - Abnormal; Notable for the following components:    Color, UA Dark Yellow (*)     Appearance, UA Cloudy (*)     Ketones, UA 15 mg/dL (1+) (*)     Protein, UA Trace (*)     All other components within normal limits    Narrative:  "    Urine microscopic not indicated.   CBC WITH AUTO DIFFERENTIAL - Abnormal; Notable for the following components:    Lymphocyte % 49.7 (*)     Eosinophil % 0.0 (*)     Lymphocytes, Absolute 3.30 (*)     All other components within normal limits   POCT GLUCOSE FINGERSTICK - Abnormal; Notable for the following components:    Glucose 69 (*)     All other components within normal limits   LIPASE - Normal   CLOSTRIDIUM DIFFICILE TOXIN    Narrative:     The following orders were created for panel order Clostridium Difficile Toxin - Stool, Per Rectum.  Procedure                               Abnormality         Status                     ---------                               -----------         ------                     Clostridium Difficile EI...[[id number]]                      In process                   Please view results for these tests on the individual orders.   GASTROINTESTINAL PANEL, PCR   CLOSTRIDIUM DIFFICILE EIA   CBC AND DIFFERENTIAL    Narrative:     The following orders were created for panel order CBC & Differential.  Procedure                               Abnormality         Status                     ---------                               -----------         ------                     CBC Auto Differential[[id number]]        Abnormal            Final result                 Please view results for these tests on the individual orders.     Medications   sodium chloride 0.9 % flush 10 mL (10 mL Intravenous Given 3/7/21 1718)   sodium chloride 0.9 % bolus 1,000 mL (0 mL Intravenous Stopped 3/7/21 1612)   ondansetron (ZOFRAN) injection 4 mg (4 mg Intravenous Given 3/7/21 1455)   ondansetron (ZOFRAN) injection 4 mg (4 mg Intravenous Given 3/7/21 1715)     No radiology results for the last day    ED Course as of Mar 07 1733   Sun Mar 07, 2021   1653 Patient recently produce a stool sample sending for C. difficile waiting result.    [KJ]      ED Course User Index  [KJ] Sandra Nunn, APRN                                            MDM     I examined the patient using the appropriate personal protective equipment.      DISPOSITION:   Chart Review: 3/4/2021 urine culture positive E. coli, susceptible to Cipro which patient is currently taking.  Comorbidity:  has a past medical history of Anxiety, Bipolar 2 disorder (CMS/[facility]), Diabetes mellitus (CMS/[facility]), Diabetes mellitus type I (CMS/[facility]), Disease of thyroid gland, DKA (diabetic ketoacidoses) (CMS/[facility]), High cholesterol, Kidney stone, and Pelvic pain.    Labs: CBC WBC 6.7 H&H 12.8 36.0 platelets 429.  CMP glucose 112 otherwise unremarkable.  Lipase 15.  Urinalysis cloudy appearance negative blood negative leukocytes negative nitrites.    No radiology results for the last day    Disposition/Treatment:    IV established 1 L normal saline and Zofran given in ER.  No vomiting while in ER.  Blood work is fairly unremarkable there was no white count and H&H was normal electrolytes were normal.  Glucose 112.  Urinalysis was negative for any blood leukocytes and nitrites, UTI seems to have improved.  Do not currently have GI panel PCR from stool or C. difficile results at this time.  Positive results will be called to patient otherwise she may continue to take Cipro as prescribed and follow-up with her primary care doctor.    prescription: Zofran      Final diagnoses:   Diarrhea, unspecified type            Sandra Nunn, APRN  03/07/21 4199

## 2021-03-08 LAB — C DIFF GDH STL QL: NEGATIVE

## 2021-03-10 ENCOUNTER — TELEPHONE (OUTPATIENT)
Dept: ENDOCRINOLOGY | Facility: CLINIC | Age: 51
End: 2021-03-10

## 2021-05-24 ENCOUNTER — TELEPHONE (OUTPATIENT)
Dept: ENDOCRINOLOGY | Facility: CLINIC | Age: 51
End: 2021-05-24

## 2021-06-10 ENCOUNTER — TELEPHONE (OUTPATIENT)
Dept: ENDOCRINOLOGY | Facility: CLINIC | Age: 51
End: 2021-06-10

## 2021-06-10 NOTE — TELEPHONE ENCOUNTER
Pt called stating she has to use a different DME for her pump supplies-not [employer] but she could not give me a name.  Emailed [employer] asking which DME she can use with her [employer] by [employer].  Waiting for reply

## 2021-06-11 NOTE — TELEPHONE ENCOUNTER
Maribeth, [employer] rep, emailed back that pt needs to get her pump supplies from [facility] with her [employer] insurance.

## 2021-06-15 NOTE — TELEPHONE ENCOUNTER
Attempted to call pt cell # but had to leave a VM to call back Concepcion. Also tried to call home phone but was not able to reach her either.

## 2021-06-15 NOTE — TELEPHONE ENCOUNTER
PO for pump on MD desk ready for signature and then will be faxed to [facility] with last 2 OV's.

## 2021-06-24 ENCOUNTER — TELEPHONE (OUTPATIENT)
Dept: ENDOCRINOLOGY | Facility: CLINIC | Age: 51
End: 2021-06-24

## 2021-06-29 ENCOUNTER — TELEPHONE (OUTPATIENT)
Dept: ENDOCRINOLOGY | Facility: CLINIC | Age: 51
End: 2021-06-29

## 2021-07-16 RX ORDER — IBUPROFEN 800 MG/1
TABLET ORAL
COMMUNITY
Start: 2021-05-21 | End: 2021-07-27

## 2021-07-16 RX ORDER — HYDROCODONE BITARTRATE AND ACETAMINOPHEN 5; 325 MG/1; MG/1
TABLET ORAL
COMMUNITY
Start: 2021-05-05 | End: 2021-07-27

## 2021-07-16 RX ORDER — OXYCODONE AND ACETAMINOPHEN 10; 325 MG/1; MG/1
1 TABLET ORAL 3 TIMES DAILY
COMMUNITY
Start: 2021-05-07 | End: 2021-07-27

## 2021-07-21 ENCOUNTER — LAB (OUTPATIENT)
Dept: LAB | Facility: HOSPITAL | Age: 51
End: 2021-07-21

## 2021-07-21 DIAGNOSIS — E10.40 TYPE 1 DIABETES MELLITUS WITH DIABETIC NEUROPATHY (HCC): ICD-10-CM

## 2021-07-21 DIAGNOSIS — E55.9 VITAMIN D DEFICIENCY: ICD-10-CM

## 2021-07-21 LAB
25(OH)D3 SERPL-MCNC: 19.4 NG/ML (ref 30–100)
HBA1C MFR BLD: 9.1 % (ref 3.5–5.6)

## 2021-07-21 PROCEDURE — 36415 COLL VENOUS BLD VENIPUNCTURE: CPT

## 2021-07-21 PROCEDURE — 83036 HEMOGLOBIN GLYCOSYLATED A1C: CPT

## 2021-07-21 PROCEDURE — 82306 VITAMIN D 25 HYDROXY: CPT

## 2021-07-27 ENCOUNTER — OFFICE VISIT (OUTPATIENT)
Dept: ENDOCRINOLOGY | Facility: CLINIC | Age: 51
End: 2021-07-27

## 2021-07-27 ENCOUNTER — LAB (OUTPATIENT)
Dept: LAB | Facility: HOSPITAL | Age: 51
End: 2021-07-27

## 2021-07-27 ENCOUNTER — TELEPHONE (OUTPATIENT)
Dept: ENDOCRINOLOGY | Facility: CLINIC | Age: 51
End: 2021-07-27

## 2021-07-27 VITALS
SYSTOLIC BLOOD PRESSURE: 104 MMHG | HEART RATE: 90 BPM | BODY MASS INDEX: 24.29 KG/M2 | HEIGHT: 69 IN | WEIGHT: 164 LBS | DIASTOLIC BLOOD PRESSURE: 70 MMHG | TEMPERATURE: 97.2 F

## 2021-07-27 DIAGNOSIS — Z96.41 INSULIN PUMP STATUS: ICD-10-CM

## 2021-07-27 DIAGNOSIS — E10.40 TYPE 1 DIABETES MELLITUS WITH DIABETIC NEUROPATHY (HCC): Primary | ICD-10-CM

## 2021-07-27 DIAGNOSIS — E78.2 HYPERLIPIDEMIA, MIXED: ICD-10-CM

## 2021-07-27 DIAGNOSIS — E55.9 VITAMIN D DEFICIENCY: ICD-10-CM

## 2021-07-27 DIAGNOSIS — E04.9 GOITER: ICD-10-CM

## 2021-07-27 LAB
GLUCOSE BLDC GLUCOMTR-MCNC: 274 MG/DL (ref 70–105)
T3FREE SERPL-MCNC: 3.08 PG/ML (ref 2–4.4)
T4 FREE SERPL-MCNC: 1.3 NG/DL (ref 0.93–1.7)
TSH SERPL DL<=0.05 MIU/L-ACNC: 2.08 UIU/ML (ref 0.27–4.2)

## 2021-07-27 PROCEDURE — 99214 OFFICE O/P EST MOD 30 MIN: CPT | Performed by: INTERNAL MEDICINE

## 2021-07-27 PROCEDURE — 84443 ASSAY THYROID STIM HORMONE: CPT | Performed by: INTERNAL MEDICINE

## 2021-07-27 PROCEDURE — 84481 FREE ASSAY (FT-3): CPT | Performed by: INTERNAL MEDICINE

## 2021-07-27 PROCEDURE — 36415 COLL VENOUS BLD VENIPUNCTURE: CPT | Performed by: INTERNAL MEDICINE

## 2021-07-27 PROCEDURE — 84439 ASSAY OF FREE THYROXINE: CPT | Performed by: INTERNAL MEDICINE

## 2021-07-27 PROCEDURE — 82962 GLUCOSE BLOOD TEST: CPT | Performed by: INTERNAL MEDICINE

## 2021-07-27 PROCEDURE — 86376 MICROSOMAL ANTIBODY EACH: CPT | Performed by: INTERNAL MEDICINE

## 2021-07-27 RX ORDER — ERGOCALCIFEROL 1.25 MG/1
50000 CAPSULE ORAL WEEKLY
Qty: 12 CAPSULE | Refills: 3 | Status: SHIPPED | OUTPATIENT
Start: 2021-07-27 | End: 2022-08-03 | Stop reason: SDUPTHER

## 2021-07-28 LAB — THYROPEROXIDASE AB SERPL-ACNC: <8 IU/ML (ref 0–34)

## 2021-08-03 NOTE — PROGRESS NOTES
[facility]        Patient Care Team:  Provider, No Known as PCP - General    Chief Complaint:    Chief Complaint   Patient presents with   • Diabetes     Type 1,  fasting, Basal 12am .8, 3am .7, 9:30am .9, 4pm .8, 9pm .8, Ketones Trace         Subjective   Here for diabetes f/u  Blood sugars: higher in am  Exercise program: none  Not taking vit D    Interval History:     Patient Complaints: feet hurting  Patient Denies:  hypoglycemia  History taken from: patient    Review of Systems:   Review of Systems   HENT: Negative for trouble swallowing.    Eyes: Negative for blurred vision.   Gastrointestinal: Negative for nausea.   Endocrine: Negative for polyuria.   Musculoskeletal:        Feet hurting   Neurological: Negative for headache.   Psychiatric/Behavioral: Positive for sleep disturbance.     Gained 4 lb since last visit    Objective     Vital Signs      Vitals:    07/27/21 0826   BP: 104/70   Pulse: 90   Temp: 97.2 °F (36.2 °C)         Physical Exam:     General Appearance:    Alert, cooperative, in no acute distress   Head:    Normocephalic, without obvious abnormality, atraumatic   Eyes:            Lids and lashes normal, conjunctivae and sclerae normal, no   icterus, no pallor, corneas clear, PERRLA   Throat:   No oral lesions,  oral mucosa moist. Tongue stud   Neck:   36 cm in circumference, thyroid firm, no   carotid bruit   Lungs:     Clear    Heart:    Regular rhythm and normal rate   Chest Wall:    No abnormalities observed   Abdomen:     Normal bowel sounds, soft                 Extremities:   Plantar calluses, no edema               Pulses:   Pulses palpable and equal bilaterally   Skin:   Pump site clean   Neurologic:  DTR absent in ankles, vibratory sense 50% decreased in toes, able to feel the 10g monofilament ( same as 1y ago )            Results Review:    I have reviewed the patient's new clinical results, labs & imaging.    Medication Review:   Prior to Admission  "medications    Medication Sig Start Date End Date Taking? Authorizing Provider   atorvastatin (LIPITOR) 40 MG tablet Take 40 mg by mouth Daily.   Yes Daisy Cullen MD   clonazePAM (KlonoPIN) 1 MG tablet Take 1 mg by mouth 2 (Two) Times a Day As Needed for Anxiety.   Yes Daisy Cullen MD   insulin aspart (novoLOG) 100 UNIT/ML injection Inject per sliding scale max daily dose 80 units 1/14/20  Yes Nai Patton MD   Insulin Glargine (BASAGLAR KWIKPEN) 100 UNIT/ML injection pen INJECT 22 UNITS SUB Q WHEN OFF THE PUMP  2/1/21  Yes Nai Patton MD   Insulin Syringe-Needle U-100 30G X 5/16\" 0.5 ML misc Use for insulin injections. 3x/d. 7/13/20  Yes Nai Patton MD   lamoTRIgine (LAMICTAL) 200 MG tablet Take 200 mg by mouth Every Night.   Yes Daisy Cullen MD   omeprazole (priLOSEC) 40 MG capsule  1/14/21  Yes Daisy Cullen MD   ondansetron (ZOFRAN) 4 MG tablet Take 1 tablet by mouth Every 6 (Six) Hours As Needed for Nausea or Vomiting. 3/7/21  Yes Sandra Nunn APRN   sertraline (ZOLOFT) 100 MG tablet 200 mg. 1/2/21  Yes Provider, Historical, MD   UNIFINE PENTIPS 32G X 4 MM misc USE WITH INSULIN PENS 4 TIMES A DAY  6/10/20  Yes Nai Patton MD   zolpidem (AMBIEN) 10 MG tablet Take 20 mg by mouth At Night As Needed.   Yes Daisy Cullen MD   vitamin D (ERGOCALCIFEROL) 1.25 MG (17762 UT) capsule capsule Take 1 capsule by mouth 1 (One) Time Per Week. 7/27/21   Nai Patton MD       Lab Results (most recent)     Procedure Component Value Units Date/Time    Thyroid Peroxidase Antibody [[id number]] Collected: 07/27/21 0913    Specimen: Blood Updated: 07/28/21 0410     Thyroid Peroxidase Antibody <8 IU/mL     Narrative:      Performed at:  [address] [place] [address]  [id number]  : Scott Banda PhD, Phone:  [phone]    T4, Free [[id number]]  (Normal) Collected: 07/27/21 0913    Specimen: Blood Updated: 07/27/21 1713     " Free T4 1.30 ng/dL     Narrative:      Results may be falsely increased if patient taking Biotin.      T3, Free [[id number]]  (Normal) Collected: 07/27/21 0913    Specimen: Blood Updated: 07/27/21 1713     T3, Free 3.08 pg/mL     Narrative:      Results may be falsely increased if patient taking Biotin.      TSH [[id number]]  (Normal) Collected: 07/27/21 0913    Specimen: Blood Updated: 07/27/21 1713     TSH 2.080 uIU/mL     POC Glucose [[id number]]  (Abnormal) Collected: 07/27/21 0831    Specimen: Blood Updated: 07/27/21 0832     Glucose 274 mg/dL      Comment: Serial Number: [id number]:  [id number]           Lab Results   Component Value Date    HGBA1C 9.1 (H) 07/21/2021    HGBA1C 10.1 (H) 01/15/2021    HGBA1C 13.0 (H) 01/07/2020      Lab Results   Component Value Date    GLUCOSE 112 (H) 03/07/2021    BUN 9 03/07/2021    CREATININE 0.88 03/07/2021    EGFRIFNONA 68 03/07/2021    BCR 10.2 03/07/2021    K 4.7 03/07/2021    CO2 26.0 03/07/2021    CALCIUM 9.6 03/07/2021    ALBUMIN 3.80 03/07/2021    LABIL2 1.1 11/05/2018    AST 17 03/07/2021    ALT 11 03/07/2021    CHOL 183 01/15/2021    LDL 96 01/15/2021    HDL 51 01/15/2021    TRIG 212 (H) 01/15/2021     Lab Results   Component Value Date    TSH 2.080 07/27/2021    FREET4 1.30 07/27/2021    HFIN46JH 19.4 (L) 07/21/2021       Assessment/Plan     Diagnoses and all orders for this visit:    1. Type 1 diabetes mellitus with diabetic neuropathy (CMS/HCC) (Primary) - improved    2. Hyperlipidemia, mixed - will check status next visit    3. Goiter - stable, euthyroid  -     Thyroid Peroxidase Antibody  -     TSH  -     T4, Free  -     T3, Free    4. Vitamin D deficiency - worse  -     vitamin D (ERGOCALCIFEROL) 1.25 MG (74054 UT) capsule capsule; Take 1 capsule by mouth 1 (One) Time Per Week.  Dispense: 12 capsule; Refill: 3    5. Insulin pump status    Other orders  -     POC Glucose    Wearing a 630G MiniMed pump with sensors since January 2021.    Take vit D 50,000  units once a week.  Increase basal rate to 0.9 units/h from 12a-3a.  Continue atorvastatin.  Call if blood sugars are running under 100 or over 200.        Nai Patton MD  08/02/21  21:36 EDT

## 2021-08-30 RX ORDER — ATORVASTATIN CALCIUM 40 MG/1
TABLET, FILM COATED ORAL
Qty: 90 TABLET | Refills: 4 | Status: SHIPPED | OUTPATIENT
Start: 2021-08-30

## 2021-10-27 RX ORDER — ONDANSETRON 4 MG/1
TABLET, ORALLY DISINTEGRATING ORAL
Qty: 12 TABLET | Refills: 4 | OUTPATIENT
Start: 2021-10-27

## 2021-10-29 ENCOUNTER — OFFICE (AMBULATORY)
Dept: URBAN - METROPOLITAN AREA CLINIC 64 | Facility: CLINIC | Age: 51
End: 2021-10-29

## 2021-10-29 VITALS
HEART RATE: 107 BPM | DIASTOLIC BLOOD PRESSURE: 80 MMHG | WEIGHT: 162 LBS | SYSTOLIC BLOOD PRESSURE: 117 MMHG | HEIGHT: 69 IN

## 2021-10-29 DIAGNOSIS — K59.00 CONSTIPATION, UNSPECIFIED: ICD-10-CM

## 2021-10-29 DIAGNOSIS — K31.84 GASTROPARESIS: ICD-10-CM

## 2021-10-29 DIAGNOSIS — R11.2 NAUSEA WITH VOMITING, UNSPECIFIED: ICD-10-CM

## 2021-10-29 PROCEDURE — 99214 OFFICE O/P EST MOD 30 MIN: CPT | Performed by: NURSE PRACTITIONER

## 2021-10-29 RX ORDER — PLECANATIDE 3 MG/1
3 TABLET ORAL
Qty: 90 | Refills: 3 | Status: COMPLETED
Start: 2021-10-29 | End: 2023-06-15

## 2021-10-29 RX ORDER — ESOMEPRAZOLE MAGNESIUM 40 MG/1
40 CAPSULE, DELAYED RELEASE ORAL
Qty: 90 | Refills: 3 | Status: ACTIVE
Start: 2021-10-29

## 2021-10-29 RX ORDER — SUCRALFATE 1 G/1
TABLET ORAL
Qty: 60 | Refills: 1 | Status: COMPLETED
Start: 2021-10-29 | End: 2023-06-15

## 2022-01-19 ENCOUNTER — LAB (OUTPATIENT)
Dept: LAB | Facility: HOSPITAL | Age: 52
End: 2022-01-19

## 2022-01-19 DIAGNOSIS — E78.2 HYPERLIPIDEMIA, MIXED: ICD-10-CM

## 2022-01-19 DIAGNOSIS — E04.9 GOITER: ICD-10-CM

## 2022-01-19 DIAGNOSIS — E55.9 VITAMIN D DEFICIENCY: ICD-10-CM

## 2022-01-19 DIAGNOSIS — E10.40 TYPE 1 DIABETES MELLITUS WITH DIABETIC NEUROPATHY: ICD-10-CM

## 2022-01-19 LAB
25(OH)D3 SERPL-MCNC: 27.5 NG/ML
ALBUMIN SERPL-MCNC: 4 G/DL (ref 3.5–5.2)
ALBUMIN UR-MCNC: 2 MG/DL
ALBUMIN/GLOB SERPL: 1.3 G/DL
ALP SERPL-CCNC: 142 U/L (ref 39–117)
ALT SERPL W P-5'-P-CCNC: 11 U/L (ref 1–33)
ANION GAP SERPL CALCULATED.3IONS-SCNC: 9.8 MMOL/L (ref 5–15)
AST SERPL-CCNC: 16 U/L (ref 1–32)
BILIRUB SERPL-MCNC: 0.2 MG/DL (ref 0–1.2)
BUN SERPL-MCNC: 10 MG/DL (ref 6–20)
BUN/CREAT SERPL: 13.5 (ref 7–25)
CALCIUM SPEC-SCNC: 9.6 MG/DL (ref 8.6–10.5)
CHLORIDE SERPL-SCNC: 101 MMOL/L (ref 98–107)
CHOLEST SERPL-MCNC: 153 MG/DL (ref 0–200)
CO2 SERPL-SCNC: 27.2 MMOL/L (ref 22–29)
CREAT SERPL-MCNC: 0.74 MG/DL (ref 0.57–1)
CREAT UR-MCNC: 267.9 MG/DL
GFR SERPL CREATININE-BSD FRML MDRD: 83 ML/MIN/1.73
GLOBULIN UR ELPH-MCNC: 3 GM/DL
GLUCOSE SERPL-MCNC: 260 MG/DL (ref 65–99)
HBA1C MFR BLD: 8.3 % (ref 3.5–5.6)
HDLC SERPL-MCNC: 54 MG/DL (ref 40–60)
LDLC SERPL CALC-MCNC: 73 MG/DL (ref 0–100)
LDLC/HDLC SERPL: 1.27 {RATIO}
MICROALBUMIN/CREAT UR: 7.5 MG/G
POTASSIUM SERPL-SCNC: 4.3 MMOL/L (ref 3.5–5.2)
PROT SERPL-MCNC: 7 G/DL (ref 6–8.5)
SODIUM SERPL-SCNC: 138 MMOL/L (ref 136–145)
TRIGL SERPL-MCNC: 153 MG/DL (ref 0–150)
TSH SERPL DL<=0.05 MIU/L-ACNC: 2.97 UIU/ML (ref 0.27–4.2)
VLDLC SERPL-MCNC: 26 MG/DL (ref 5–40)

## 2022-01-19 PROCEDURE — 36415 COLL VENOUS BLD VENIPUNCTURE: CPT

## 2022-01-19 PROCEDURE — 80061 LIPID PANEL: CPT

## 2022-01-19 PROCEDURE — 82043 UR ALBUMIN QUANTITATIVE: CPT

## 2022-01-19 PROCEDURE — 82570 ASSAY OF URINE CREATININE: CPT

## 2022-01-19 PROCEDURE — 80053 COMPREHEN METABOLIC PANEL: CPT

## 2022-01-19 PROCEDURE — 84443 ASSAY THYROID STIM HORMONE: CPT

## 2022-01-19 PROCEDURE — 82306 VITAMIN D 25 HYDROXY: CPT

## 2022-01-19 PROCEDURE — 83036 HEMOGLOBIN GLYCOSYLATED A1C: CPT

## 2022-01-21 RX ORDER — SUCRALFATE 1 G/1
TABLET ORAL
COMMUNITY
Start: 2021-10-29 | End: 2022-01-26

## 2022-01-21 RX ORDER — INSULIN LISPRO 100 [IU]/ML
INJECTION, SOLUTION INTRAVENOUS; SUBCUTANEOUS
COMMUNITY
End: 2022-01-26

## 2022-01-21 RX ORDER — METHOCARBAMOL 750 MG/1
TABLET, FILM COATED ORAL
COMMUNITY
Start: 2021-11-05 | End: 2022-01-26

## 2022-01-21 RX ORDER — LAMOTRIGINE 50 MG/1
TABLET, ORALLY DISINTEGRATING ORAL
COMMUNITY
Start: 2021-11-29 | End: 2022-01-26

## 2022-01-21 RX ORDER — GABAPENTIN 300 MG/1
CAPSULE ORAL
COMMUNITY
Start: 2021-12-28

## 2022-01-21 RX ORDER — ONDANSETRON 4 MG/1
TABLET, ORALLY DISINTEGRATING ORAL
COMMUNITY
Start: 2022-01-19

## 2022-01-21 RX ORDER — LAMOTRIGINE 25 MG/1
TABLET, ORALLY DISINTEGRATING ORAL
COMMUNITY
Start: 2021-11-29 | End: 2022-01-26

## 2022-01-21 RX ORDER — GABAPENTIN 250 MG/5ML
SOLUTION ORAL
COMMUNITY
Start: 2021-12-20 | End: 2022-01-26

## 2022-01-26 ENCOUNTER — OFFICE VISIT (OUTPATIENT)
Dept: ENDOCRINOLOGY | Facility: CLINIC | Age: 52
End: 2022-01-26

## 2022-01-26 VITALS
WEIGHT: 157 LBS | DIASTOLIC BLOOD PRESSURE: 80 MMHG | SYSTOLIC BLOOD PRESSURE: 100 MMHG | HEART RATE: 99 BPM | HEIGHT: 69 IN | BODY MASS INDEX: 23.25 KG/M2 | TEMPERATURE: 97.3 F

## 2022-01-26 DIAGNOSIS — E78.2 HYPERLIPIDEMIA, MIXED: ICD-10-CM

## 2022-01-26 DIAGNOSIS — E55.9 VITAMIN D DEFICIENCY: ICD-10-CM

## 2022-01-26 DIAGNOSIS — E03.8 HYPOTHYROIDISM DUE TO HASHIMOTO'S THYROIDITIS: ICD-10-CM

## 2022-01-26 DIAGNOSIS — E06.3 HYPOTHYROIDISM DUE TO HASHIMOTO'S THYROIDITIS: ICD-10-CM

## 2022-01-26 DIAGNOSIS — E10.40 TYPE 1 DIABETES MELLITUS WITH DIABETIC NEUROPATHY: Primary | ICD-10-CM

## 2022-01-26 DIAGNOSIS — Z96.41 INSULIN PUMP STATUS: ICD-10-CM

## 2022-01-26 LAB — GLUCOSE BLDC GLUCOMTR-MCNC: 91 MG/DL (ref 70–105)

## 2022-01-26 PROCEDURE — 99214 OFFICE O/P EST MOD 30 MIN: CPT | Performed by: INTERNAL MEDICINE

## 2022-01-26 PROCEDURE — 82962 GLUCOSE BLOOD TEST: CPT | Performed by: INTERNAL MEDICINE

## 2022-01-26 RX ORDER — LAMOTRIGINE 200 MG/1
TABLET, ORALLY DISINTEGRATING ORAL
COMMUNITY
Start: 2022-01-25 | End: 2022-01-26

## 2022-01-26 NOTE — PROGRESS NOTES
[facility]        Patient Care Team:  Provider, No Known as PCP - General    Chief Complaint:    Chief Complaint   Patient presents with   • Diabetes     follow up, Type 1, BS 91, last ate 3 days ago, Basal 12am .9, 3am .7, 9:30am .9, 4pm .8         Subjective   Here for diabetes f/u  Blood sugars: low at noon. Not wearing sensors  Exercise program: none  Taking vit D    Interval History:     Patient Complaints: to get gastric stimulator for gastroparesis soon @ [facility]  Patient Denies:  Severe hypoglycemia  History taken from: patient    Review of Systems:   Review of Systems   Constitutional: Positive for appetite change and unexpected weight loss.   HENT: Negative for trouble swallowing.    Eyes: Negative for blurred vision.   Gastrointestinal: Negative for nausea.   Endocrine: Negative for polyuria.   Skin:        Hot flashes   Neurological: Negative for headache.     Lost  7 lb since last visit    Objective     Vital Signs  Temp:  [97.3 °F (36.3 °C)] 97.3 °F (36.3 °C)  Heart Rate:  [99] 99  BP: (100)/(80) 100/80    Physical Exam:     General Appearance:    Alert, cooperative, in no acute distress   Head:    Normocephalic, without obvious abnormality, atraumatic   Eyes:            Lids and lashes normal, conjunctivae and sclerae normal, no   icterus, no pallor, corneas clear, PERRLA   Throat:   No oral lesions,  oral mucosa moist. Tongue stud   Neck:   36 cm in circumference, thyroid firm, no   carotid bruit   Lungs:     Clear    Heart:    Regular rhythm and normal rate   Chest Wall:    No abnormalities observed   Abdomen:     Normal bowel sounds, soft                 Extremities:   Moves all extremities well, no edema               Pulses:   Pulses palpable and equal bilaterally   Skin:   Pump site clean   Neurologic:  DTR absent, able to feel the 10g monofilament          Results Review:    I have reviewed the patient's new clinical results, labs & imaging.    Medication Review:   Prior  "to Admission medications    Medication Sig Start Date End Date Taking? Authorizing Provider   atorvastatin (LIPITOR) 40 MG tablet TAKE 1 TABLET BY MOUTH EVERY DAY  8/30/21  Yes Nai Patton MD   Cholecalciferol 250 MCG (52609 UT) tablet Take  by mouth.   Yes Daisy Cullen MD   clonazePAM (KlonoPIN) 1 MG tablet Take 1 mg by mouth 2 (Two) Times a Day As Needed for Anxiety.   Yes Daisy Cullen MD   gabapentin (NEURONTIN) 300 MG capsule TAKE 1 CAPSULE BY MOUTH THREE TIMES A DAY, max 3 per day 12/28/21  Yes Daisy Cullen MD   Insulin Glargine (BASAGLAR KWIKPEN) 100 UNIT/ML injection pen INJECT 22 UNITS SUB Q WHEN OFF THE PUMP  2/1/21  Yes Nai Patton MD   Insulin Syringe-Needle U-100 30G X 5/16\" 0.5 ML misc Use for insulin injections. 3x/d. 7/13/20  Yes Nai Patton MD   lamoTRIgine (LAMICTAL) 200 MG tablet Take 200 mg by mouth Every Night.   Yes Daisy Cullen MD   NovoLOG 100 UNIT/ML injection Use with the insulin pump. MAX DAILY DOSE 100 UNITS 1/26/22  Yes Nai Patton MD   omeprazole (priLOSEC) 40 MG capsule  1/14/21  Yes Daisy Cullen MD   sertraline (ZOLOFT) 100 MG tablet 200 mg. 1/2/21  Yes Daisy Cullen MD   UNIFINE PENTIPS 32G X 4 MM misc USE WITH INSULIN PENS 4 TIMES A DAY  6/10/20  Yes Nai Patton MD   zolpidem (AMBIEN) 10 MG tablet Take 20 mg by mouth At Night As Needed.   Yes Daisy Cullen MD   NovoLOG 100 UNIT/ML injection INJECT PER SLIDING SCALE MAX DAILY DOSE 80 UNITS  Patient taking differently: FOR USE IN INSULIN PUMP, MAX DAILY DOSE 80 UNITS 8/30/21 1/26/22 Yes Nai Patton MD   ondansetron ODT (ZOFRAN-ODT) 4 MG disintegrating tablet  1/19/22   Daisy Cullen MD   vitamin D (ERGOCALCIFEROL) 1.25 MG (32894 UT) capsule capsule Take 1 capsule by mouth 1 (One) Time Per Week. 7/27/21   Nai Patton MD   gabapentin (NEURONTIN) 50 mg/mL solution TAKE 2 MLS BY MOUTH THREE TIMES A DAY. MAX DAILY AMOUNT 6 " MLS 12/20/21 1/26/22  Daisy Cullen MD   Insulin Lispro, 1 Unit Dial, (HumaLOG KwikPen) 100 UNIT/ML solution pen-injector Inject  under the skin into the appropriate area as directed.  1/26/22  Daisy Cullen MD   lamoTRIgine (LaMICtal) 200 MG tablet dispersible disintegrating tablet  1/25/22 1/26/22  Daisy Cullen MD   lamoTRIgine (LaMICtal) 25 MG disintegrating tablet  11/29/21 1/26/22  Daisy Cullen MD   lamoTRIgine (LaMICtal) 50 MG tablet dispersible disintegrating tablet  11/29/21 1/26/22  Daisy Cullen MD   methocarbamol (ROBAXIN) 750 MG tablet  11/5/21 1/26/22  Daisy Cullen MD   ondansetron (ZOFRAN) 4 MG tablet Take 1 tablet by mouth Every 6 (Six) Hours As Needed for Nausea or Vomiting. 3/7/21 1/26/22  Sandra Nunn APRN   sucralfate (CARAFATE) 1 g tablet  10/29/21 1/26/22  Daisy Cullen MD       Lab Results (most recent)     Procedure Component Value Units Date/Time    POC Glucose [[id number]]  (Normal) Collected: 01/26/22 1219    Specimen: Blood Updated: 01/26/22 1220     Glucose 91 mg/dL      Comment: Serial Number: [id number]:  [id number]               Lab Results   Component Value Date    HGBA1C 8.3 (H) 01/19/2022    HGBA1C 9.1 (H) 07/21/2021    HGBA1C 10.1 (H) 01/15/2021      Lab Results   Component Value Date    GLUCOSE 260 (H) 01/19/2022    BUN 10 01/19/2022    CREATININE 0.74 01/19/2022    EGFRIFNONA 83 01/19/2022    BCR 13.5 01/19/2022    K 4.3 01/19/2022    CO2 27.2 01/19/2022    CALCIUM 9.6 01/19/2022    ALBUMIN 4.00 01/19/2022    LABIL2 1.1 11/05/2018    AST 16 01/19/2022    ALT 11 01/19/2022    CHOL 153 01/19/2022    LDL 73 01/19/2022    HDL 54 01/19/2022    TRIG 153 (H) 01/19/2022     Lab Results   Component Value Date    TSH 2.970 01/19/2022    FREET4 1.30 07/27/2021    OWSP61VB 27.5 01/19/2022       Assessment/Plan     Diagnoses and all orders for this visit:    1. Type 1 diabetes mellitus with diabetic neuropathy (HCC)  (Primary) - improved  -     NovoLOG 100 UNIT/ML injection; Use with the insulin pump. MAX DAILY DOSE 100 UNITS  Dispense: 30 mL; Refill: 11  -     Hemoglobin A1c; Future  -     Ambulatory Referral to Diabetic Education    2. Hypothyroidism due to Hashimoto's thyroiditis - stable ( on no meds )    3. Hyperlipidemia, mixed - stable  -     Comprehensive Metabolic Panel; Future    4. Vitamin D deficiency - improved    5. Insulin pump status    Other orders  -     POC Glucose    Wearing a 630G MiniMed pump replaced in January 2021. Out of warranty x2y.     Increase exercise as able.  Decrease basal rate to 0.85 units/h from 9:30a-4p.  Continue atorvastatin & vit D supplements.  Call if blood sugars are running under 100 or over 200.  [employer] pump preview.          Nai Patton MD  01/26/22  18:37 EST

## 2022-01-26 NOTE — PATIENT INSTRUCTIONS
Keep up the good work!  Increase exercise as able.  Decrease basal rate to 0.85 units/h from 9:30a-4p.  Continue atorvastatin & vit D supplements.  Call if blood sugars are running under 100 or over 200.  F/u in 6 months, with labs prior.   [employer] pump preview.

## 2022-03-01 ENCOUNTER — TELEPHONE (OUTPATIENT)
Dept: ENDOCRINOLOGY | Facility: CLINIC | Age: 52
End: 2022-03-01

## 2022-05-16 ENCOUNTER — TELEPHONE (OUTPATIENT)
Dept: ENDOCRINOLOGY | Facility: CLINIC | Age: 52
End: 2022-05-16

## 2022-06-24 PROBLEM — H35.3132 INTERMEDIATE STAGE NONEXUDATIVE AGE-RELATED MACULAR DEGENERATION OF BOTH EYES: Status: ACTIVE | Noted: 2022-06-17

## 2022-06-24 PROBLEM — H25.13 NUCLEAR SCLEROTIC CATARACT OF BOTH EYES: Status: ACTIVE | Noted: 2022-06-17

## 2022-07-20 ENCOUNTER — TELEPHONE (OUTPATIENT)
Dept: ENDOCRINOLOGY | Facility: CLINIC | Age: 52
End: 2022-07-20

## 2022-07-28 ENCOUNTER — LAB (OUTPATIENT)
Dept: LAB | Facility: HOSPITAL | Age: 52
End: 2022-07-28

## 2022-07-28 ENCOUNTER — OFFICE VISIT (OUTPATIENT)
Dept: ENDOCRINOLOGY | Facility: CLINIC | Age: 52
End: 2022-07-28

## 2022-07-28 DIAGNOSIS — E10.40 TYPE 1 DIABETES MELLITUS WITH DIABETIC NEUROPATHY: ICD-10-CM

## 2022-07-28 DIAGNOSIS — E10.65 TYPE 1 DIABETES MELLITUS WITH HYPERGLYCEMIA: ICD-10-CM

## 2022-07-28 DIAGNOSIS — E78.2 HYPERLIPIDEMIA, MIXED: ICD-10-CM

## 2022-07-28 LAB
ALBUMIN SERPL-MCNC: 4.3 G/DL (ref 3.5–5.2)
ALBUMIN/GLOB SERPL: 1.5 G/DL
ALP SERPL-CCNC: 87 U/L (ref 39–117)
ALT SERPL W P-5'-P-CCNC: 13 U/L (ref 1–33)
ANION GAP SERPL CALCULATED.3IONS-SCNC: 10.3 MMOL/L (ref 5–15)
AST SERPL-CCNC: 17 U/L (ref 1–32)
BILIRUB SERPL-MCNC: 0.3 MG/DL (ref 0–1.2)
BUN SERPL-MCNC: 12 MG/DL (ref 6–20)
BUN/CREAT SERPL: 14.1 (ref 7–25)
CALCIUM SPEC-SCNC: 9.5 MG/DL (ref 8.6–10.5)
CHLORIDE SERPL-SCNC: 101 MMOL/L (ref 98–107)
CO2 SERPL-SCNC: 23.7 MMOL/L (ref 22–29)
CREAT SERPL-MCNC: 0.85 MG/DL (ref 0.57–1)
EGFRCR SERPLBLD CKD-EPI 2021: 83.1 ML/MIN/1.73
GLOBULIN UR ELPH-MCNC: 2.8 GM/DL
GLUCOSE SERPL-MCNC: 333 MG/DL (ref 65–99)
HBA1C MFR BLD: 8.2 % (ref 3.5–5.6)
POTASSIUM SERPL-SCNC: 4 MMOL/L (ref 3.5–5.2)
PROT SERPL-MCNC: 7.1 G/DL (ref 6–8.5)
SODIUM SERPL-SCNC: 135 MMOL/L (ref 136–145)

## 2022-07-28 PROCEDURE — 83036 HEMOGLOBIN GLYCOSYLATED A1C: CPT

## 2022-07-28 PROCEDURE — 36415 COLL VENOUS BLD VENIPUNCTURE: CPT

## 2022-07-28 PROCEDURE — 80053 COMPREHEN METABOLIC PANEL: CPT

## 2022-07-28 NOTE — PROGRESS NOTES
Will not be putting in any charges for today's pump preview b/c pt only met with reps. Pt has decided on the [employer] pump and Dexcom. Faxed [employer] AOB with insurance info and last OV. Initiated orders for Dexcom supplies via [facility].

## 2022-07-29 RX ORDER — GABAPENTIN 300 MG/1
4 CAPSULE ORAL EVERY 12 HOURS
COMMUNITY
Start: 2022-04-18 | End: 2022-08-03 | Stop reason: SDUPTHER

## 2022-07-29 RX ORDER — ALBUTEROL SULFATE 90 UG/1
2 AEROSOL, METERED RESPIRATORY (INHALATION)
COMMUNITY
Start: 2022-03-13

## 2022-07-29 RX ORDER — FAMOTIDINE 40 MG/1
40 TABLET, FILM COATED ORAL
COMMUNITY

## 2022-07-29 RX ORDER — INSULIN ASPART 100 [IU]/ML
INJECTION, SOLUTION INTRAVENOUS; SUBCUTANEOUS
COMMUNITY
Start: 2022-07-03 | End: 2022-08-03 | Stop reason: SDUPTHER

## 2022-07-29 RX ORDER — SERTRALINE HYDROCHLORIDE 100 MG/1
200 TABLET, FILM COATED ORAL DAILY
COMMUNITY
End: 2022-08-03

## 2022-07-29 RX ORDER — SUCRALFATE 1 G/1
TABLET ORAL AS NEEDED
COMMUNITY
Start: 2022-03-29

## 2022-07-29 RX ORDER — CLONAZEPAM 1 MG/1
1 TABLET ORAL DAILY
COMMUNITY
Start: 2022-03-29 | End: 2022-08-03 | Stop reason: SDUPTHER

## 2022-08-03 ENCOUNTER — OFFICE VISIT (OUTPATIENT)
Dept: ENDOCRINOLOGY | Facility: CLINIC | Age: 52
End: 2022-08-03

## 2022-08-03 VITALS
DIASTOLIC BLOOD PRESSURE: 74 MMHG | SYSTOLIC BLOOD PRESSURE: 100 MMHG | BODY MASS INDEX: 22.66 KG/M2 | WEIGHT: 153 LBS | HEIGHT: 69 IN | HEART RATE: 94 BPM

## 2022-08-03 DIAGNOSIS — E78.2 HYPERLIPIDEMIA, MIXED: ICD-10-CM

## 2022-08-03 DIAGNOSIS — E06.3 HYPOTHYROIDISM DUE TO HASHIMOTO'S THYROIDITIS: ICD-10-CM

## 2022-08-03 DIAGNOSIS — E55.9 VITAMIN D DEFICIENCY: ICD-10-CM

## 2022-08-03 DIAGNOSIS — E03.8 HYPOTHYROIDISM DUE TO HASHIMOTO'S THYROIDITIS: ICD-10-CM

## 2022-08-03 DIAGNOSIS — E10.40 TYPE 1 DIABETES MELLITUS WITH DIABETIC NEUROPATHY: Primary | ICD-10-CM

## 2022-08-03 DIAGNOSIS — Z96.41 INSULIN PUMP STATUS: ICD-10-CM

## 2022-08-03 LAB — GLUCOSE BLDC GLUCOMTR-MCNC: 234 MG/DL (ref 70–105)

## 2022-08-03 PROCEDURE — 99214 OFFICE O/P EST MOD 30 MIN: CPT | Performed by: INTERNAL MEDICINE

## 2022-08-03 PROCEDURE — 82962 GLUCOSE BLOOD TEST: CPT | Performed by: INTERNAL MEDICINE

## 2022-08-03 RX ORDER — ERGOCALCIFEROL 1.25 MG/1
50000 CAPSULE ORAL WEEKLY
Qty: 12 CAPSULE | Refills: 3 | Status: SHIPPED | OUTPATIENT
Start: 2022-08-03

## 2022-08-03 NOTE — PATIENT INSTRUCTIONS
Keep up the good work!  Do PT exercises.  Continue same pump settings.  Restart atorvastatin & vit D once a week.  Call if blood sugars are running under 100 or over 200.  F/u in 6 months, with fasting labs prior.

## 2022-08-04 NOTE — PROGRESS NOTES
[facility]        Patient Care Team:  Provider, No Known as PCP - General    Chief Complaint:    Chief Complaint   Patient presents with   • Diabetes     Type 1, , 2 hr PP, Basal 12am .9, 3am .7, 9:30am .9, 4pm .8         Subjective   Here for diabetes f/u  Blood sugars: did not bring  Records  Attended pump preview last wk. Getting a new pump & CGMS  Exercise program: using a cane  Off atorvastatin & vit D    Interval History:     Patient Complaints: major issues w balance & nausea: autonomic neuropathy  Patient Denies: hypoglycemia  History taken from: patient    Review of Systems:   Review of Systems   Constitutional: Positive for unexpected weight loss.   Eyes: Negative for blurred vision.   Cardiovascular: Negative for palpitations.   Gastrointestinal: Positive for nausea.   Endocrine: Negative for polyuria.   Musculoskeletal: Positive for gait problem.   Neurological: Negative for tremors and headache.   Psychiatric/Behavioral: Positive for stress.     Lost  4 lb since last visit    Objective     Vital Signs  Heart Rate:  [94] 94  BP: (100)/(74) 100/74    Physical Exam:     General Appearance:    Alert, cooperative, in moderate distress   Head:    Normocephalic, without obvious abnormality, atraumatic   Eyes:            Lids and lashes normal, conjunctivae and sclerae normal, no   icterus, no pallor, corneas clear, PERRLA   Throat:   No oral lesions,  oral mucosa moist   Neck:   36 cm in circumference, thyroid firm, no carotid bruit   Lungs:     Clear     Heart:    Regular rhythm and normal rate   Chest Wall:    No abnormalities observed   Abdomen:     Normal bowel sounds, soft                 Extremities:   Plantar calluses, no edema               Pulses:   Pulses palpable and equal bilaterally   Skin:   Pump site clean   Neurologic:  DTR absent in ankles, vibratory sense 50% decreased in toes, able to feel the 10g monofilament ( same as 1y ago )            Results Review:    I  "have reviewed the patient's new clinical results, labs & imaging.    Medication Review:   Prior to Admission medications    Medication Sig Start Date End Date Taking? Authorizing Provider   albuterol sulfate  (90 Base) MCG/ACT inhaler Inhale 2 puffs. 3/13/22  Yes Daisy Cullen MD   atorvastatin (LIPITOR) 40 MG tablet TAKE 1 TABLET BY MOUTH EVERY DAY  8/30/21  Yes Nai Patton MD   clonazePAM (KlonoPIN) 1 MG tablet Take 1 mg by mouth 2 (Two) Times a Day As Needed for Anxiety.   Yes Daisy Cullen MD   famotidine (PEPCID) 40 MG tablet Take 40 mg by mouth.   Yes Daisy Cullen MD   gabapentin (NEURONTIN) 300 MG capsule TAKE 1 CAPSULE BY MOUTH THREE TIMES A DAY, max 3 per day 12/28/21  Yes Daisy Cullen MD   Insulin Glargine (BASAGLAR KWIKPEN) 100 UNIT/ML injection pen INJECT 22 UNITS SUB Q WHEN OFF THE PUMP  2/1/21  Yes Nai Patton MD   Insulin Syringe-Needle U-100 30G X 5/16\" 0.5 ML misc Use for insulin injections. 3x/d. 7/13/20  Yes Nai Patton MD   NovoLOG 100 UNIT/ML injection Use with the insulin pump. MAX DAILY DOSE 100 UNITS 1/26/22  Yes Nai Patton MD   ondansetron ODT (ZOFRAN-ODT) 4 MG disintegrating tablet  1/19/22  Yes Daisy Cullen MD   sucralfate (CARAFATE) 1 g tablet As Needed. 3/29/22  Yes Daisy Cullen MD   UNIFINE PENTIPS 32G X 4 MM misc USE WITH INSULIN PENS 4 TIMES A DAY  6/10/20  Yes Nai Patton MD   vitamin D (ERGOCALCIFEROL) 1.25 MG (07137 UT) capsule capsule Take 1 capsule by mouth 1 (One) Time Per Week. 8/3/22  Yes Nai Patton MD   zolpidem (AMBIEN) 10 MG tablet Take 20 mg by mouth At Night As Needed.   Yes Daisy Cullen MD   Cholecalciferol 250 MCG (54796 UT) tablet Take  by mouth.  8/3/22 Yes Daisy Cullen MD   clonazePAM (KlonoPIN) 1 MG tablet Take 1 tablet by mouth Daily. 3/29/22 8/3/22 Yes Provider, MD Daisy   gabapentin (NEURONTIN) 300 MG capsule Take 4 capsules by mouth " Every 12 (Twelve) Hours. 4/18/22 8/3/22 Yes ProviderDaisy MD   lamoTRIgine (LaMICtal) 200 MG tablet Take 200 mg by mouth Every Night.  8/3/22 Yes ProviderDaisy MD   NovoLOG 100 UNIT/ML injection  7/3/22 8/3/22 Yes Provider, MD Daisy   omeprazole (priLOSEC) 40 MG capsule  1/14/21 8/3/22 Yes ProviderDaisy MD   sertraline (ZOLOFT) 100 MG tablet 200 mg. 1/2/21 8/3/22 Yes Provider, MD Daisy   sertraline (ZOLOFT) 100 MG tablet Take 200 mg by mouth Daily.  8/3/22 Yes ProviderDaisy MD   vitamin D (ERGOCALCIFEROL) 1.25 MG (15338 UT) capsule capsule Take 1 capsule by mouth 1 (One) Time Per Week. 7/27/21 8/3/22 Yes Nai Patton MD       Lab Results (most recent)     Procedure Component Value Units Date/Time    POC Glucose [[id number]]  (Abnormal) Collected: 08/03/22 1259    Specimen: Blood Updated: 08/03/22 1301     Glucose 234 mg/dL      Comment: Serial Number: [id number]:  [id number]           Lab Results   Component Value Date    HGBA1C 8.2 (H) 07/28/2022    HGBA1C 8.1 (H) 03/14/2022    HGBA1C 8.3 (H) 01/19/2022      Lab Results   Component Value Date    GLUCOSE 333 (H) 07/28/2022    BUN 12 07/28/2022    CREATININE 0.85 07/28/2022    EGFRIFNONA 83 01/19/2022    BCR 14.1 07/28/2022    K 4.0 07/28/2022    CO2 23.7 07/28/2022    CALCIUM 9.5 07/28/2022    ALBUMIN 4.30 07/28/2022    LABIL2 1.1 11/05/2018    AST 17 07/28/2022    ALT 13 07/28/2022    CHOL 153 01/19/2022    LDL 73 01/19/2022    HDL 54 01/19/2022    TRIG 153 (H) 01/19/2022     Lab Results   Component Value Date    TSH 2.970 01/19/2022    FREET4 1.30 07/27/2021    EBGX11BP 27.5 01/19/2022       Assessment & Plan     Diagnoses and all orders for this visit:    1. Type 1 diabetes mellitus with diabetic neuropathy (HCC) (Primary) - stable  -     Hemoglobin A1c; Future  -     Microalbumin / Creatinine Urine Ratio - Urine, Clean Catch; Future    2. Vitamin D deficiency - will check status next visit  -     Vitamin D  25 Hydroxy; Future  -     vitamin D (ERGOCALCIFEROL) 1.25 MG (80248 UT) capsule capsule; Take 1 capsule by mouth 1 (One) Time Per Week.  Dispense: 12 capsule; Refill: 3    3. Hyperlipidemia, mixed - will check status next visit  -     Comprehensive Metabolic Panel; Future  -     Lipid Panel; Future    4. Hypothyroidism due to Hashimoto's thyroiditis - will check status next visit  -     TSH; Future    5. Insulin pump status    Other orders  -     POC Glucose    Wearing a 630G [employer] pump with sensors since January 2021.    Do PT exercises.  Continue same pump settings.  Restart atorvastatin & vit D once a week.  Call if blood sugars are running under 100 or over 200.        Nai Patton MD  08/03/22  21:52 EDT

## 2022-08-05 ENCOUNTER — TELEPHONE (OUTPATIENT)
Dept: ENDOCRINOLOGY | Facility: CLINIC | Age: 52
End: 2022-08-05

## 2022-08-23 ENCOUNTER — TELEPHONE (OUTPATIENT)
Dept: ENDOCRINOLOGY | Facility: CLINIC | Age: 52
End: 2022-08-23

## 2022-09-26 ENCOUNTER — OFFICE VISIT (OUTPATIENT)
Dept: ENDOCRINOLOGY | Facility: CLINIC | Age: 52
End: 2022-09-26

## 2022-09-26 ENCOUNTER — TELEPHONE (OUTPATIENT)
Dept: ENDOCRINOLOGY | Facility: CLINIC | Age: 52
End: 2022-09-26

## 2022-09-26 DIAGNOSIS — E10.65 TYPE 1 DIABETES MELLITUS WITH HYPERGLYCEMIA: ICD-10-CM

## 2022-09-26 RX ORDER — IBUPROFEN 600 MG/1
TABLET ORAL
Qty: 1 EACH | Refills: 3 | Status: SHIPPED | OUTPATIENT
Start: 2022-09-26

## 2022-09-26 RX ORDER — INSULIN ASPART 100 [IU]/ML
INJECTION, SOLUTION INTRAVENOUS; SUBCUTANEOUS
COMMUNITY
End: 2022-09-26 | Stop reason: SDUPTHER

## 2022-09-26 RX ORDER — INSULIN GLARGINE 100 [IU]/ML
INJECTION, SOLUTION SUBCUTANEOUS
Qty: 15 ML | Refills: 12 | Status: SHIPPED | OUTPATIENT
Start: 2022-09-26

## 2022-09-26 RX ORDER — SYRINGE-NEEDLE,INSULIN,0.5 ML 27GX1/2"
SYRINGE, EMPTY DISPOSABLE MISCELLANEOUS
Qty: 300 EACH | Refills: 3
Start: 2022-09-26

## 2022-09-26 RX ORDER — INSULIN ASPART 100 [IU]/ML
INJECTION, SOLUTION INTRAVENOUS; SUBCUTANEOUS
Qty: 30 ML | Refills: 12 | Status: SHIPPED | OUTPATIENT
Start: 2022-09-26 | End: 2022-11-17 | Stop reason: SDUPTHER

## 2022-10-03 ENCOUNTER — TELEPHONE (OUTPATIENT)
Dept: ENDOCRINOLOGY | Facility: CLINIC | Age: 52
End: 2022-10-03

## 2022-10-03 NOTE — TELEPHONE ENCOUNTER
[employer] reports scanned into phone note. Attempted to call pt to f/u re pump training but had to M to call back [facility].

## 2022-11-17 DIAGNOSIS — E10.65 TYPE 1 DIABETES MELLITUS WITH HYPERGLYCEMIA: Primary | ICD-10-CM

## 2022-11-17 RX ORDER — INSULIN ASPART 100 [IU]/ML
INJECTION, SOLUTION INTRAVENOUS; SUBCUTANEOUS
Qty: 30 ML | Refills: 12 | Status: SHIPPED | OUTPATIENT
Start: 2022-11-17 | End: 2022-11-30 | Stop reason: CLARIF

## 2022-11-30 DIAGNOSIS — E10.65 TYPE 1 DIABETES MELLITUS WITH HYPERGLYCEMIA: ICD-10-CM

## 2022-11-30 RX ORDER — INSULIN ASPART 100 [IU]/ML
INJECTION, SOLUTION INTRAVENOUS; SUBCUTANEOUS
Qty: 30 ML | Refills: 12 | Status: SHIPPED | OUTPATIENT
Start: 2022-11-30

## 2023-02-17 RX ORDER — EPINEPHRINE 0.3 MG/.3ML
0.3 INJECTION SUBCUTANEOUS
COMMUNITY
Start: 2022-08-14

## 2023-02-17 RX ORDER — TRAMADOL HYDROCHLORIDE 50 MG/1
TABLET ORAL
COMMUNITY
Start: 2022-11-18

## 2023-02-17 RX ORDER — BRIMONIDINE TARTRATE 2 MG/ML
SOLUTION/ DROPS OPHTHALMIC
COMMUNITY
Start: 2022-11-18

## 2023-03-31 RX ORDER — CITALOPRAM 20 MG/1
TABLET ORAL
COMMUNITY
Start: 2023-01-31

## 2023-03-31 RX ORDER — OXCARBAZEPINE 300 MG/1
TABLET, FILM COATED ORAL
COMMUNITY
Start: 2023-01-31

## 2023-03-31 RX ORDER — IBUPROFEN 600 MG/1
TABLET ORAL
COMMUNITY
Start: 2022-12-18 | End: 2023-04-12

## 2023-04-05 ENCOUNTER — LAB (OUTPATIENT)
Dept: LAB | Facility: HOSPITAL | Age: 53
End: 2023-04-05
Payer: COMMERCIAL

## 2023-04-05 DIAGNOSIS — E78.2 HYPERLIPIDEMIA, MIXED: ICD-10-CM

## 2023-04-05 DIAGNOSIS — E06.3 HYPOTHYROIDISM DUE TO HASHIMOTO'S THYROIDITIS: ICD-10-CM

## 2023-04-05 DIAGNOSIS — E55.9 VITAMIN D DEFICIENCY: ICD-10-CM

## 2023-04-05 DIAGNOSIS — E10.40 TYPE 1 DIABETES MELLITUS WITH DIABETIC NEUROPATHY: ICD-10-CM

## 2023-04-05 DIAGNOSIS — E03.8 HYPOTHYROIDISM DUE TO HASHIMOTO'S THYROIDITIS: ICD-10-CM

## 2023-04-05 LAB
25(OH)D3 SERPL-MCNC: 21.6 NG/ML (ref 30–100)
ALBUMIN SERPL-MCNC: 3.6 G/DL (ref 3.5–5.2)
ALBUMIN UR-MCNC: 1.3 MG/DL
ALBUMIN/GLOB SERPL: 0.9 G/DL
ALP SERPL-CCNC: 141 U/L (ref 39–117)
ALT SERPL W P-5'-P-CCNC: 11 U/L (ref 1–33)
ANION GAP SERPL CALCULATED.3IONS-SCNC: 9 MMOL/L (ref 5–15)
AST SERPL-CCNC: 15 U/L (ref 1–32)
BILIRUB SERPL-MCNC: <0.2 MG/DL (ref 0–1.2)
BUN SERPL-MCNC: 8 MG/DL (ref 6–20)
BUN/CREAT SERPL: 11.9 (ref 7–25)
CALCIUM SPEC-SCNC: 9.2 MG/DL (ref 8.6–10.5)
CHLORIDE SERPL-SCNC: 100 MMOL/L (ref 98–107)
CHOLEST SERPL-MCNC: 200 MG/DL (ref 0–200)
CO2 SERPL-SCNC: 29 MMOL/L (ref 22–29)
CREAT SERPL-MCNC: 0.67 MG/DL (ref 0.57–1)
CREAT UR-MCNC: 179.4 MG/DL
EGFRCR SERPLBLD CKD-EPI 2021: 105.3 ML/MIN/1.73
GLOBULIN UR ELPH-MCNC: 3.9 GM/DL
GLUCOSE SERPL-MCNC: 226 MG/DL (ref 65–99)
HBA1C MFR BLD: 7.4 % (ref 4.8–5.6)
HDLC SERPL-MCNC: 60 MG/DL (ref 40–60)
LDLC SERPL CALC-MCNC: 115 MG/DL (ref 0–100)
LDLC/HDLC SERPL: 1.86 {RATIO}
MICROALBUMIN/CREAT UR: 7.2 MG/G
POTASSIUM SERPL-SCNC: 5.2 MMOL/L (ref 3.5–5.2)
PROT SERPL-MCNC: 7.5 G/DL (ref 6–8.5)
SODIUM SERPL-SCNC: 138 MMOL/L (ref 136–145)
TRIGL SERPL-MCNC: 143 MG/DL (ref 0–150)
TSH SERPL DL<=0.05 MIU/L-ACNC: 1.51 UIU/ML (ref 0.27–4.2)
VLDLC SERPL-MCNC: 25 MG/DL (ref 5–40)

## 2023-04-05 PROCEDURE — 82306 VITAMIN D 25 HYDROXY: CPT

## 2023-04-05 PROCEDURE — 36415 COLL VENOUS BLD VENIPUNCTURE: CPT

## 2023-04-05 PROCEDURE — 80061 LIPID PANEL: CPT

## 2023-04-05 PROCEDURE — 83036 HEMOGLOBIN GLYCOSYLATED A1C: CPT

## 2023-04-05 PROCEDURE — 82570 ASSAY OF URINE CREATININE: CPT

## 2023-04-05 PROCEDURE — 84443 ASSAY THYROID STIM HORMONE: CPT

## 2023-04-05 PROCEDURE — 82043 UR ALBUMIN QUANTITATIVE: CPT

## 2023-04-05 PROCEDURE — 80053 COMPREHEN METABOLIC PANEL: CPT

## 2023-04-12 ENCOUNTER — OFFICE VISIT (OUTPATIENT)
Dept: ENDOCRINOLOGY | Facility: CLINIC | Age: 53
End: 2023-04-12
Payer: COMMERCIAL

## 2023-04-12 VITALS
WEIGHT: 170.2 LBS | SYSTOLIC BLOOD PRESSURE: 108 MMHG | HEIGHT: 69 IN | HEART RATE: 86 BPM | DIASTOLIC BLOOD PRESSURE: 74 MMHG | BODY MASS INDEX: 25.21 KG/M2

## 2023-04-12 DIAGNOSIS — Z96.41 INSULIN PUMP STATUS: ICD-10-CM

## 2023-04-12 DIAGNOSIS — E78.2 HYPERLIPIDEMIA, MIXED: ICD-10-CM

## 2023-04-12 DIAGNOSIS — E55.9 VITAMIN D DEFICIENCY: ICD-10-CM

## 2023-04-12 DIAGNOSIS — E10.40 TYPE 1 DIABETES MELLITUS WITH DIABETIC NEUROPATHY: Primary | ICD-10-CM

## 2023-04-12 PROBLEM — E03.9 HYPOTHYROIDISM: Status: RESOLVED | Noted: 2017-04-19 | Resolved: 2023-04-12

## 2023-04-12 PROCEDURE — 99214 OFFICE O/P EST MOD 30 MIN: CPT | Performed by: INTERNAL MEDICINE

## 2023-04-12 RX ORDER — CARBAMAZEPINE 400 MG/1
TABLET, EXTENDED RELEASE ORAL
COMMUNITY

## 2023-04-12 RX ORDER — SUMATRIPTAN 50 MG/1
TABLET, FILM COATED ORAL
COMMUNITY
Start: 2023-04-05

## 2023-04-12 NOTE — PATIENT INSTRUCTIONS
Keep up the good work!  Contact [employer] about switching back.  Increase exercise as planned.  Take vit D regularly.  Continue same pump settings & atorvastatin.  Call if blood sugars are running under 100 or over 200.  F/u in 6 months, with labs prior.

## 2023-04-16 NOTE — PROGRESS NOTES
[facility]        Patient Care Team:  Provider, No Known as PCP - General    Chief Complaint:    Chief Complaint   Patient presents with   • Diabetes     Type 1, BS: declined         Subjective   Here for diabetes f/u  Blood sugars: did not bring records  Exercise program: none  Taking vit D, but misses doses    Interval History:     Patient Complaints: having multiple issues with the T slim pump: does not like it  Patient Denies: hypoglycemia  History taken from: patient    Review of Systems:   Review of Systems   Constitutional: Positive for unexpected weight gain.   HENT: Negative for trouble swallowing.    Eyes: Positive for blurred vision.   Gastrointestinal: Negative for nausea.   Endocrine: Negative for polyuria.   Neurological: Negative for headache.     Gained 17 lb since last visit    Objective     Vital Signs      Vitals:    04/12/23 1319   BP: 108/74   Pulse: 86         Physical Exam:     General Appearance:    Alert, cooperative, in no acute distress   Head:    Normocephalic, without obvious abnormality, atraumatic   Eyes:            Lids and lashes normal, conjunctivae and sclerae normal, no   icterus, no pallor, corneas clear, PERRLA   Throat:   No oral lesions,  oral mucosa moist   Neck:   36 cm in circumference, thyroid firm, no carotid bruit   Lungs:     Clear    Heart:    Regular rhythm and normal rate   Chest Wall:    No abnormalities observed   Abdomen:     Normal bowel sounds, soft                 Extremities:   Plantar calluses, no edema               Pulses:   Pulses palpable and equal bilaterally   Skin:   Pump site clean   Neurologic:  DTR absent, able to feel the 10g monofilament          Results Review:    I have reviewed the patient's new clinical results, labs & imaging.    Medication Review:   Prior to Admission medications    Medication Sig Start Date End Date Taking? Authorizing Provider   acetone, urine, test strip Use to check for ketones prn 9/26/22  Yes  "Nai Patton MD   albuterol sulfate  (90 Base) MCG/ACT inhaler Inhale 2 puffs. 3/13/22  Yes Daisy Cullen MD   atorvastatin (LIPITOR) 40 MG tablet TAKE 1 TABLET BY MOUTH EVERY DAY  8/30/21  Yes Nai Patton MD   carBAMazepine XR (TEGretol  XR) 400 MG 12 hr tablet take 1 300mg tablet TID PO   Yes Daisy Cullen MD   citalopram (CeleXA) 20 MG tablet  1/31/23  Yes Daisy Cullen MD   clonazePAM (KlonoPIN) 1 MG tablet Take 1 tablet by mouth 2 (Two) Times a Day As Needed for Anxiety.   Yes Daisy Cullen MD   famotidine (PEPCID) 40 MG tablet Take 1 tablet by mouth.   Yes Daisy Cullen MD   gabapentin (NEURONTIN) 300 MG capsule TAKE 1 CAPSULE BY MOUTH THREE TIMES A DAY, max 3 per day 12/28/21  Yes Daisy Cullen MD   Insulin Glargine (BASAGLAR KWIKPEN) 100 UNIT/ML injection pen Inject 19 units once daily. Use in case of pump failure. 9/26/22  Yes Nai Patton MD   insulin lispro (HumaLOG) 100 UNIT/ML injection Use with the insulin pump. MAX DAILY DOSE 100 UNITS, please fill as vial 4/5/23  Yes Nai Patton MD   Insulin Syringe-Needle U-100 30G X 5/16\" 0.5 ML misc Use for insulin injections. 3x/d. Use in case of pump failure 9/26/22  Yes Nai Patton MD   ondansetron ODT (ZOFRAN-ODT) 4 MG disintegrating tablet  1/19/22  Yes Daisy Cullen MD   OXcarbazepine (TRILEPTAL) 300 MG tablet  1/31/23  Yes Daisy Cullen MD   SUMAtriptan (IMITREX) 50 MG tablet TAKE 1 TABLET BY MOUTH 1 TIME AS NEEDED FOR MIGRAINE. MAY REPEAT 1 TIME IN 2 HOURS -.  MG / 24 HOURS 4/5/23  Yes Daisy Cullen MD   Glucagon, rDNA, (Glucagon Emergency) 1 MG kit Use prn for severe low blood sugar  Patient not taking: Reported on 4/12/2023 9/26/22   Nai Patton MD   vitamin D (ERGOCALCIFEROL) 1.25 MG (59717 UT) capsule capsule Take 1 capsule by mouth 1 (One) Time Per Week.  Patient not taking: Reported on 4/12/2023 8/3/22   Nai Patton MD "       Lab Results (most recent)     None        Lab Results   Component Value Date    HGBA1C 7.40 (H) 04/05/2023    HGBA1C 8.2 (H) 07/28/2022    HGBA1C 8.1 (H) 03/14/2022      Lab Results   Component Value Date    GLUCOSE 226 (H) 04/05/2023    BUN 8 04/05/2023    CREATININE 0.67 04/05/2023    EGFRIFNONA 83 01/19/2022    BCR 11.9 04/05/2023    K 5.2 04/05/2023    CO2 29.0 04/05/2023    CALCIUM 9.2 04/05/2023    ALBUMIN 3.6 04/05/2023    LABIL2 1.1 11/05/2018    AST 15 04/05/2023    ALT 11 04/05/2023    CHOL 200 04/05/2023     (H) 04/05/2023    HDL 60 04/05/2023    TRIG 143 04/05/2023     Lab Results   Component Value Date    TSH 1.510 04/05/2023    FREET4 1.30 07/27/2021    LVLW41QS 21.6 (L) 04/05/2023     Microalb/cr ratio 7.2    Assessment & Plan     Diagnoses and all orders for this visit:    1. Type 1 diabetes mellitus with diabetic neuropathy (Primary) - improved  -     Hemoglobin A1c; Future    2. Vitamin D deficiency - not at goal  -     Vitamin D,25-Hydroxy; Future    3. Hyperlipidemia, mixed - stable  -     Comprehensive Metabolic Panel; Future    4. Insulin pump status    Wearing a T slim Control IQ insulin pump since Sept 2022.    Contact [employer] about switching back.  Increase exercise as planned.  Take vit D regularly.  Continue same pump settings & atorvastatin.  Call if blood sugars are running under 100 or over 200.        Nai Patton MD  04/15/23  22:55 EDT

## 2023-05-16 ENCOUNTER — TELEPHONE (OUTPATIENT)
Dept: ENDOCRINOLOGY | Facility: CLINIC | Age: 53
End: 2023-05-16

## 2023-05-16 NOTE — TELEPHONE ENCOUNTER
Caller: Jose Luis Zayas    Relationship: Emergency Contact    Best call back number: [phone]    What test/procedure requested: NEW INSURANCE AND PATIENT NOW HAS TO USE [facility]. [facility] NEEDS A NEW SCRIPT FOR DEXCOM AND T-SLIM TO FILL PATIENTS ORDER.  [phone] NUMBER FOR [facility].  When is it needed: ASAP        Additional information or concerns: JOSE LUIS CALLING FOR WIFE LONG WHO IS IN THE HOSPITAL. PLEASE CALL HIM WITH ANY QUESTIONS.

## 2023-06-06 ENCOUNTER — TELEPHONE (OUTPATIENT)
Dept: ENDOCRINOLOGY | Facility: CLINIC | Age: 53
End: 2023-06-06
Payer: COMMERCIAL

## 2023-06-06 DIAGNOSIS — E10.65 TYPE 1 DIABETES MELLITUS WITH HYPERGLYCEMIA: Primary | ICD-10-CM

## 2023-06-06 RX ORDER — PROCHLORPERAZINE 25 MG/1
SUPPOSITORY RECTAL
Qty: 9 EACH | Refills: 3 | Status: SHIPPED | OUTPATIENT
Start: 2023-06-06

## 2023-06-06 RX ORDER — PROCHLORPERAZINE 25 MG/1
1 SUPPOSITORY RECTAL
Qty: 1 EACH | Refills: 3 | Status: SHIPPED | OUTPATIENT
Start: 2023-06-06

## 2023-06-06 NOTE — TELEPHONE ENCOUNTER
Patient's  called stating insurance has changed and needs Dexcom and Tandem T-Slim infusion sets sent in to [facility]. States [facility] [facility] is now the insurance for medications. I send the [facility], however I do not think that is correct on the infusion sets. I believe that still falls under medical. Her  states he spoke with the pharmacist at [facility] who says they have the infusions sets and just needs Rx from us. I left message for Yasir Xie, to either call or email me about this.    I then called Bridgette Harley @ [employer] and discussed this with her. She says the supplies should still be under Medical. I gave her the [place] information we have in the chart. She ran her eligibility. Says should be covered at 90% but they do have a large deductible. She is going to send it to change of insurance team to verify and will have someone call him and also me in a day or two.    HUB OK to read.

## 2023-06-06 NOTE — TELEPHONE ENCOUNTER
Patient's  called stating insurance has changed and needs Dexcom and Tandem T-Slim infusion sets sent in to [facility]. States [employer] [facility] is now the insurance for medications. I send the [facility] to [facility], however I do not think that is correct on the infusion sets. I believe that still falls under medical. Her  states he spoke with the pharmacist at [facility] who says they have the infusions sets and just needs Rx from us. I left message for Yasir Xie, to either call or email me about this.

## 2023-06-08 NOTE — TELEPHONE ENCOUNTER
I had emailed insurance information to Fco Harley at [facility]. She has looked into it.     Email from Fco: Good morning Radha,     I wanted to follow up and let you know that the [employer] policy for Karly Zayas (1970) has been verified. Pump supplies are a DME benefit with the plan. Order is ready to ship once the co-insurance is paid. Message was left yesterday to collect and ship.    HUB OK To read

## 2023-06-15 ENCOUNTER — OFFICE (AMBULATORY)
Dept: URBAN - METROPOLITAN AREA CLINIC 64 | Facility: CLINIC | Age: 53
End: 2023-06-15

## 2023-06-15 VITALS — SYSTOLIC BLOOD PRESSURE: 134 MMHG | HEIGHT: 69 IN | HEART RATE: 84 BPM | DIASTOLIC BLOOD PRESSURE: 84 MMHG

## 2023-06-15 DIAGNOSIS — R19.4 CHANGE IN BOWEL HABIT: ICD-10-CM

## 2023-06-15 DIAGNOSIS — R19.7 DIARRHEA, UNSPECIFIED: ICD-10-CM

## 2023-06-15 DIAGNOSIS — R11.2 NAUSEA WITH VOMITING, UNSPECIFIED: ICD-10-CM

## 2023-06-15 DIAGNOSIS — R10.13 EPIGASTRIC PAIN: ICD-10-CM

## 2023-06-15 DIAGNOSIS — K31.84 GASTROPARESIS: ICD-10-CM

## 2023-06-15 PROCEDURE — 99214 OFFICE O/P EST MOD 30 MIN: CPT | Performed by: NURSE PRACTITIONER

## 2023-08-11 ENCOUNTER — OFFICE (AMBULATORY)
Dept: URBAN - METROPOLITAN AREA CLINIC 64 | Facility: CLINIC | Age: 53
End: 2023-08-11
Payer: COMMERCIAL

## 2023-08-11 VITALS
HEART RATE: 93 BPM | WEIGHT: 181 LBS | SYSTOLIC BLOOD PRESSURE: 114 MMHG | HEIGHT: 69 IN | DIASTOLIC BLOOD PRESSURE: 65 MMHG

## 2023-08-11 DIAGNOSIS — Z86.010 PERSONAL HISTORY OF COLONIC POLYPS: ICD-10-CM

## 2023-08-11 DIAGNOSIS — K59.00 CONSTIPATION, UNSPECIFIED: ICD-10-CM

## 2023-08-11 DIAGNOSIS — R10.12 LEFT UPPER QUADRANT PAIN: ICD-10-CM

## 2023-08-11 PROCEDURE — 99213 OFFICE O/P EST LOW 20 MIN: CPT | Performed by: INTERNAL MEDICINE

## 2023-08-18 ENCOUNTER — HOSPITAL ENCOUNTER (OUTPATIENT)
Facility: HOSPITAL | Age: 53
Setting detail: HOSPITAL OUTPATIENT SURGERY
Discharge: HOME | End: 2023-08-18
Attending: INTERNAL MEDICINE | Admitting: INTERNAL MEDICINE
Payer: COMMERCIAL

## 2023-08-18 ENCOUNTER — ANESTHESIA EVENT (OUTPATIENT)
Dept: GASTROENTEROLOGY | Facility: HOSPITAL | Age: 53
End: 2023-08-18
Payer: COMMERCIAL

## 2023-08-18 ENCOUNTER — ON CAMPUS - OUTPATIENT (AMBULATORY)
Dept: URBAN - METROPOLITAN AREA HOSPITAL 85 | Facility: HOSPITAL | Age: 53
End: 2023-08-18
Payer: COMMERCIAL

## 2023-08-18 ENCOUNTER — ANESTHESIA (OUTPATIENT)
Dept: GASTROENTEROLOGY | Facility: HOSPITAL | Age: 53
End: 2023-08-18
Payer: COMMERCIAL

## 2023-08-18 VITALS
DIASTOLIC BLOOD PRESSURE: 74 MMHG | OXYGEN SATURATION: 96 % | SYSTOLIC BLOOD PRESSURE: 138 MMHG | RESPIRATION RATE: 13 BRPM | BODY MASS INDEX: 14.89 KG/M2 | TEMPERATURE: 98.1 F | HEART RATE: 90 BPM | HEIGHT: 69 IN | WEIGHT: 100.5 LBS

## 2023-08-18 DIAGNOSIS — Z85.038 PERSONAL HISTORY OF OTHER MALIGNANT NEOPLASM OF LARGE INTEST: ICD-10-CM

## 2023-08-18 LAB
GLUCOSE BLDC GLUCOMTR-MCNC: 147 MG/DL (ref 70–105)
GLUCOSE BLDC GLUCOMTR-MCNC: 173 MG/DL (ref 70–105)

## 2023-08-18 PROCEDURE — 82948 REAGENT STRIP/BLOOD GLUCOSE: CPT

## 2023-08-18 PROCEDURE — 45378 DIAGNOSTIC COLONOSCOPY: CPT | Mod: 33 | Performed by: INTERNAL MEDICINE

## 2023-08-18 PROCEDURE — 25010000002 PROPOFOL 10 MG/ML EMULSION: Performed by: NURSE ANESTHETIST, CERTIFIED REGISTERED

## 2023-08-18 RX ORDER — ONDANSETRON 2 MG/ML
4 INJECTION INTRAMUSCULAR; INTRAVENOUS ONCE AS NEEDED
Status: DISCONTINUED | OUTPATIENT
Start: 2023-08-18 | End: 2023-08-18 | Stop reason: HOSPADM

## 2023-08-18 RX ORDER — SODIUM CHLORIDE 9 MG/ML
30 INJECTION, SOLUTION INTRAVENOUS CONTINUOUS PRN
Status: DISCONTINUED | OUTPATIENT
Start: 2023-08-18 | End: 2023-08-18 | Stop reason: HOSPADM

## 2023-08-18 RX ORDER — LIDOCAINE HYDROCHLORIDE 20 MG/ML
INJECTION, SOLUTION EPIDURAL; INFILTRATION; INTRACAUDAL; PERINEURAL AS NEEDED
Status: DISCONTINUED | OUTPATIENT
Start: 2023-08-18 | End: 2023-08-18 | Stop reason: SURG

## 2023-08-18 RX ADMIN — PROPOFOL 150 MCG/KG/MIN: 10 INJECTION, EMULSION INTRAVENOUS at 11:22

## 2023-08-18 RX ADMIN — SODIUM CHLORIDE: 9 INJECTION, SOLUTION INTRAVENOUS at 11:17

## 2023-08-18 RX ADMIN — LIDOCAINE HYDROCHLORIDE 60 MG: 20 INJECTION, SOLUTION EPIDURAL; INFILTRATION; INTRACAUDAL; PERINEURAL at 11:22

## 2023-08-18 NOTE — OP NOTE
COLONOSCOPY Procedure Report    Patient Name:  Karly Zayas  YOB: 1970    Date of Surgery:  8/18/2023     Pre-Op Diagnosis:  Personal history of colon cancer [Z85.038]       Post Op Diagnosis:  Normal mucosa of the colon and TI      Procedure/CPTr Codes:      Procedure(s):  COLONOSCOPY    Staff:  Surgeon(s):  Lori Cleary MD         Anesthesia: Monitored Anesthesia Care    Implants:    Nothing was implanted during the procedure    Specimen:        See below    No blood loss    Complications:  None    Description of Procedure:  Informed consent was obtained for the procedure, including sedation.  Risks of perforation, hemorrhage, adverse drug reaction and aspiration were discussed.  The patient was brought into the endoscopy suite. Continuous cardiopulmonary monitoring was performed.  The patient was placed in the left lateral decubitus position. After adequate sedation was attained, the digital rectal exam was performed which was normal.  Subsequently, the pediatric Olympus colonoscope was inserted into the patient's rectum and advanced to the level of the cecum and terminal ileum without difficulty.  The bowel prep was excellent.  Circumferential examination of the patient's colon was performed on scope withdrawal.  A retroflex exam was performed in the rectum which showed normal mucosa.  The bowel was decompressed, the scope was withdrawn from the patient, and the patient tolerated the procedure well. There were no immediate post-operative complications.     Findings:    Normal mucosa of the colon and TI        Recommendations:  Follow-up histopathology  High-fiber diet  Repeat colonoscopy in 5 years      Lori Cleary MD     Date: 8/18/2023  Time: 11:44 EDT

## 2023-08-18 NOTE — DISCHARGE INSTRUCTIONS
A responsible adult should stay with you and you should rest quietly for the rest of the day.    Do not drink alcohol, drive, operate any heavy machinery or power tools or make any legal/important decisions for the next 24 hours.     Progress your diet as tolerated.  If you begin to experience severe pain, increased shortness of breath, racing heartbeat or a fever above 101 F, seek immediate medical attention.     Follow up with MD as instructed. Call office for results in 3 to 5 days if needed.     Follow-up histopathology  High-fiber diet  Repeat colonoscopy in 5 year

## 2023-08-18 NOTE — H&P
" LOS: 0 days   Patient Care Team:  Provider, No Known as PCP - General      Subjective     Interval History:     Subjective: Personal history of colon polyps      ROS:   No chest pain, shortness of breath, or cough.        Medication Review:   No current facility-administered medications for this encounter.      Objective     Vital Signs  Vitals:    08/17/23 1051 08/18/23 1049   BP:  151/67   BP Location:  Left arm   Patient Position:  Lying   Pulse:  94   Resp:  20   Temp:  98.1 øF (36.7 øC)   TempSrc:  Oral   SpO2:  100%   Weight: 45.6 kg (100 lb 8 oz)    Height: 175.3 cm (69\")        Physical Exam:    General Appearance:    Awake and alert, in no acute distress   Head:    Normocephalic, without obvious abnormality   Eyes:          Conjunctivae normal, anicteric sclera   Throat:   No oral lesions, no thrush, oral mucosa moist   Neck:   No adenopathy, supple, no JVD   Lungs:     respirations regular, even and unlabored   Abdomen:     Soft, non-tender, no rebound or guarding, non-distended, no hepatosplenomegaly   Rectal:     Deferred   Extremities:   No edema, no cyanosis   Skin:   No bruising or rash, no jaundice        Results Review:    Lab Results (last 24 hours)       Procedure Component Value Units Date/Time    POC Glucose Once [[id number]]  (Abnormal) Collected: 08/18/23 1043    Specimen: Blood Updated: 08/18/23 1044     Glucose 173 mg/dL      Comment: Serial Number: [id number]:  [id number]               Imaging Results (Last 24 Hours)       ** No results found for the last 24 hours. **              Assessment & Plan   Proceed with scope and MAC anesthesia        Lori Cleary MD  08/18/23  11:15 EDT  "

## 2023-08-18 NOTE — ANESTHESIA PREPROCEDURE EVALUATION
Anesthesia Evaluation     NPO Solid Status: > 8 hours  NPO Liquid Status: > 2 hours           Airway   Mallampati: II  TM distance: >3 FB  Neck ROM: full  No difficulty expected  Dental - normal exam     Pulmonary - normal exam   Cardiovascular - normal exam    (+) hyperlipidemia      Neuro/Psych  (+) numbness, psychiatric history Anxiety  GI/Hepatic/Renal/Endo    (+) renal disease, diabetes mellitus type 1 well controlled, thyroid problem     Musculoskeletal     Abdominal  - normal exam    Bowel sounds: normal.   Substance History      OB/GYN          Other   arthritis,                   Anesthesia Plan    ASA 3     MAC   total IV anesthesia  intravenous induction     Anesthetic plan, risks, benefits, and alternatives have been provided, discussed and informed consent has been obtained with: patient.  Pre-procedure education provided  Plan discussed with CRNA.    CODE STATUS:

## 2023-08-18 NOTE — ANESTHESIA POSTPROCEDURE EVALUATION
Patient: Karly Zayas    Procedure Summary       Date: 08/18/23 Room / Location: [facility] ENDOSCOPY 4 / [facility] ENDOSCOPY    Anesthesia Start: 1117 Anesthesia Stop: 1137    Procedure: COLONOSCOPY Diagnosis:       Personal history of colon cancer      (Personal history of colon cancer [Z85.038])    Surgeons: Lori Cleary MD Provider: Mac Patino MD    Anesthesia Type: MAC ASA Status: 3            Anesthesia Type: MAC    Vitals  Vitals Value Taken Time   /74 08/18/23 1208   Temp     Pulse 90 08/18/23 1209   Resp 13 08/18/23 1208   SpO2 94 % 08/18/23 1209   Vitals shown include unvalidated device data.        Post Anesthesia Care and Evaluation    Patient location during evaluation: PACU  Patient participation: complete - patient participated  Level of consciousness: awake  Pain scale: See nurse's notes for pain score.  Pain management: adequate    Airway patency: patent  Anesthetic complications: No anesthetic complications  PONV Status: none  Cardiovascular status: acceptable  Respiratory status: acceptable and spontaneous ventilation  Hydration status: acceptable    Comments: Patient seen and examined postoperatively; vital signs stable; SpO2 greater than or equal to 90%; cardiopulmonary status stable; nausea/vomiting adequately controlled; pain adequately controlled; no apparent anesthesia complications; patient discharged from anesthesia care when discharge criteria were met

## 2023-09-13 ENCOUNTER — TELEPHONE (OUTPATIENT)
Dept: ENDOCRINOLOGY | Facility: CLINIC | Age: 53
End: 2023-09-13

## 2023-09-13 NOTE — TELEPHONE ENCOUNTER
Caller: CHESTER COOPER    Relationship:[facility]    Callback number: [phone]   Is it ok to leave a message: [x] Yes [] No    Requested medication for samples: DEXCOM SENSOR AND TRANSMETER    How much medication does the patient currently have left: NONE    Who will be picking up the samples: [facility]    Do you need information about patient financial assistance for this medication: [] Yes [x] No    Additional details provided:

## 2023-11-20 ENCOUNTER — OFFICE (AMBULATORY)
Dept: URBAN - METROPOLITAN AREA CLINIC 64 | Facility: CLINIC | Age: 53
End: 2023-11-20
Payer: COMMERCIAL

## 2023-11-20 VITALS
HEART RATE: 82 BPM | HEIGHT: 69 IN | DIASTOLIC BLOOD PRESSURE: 70 MMHG | WEIGHT: 179 LBS | SYSTOLIC BLOOD PRESSURE: 99 MMHG

## 2023-11-20 DIAGNOSIS — R19.4 CHANGE IN BOWEL HABIT: ICD-10-CM

## 2023-11-20 DIAGNOSIS — R11.2 NAUSEA WITH VOMITING, UNSPECIFIED: ICD-10-CM

## 2023-11-20 DIAGNOSIS — R19.7 DIARRHEA, UNSPECIFIED: ICD-10-CM

## 2023-11-20 DIAGNOSIS — K31.84 GASTROPARESIS: ICD-10-CM

## 2023-11-20 PROCEDURE — 99213 OFFICE O/P EST LOW 20 MIN: CPT | Performed by: NURSE PRACTITIONER

## 2023-11-20 RX ORDER — ONDANSETRON 4 MG/1
TABLET, ORALLY DISINTEGRATING ORAL
Qty: 270 | Refills: 0 | Status: ACTIVE

## 2024-02-13 ENCOUNTER — APPOINTMENT (OUTPATIENT)
Dept: GENERAL RADIOLOGY | Facility: HOSPITAL | Age: 54
End: 2024-02-13
Payer: COMMERCIAL

## 2024-02-13 ENCOUNTER — HOSPITAL ENCOUNTER (EMERGENCY)
Facility: HOSPITAL | Age: 54
Discharge: HOME | End: 2024-02-13
Attending: EMERGENCY MEDICINE | Admitting: EMERGENCY MEDICINE
Payer: COMMERCIAL

## 2024-02-13 VITALS
HEART RATE: 78 BPM | BODY MASS INDEX: 28.88 KG/M2 | RESPIRATION RATE: 16 BRPM | TEMPERATURE: 98.9 F | WEIGHT: 195 LBS | DIASTOLIC BLOOD PRESSURE: 90 MMHG | SYSTOLIC BLOOD PRESSURE: 176 MMHG | HEIGHT: 69 IN | OXYGEN SATURATION: 97 %

## 2024-02-13 DIAGNOSIS — S82.832A CLOSED FRACTURE OF DISTAL END OF LEFT FIBULA, UNSPECIFIED FRACTURE MORPHOLOGY, INITIAL ENCOUNTER: ICD-10-CM

## 2024-02-13 DIAGNOSIS — S92.309A: Primary | ICD-10-CM

## 2024-02-13 PROCEDURE — 63710000001 ONDANSETRON PER 8 MG: Performed by: EMERGENCY MEDICINE

## 2024-02-13 PROCEDURE — 99283 EMERGENCY DEPT VISIT LOW MDM: CPT

## 2024-02-13 PROCEDURE — 73610 X-RAY EXAM OF ANKLE: CPT

## 2024-02-13 PROCEDURE — 73630 X-RAY EXAM OF FOOT: CPT

## 2024-02-13 RX ORDER — ONDANSETRON 4 MG/1
4 TABLET, FILM COATED ORAL ONCE
Status: COMPLETED | OUTPATIENT
Start: 2024-02-13 | End: 2024-02-13

## 2024-02-13 RX ORDER — HYDROCODONE BITARTRATE AND ACETAMINOPHEN 7.5; 325 MG/1; MG/1
1 TABLET ORAL ONCE
Status: COMPLETED | OUTPATIENT
Start: 2024-02-13 | End: 2024-02-13

## 2024-02-13 RX ADMIN — ONDANSETRON HYDROCHLORIDE 4 MG: 4 TABLET, FILM COATED ORAL at 04:03

## 2024-02-13 RX ADMIN — HYDROCODONE BITARTRATE AND ACETAMINOPHEN 1 TABLET: 7.5; 325 TABLET ORAL at 04:03

## 2024-02-14 ENCOUNTER — OFFICE VISIT (OUTPATIENT)
Dept: PODIATRY | Facility: CLINIC | Age: 54
End: 2024-02-14
Payer: COMMERCIAL

## 2024-02-14 VITALS
HEART RATE: 73 BPM | WEIGHT: 195 LBS | BODY MASS INDEX: 28.88 KG/M2 | HEIGHT: 69 IN | RESPIRATION RATE: 14 BRPM | OXYGEN SATURATION: 96 %

## 2024-02-14 DIAGNOSIS — E10.42 WELL CONTROLLED TYPE 1 DIABETES MELLITUS WITH PERIPHERAL NEUROPATHY: ICD-10-CM

## 2024-02-14 DIAGNOSIS — S82.65XA CLOSED NONDISPLACED FRACTURE OF LATERAL MALLEOLUS OF LEFT FIBULA, INITIAL ENCOUNTER: ICD-10-CM

## 2024-02-14 DIAGNOSIS — M25.572 ACUTE LEFT ANKLE PAIN: Primary | ICD-10-CM

## 2024-02-14 DIAGNOSIS — S92.355A CLOSED NONDISPLACED FRACTURE OF FIFTH METATARSAL BONE OF LEFT FOOT, INITIAL ENCOUNTER: ICD-10-CM

## 2024-03-14 ENCOUNTER — OFFICE VISIT (OUTPATIENT)
Dept: PODIATRY | Facility: CLINIC | Age: 54
End: 2024-03-14
Payer: COMMERCIAL

## 2024-03-14 DIAGNOSIS — S92.355D CLOSED NONDISPLACED FRACTURE OF FIFTH METATARSAL BONE OF LEFT FOOT WITH ROUTINE HEALING, SUBSEQUENT ENCOUNTER: ICD-10-CM

## 2024-03-14 DIAGNOSIS — M25.572 ACUTE LEFT ANKLE PAIN: Primary | ICD-10-CM

## 2024-03-14 DIAGNOSIS — E10.42 WELL CONTROLLED TYPE 1 DIABETES MELLITUS WITH PERIPHERAL NEUROPATHY: ICD-10-CM

## 2024-03-14 DIAGNOSIS — S82.65XD CLOSED NONDISPLACED FRACTURE OF LATERAL MALLEOLUS OF LEFT FIBULA WITH ROUTINE HEALING, SUBSEQUENT ENCOUNTER: ICD-10-CM

## 2024-03-14 RX ORDER — NORTRIPTYLINE HYDROCHLORIDE 10 MG/1
10 CAPSULE ORAL NIGHTLY
COMMUNITY
Start: 2024-02-15

## 2024-03-14 RX ORDER — HYDROCODONE BITARTRATE AND ACETAMINOPHEN 10; 325 MG/1; MG/1
1 TABLET ORAL EVERY 6 HOURS PRN
COMMUNITY
Start: 2024-03-01

## 2024-03-14 NOTE — PROGRESS NOTES
"03/14/2024  Foot and Ankle Surgery - Established Patient/Follow-up  Provider: Dr. Adam Cruz DPM  Location: [facility]    Subjective:  Karly Zayas is a 53 y.o. female.     Chief Complaint   Patient presents with    Left Ankle - Follow-up, Fracture     Closed nondisplaced fx of lateral malleolus of left fibula    Left Foot - Follow-up, Fracture     Closed nondisplaced fx 5th metatarsal lt foot    Follow-up     NPatricia Liamirah aprn  11/15/2023       HPI: The patient is a 53-year-old female who presents for a follow-up regarding the left ankle. She is accompanied by an adult male.    She reports she has been in the boot. She notes she \"hobbles\" to the bathroom or the kitchen to get water without the crutches.    Allergies   Allergen Reactions    Prednisone Swelling     high fever/ body aches    Nitrofurantoin Monohyd Macro Other (See Comments)     severe reaction- hospitalized       Current Outpatient Medications on File Prior to Visit   Medication Sig Dispense Refill    acetone, urine, test strip Use to check for ketones prn 100 each 3    albuterol sulfate  (90 Base) MCG/ACT inhaler Inhale 2 puffs.      atorvastatin (LIPITOR) 40 MG tablet TAKE 1 TABLET BY MOUTH EVERY DAY  90 tablet 4    citalopram (CeleXA) 20 MG tablet       clonazePAM (KlonoPIN) 1 MG tablet Take 1 tablet by mouth 2 (Two) Times a Day As Needed for Anxiety.      Continuous Blood Gluc Sensor (Dexcom G6 Sensor) Every 10 (Ten) Days. 9 each 3    Continuous Blood Gluc Transmit (Dexcom G6 Transmitter) misc 1 each Every 3 (Three) Months. 1 each 3    famotidine (PEPCID) 40 MG tablet Take 1 tablet by mouth.      gabapentin (NEURONTIN) 300 MG capsule TAKE 1 CAPSULE BY MOUTH THREE TIMES A DAY, max 3 per day      Glucagon, rDNA, (Glucagon Emergency) 1 MG kit Use prn for severe low blood sugar 1 each 3    HYDROcodone-acetaminophen (NORCO)  MG per tablet Take 1 tablet by mouth Every 6 (Six) Hours As Needed.      Insulin Glargine " "(BASAGLAR KWIKPEN) 100 UNIT/ML injection pen Inject 19 units once daily. Use in case of pump failure. 15 mL 12    insulin lispro (HumaLOG) 100 UNIT/ML injection Use with the insulin pump. MAX DAILY DOSE 100 UNITS, please fill as vial 30 mL 5    Insulin Syringe-Needle U-100 30G X 5/16\" 0.5 ML misc Use for insulin injections. 3x/d. Use in case of pump failure 300 each 3    nortriptyline (PAMELOR) 10 MG capsule Take 1 capsule by mouth Every Night.      ondansetron ODT (ZOFRAN-ODT) 4 MG disintegrating tablet       OXcarbazepine (TRILEPTAL) 300 MG tablet       oxyCODONE-acetaminophen (PERCOCET) 5-325 MG per tablet Take 1-2 tablets by mouth Every 6 (Six) Hours As Needed (pain). 12 tablet 0    SUMAtriptan (IMITREX) 50 MG tablet       vitamin D (ERGOCALCIFEROL) 1.25 MG (45123 UT) capsule capsule Take 1 capsule by mouth 1 (One) Time Per Week. 12 capsule 3     No current facility-administered medications on file prior to visit.       Objective   LMP 06/06/2018     Foot/Ankle Exam    GENERAL  Orientation:  AAOx3  Affect:  appropriate    VASCULAR     Left Foot Vascularity   Normal vascular exam    Dorsalis pedis:  2+  Posterior tibial:  2+  Skin temperature:  warm  Edema grading:  None  CFT:  < 3 seconds  Pedal hair growth:  Present  Varicosities:  none     NEUROLOGIC     Left Foot Neurologic   Light touch sensation: normal  Hot/Cold sensation:  normal  Achilles reflex:  2+    MUSCULOSKELETAL     Left Foot Musculoskeletal   Arch:  Normal    DERMATOLOGIC      Right Foot Dermatologic   Nails comment:  Nails 1-5     Left Foot Dermatologic   Skin  Left foot skin is intact.   Nails comment:  Nails 1-5     Left foot additional comments: Ecchymosis and swelling involving the lateral aspect of the foot and ankle. Range of motion is limited secondary to guarding. No deformity or carola instability. No proximal fibular pain. Discomfort involving the lateral malleolus as well as the 5th metatarsal.    03/14/2024: Continued improvement with " less swelling. Range of motion has improved. No progressive deformity or instability. Continued discomfort involving the fifth metatarsal region.      Assessment & Plan   Diagnoses and all orders for this visit:    1. Acute left ankle pain (Primary)    2. Closed nondisplaced fracture of lateral malleolus of left fibula with routine healing, subsequent encounter  -     XR Ankle 3+ View Left    3. Closed nondisplaced fracture of fifth metatarsal bone of left foot with routine healing, subsequent encounter  -     XR Foot 3+ View Left    4. Well controlled type 1 diabetes mellitus with peripheral neuropathy      Patient presents for a follow up regarding her left ankle and foot. X-rays were independently reviewed with the patient. She may bear weight in the boot. She may slowly discontinue use of the crutches. Avoid uneven terrain. Avoid excessive activities. Recommend the patient perform range of motion and alphabet exercises. Will provide the patient with an ankle sleeve today. Advised the patient she may wear compression stockings. The patient will follow up in 4 weeks. We will repeat x-rays of the foot and ankle at that time.     Greater than 20 minutes spent before, during, and after evaluation for patient care.      Orders Placed This Encounter   Procedures    XR Ankle 3+ View Left     Order Specific Question:   Reason for Exam:     Answer:   Closed nondisplaced fx of lateral malleolus of left fibula  room 10  wb     Order Specific Question:   Does this patient have a diabetic monitoring/medication delivering device on?     Answer:   No     Order Specific Question:   Release to patient     Answer:   Routine Release [[id number]]    XR Foot 3+ View Left     Order Specific Question:   Reason for Exam:     Answer:   Closed nondisplaced fx 5th metatarsal lt foot  room 10  wb     Order Specific Question:   Does this patient have a diabetic monitoring/medication delivering device on?     Answer:   No     Order Specific  Question:   Release to patient     Answer:   Routine Release [[id number]]          Note is dictated utilizing voice recognition software. Unfortunately this leads to occasional typographical errors. I apologize in advance if the situation occurs. If questions occur please do not hesitate to call our office.    Transcribed from ambient dictation for VALENTIN Cruz DPM by Speedy Angel.  03/14/24   13:51 EDT    Patient or patient representative verbalized consent to the visit recording.  I have personally performed the services described in this document as transcribed by the above individual, and it is both accurate and complete.

## 2024-03-21 DIAGNOSIS — E10.65 TYPE 1 DIABETES MELLITUS WITH HYPERGLYCEMIA: Primary | ICD-10-CM

## 2024-03-22 RX ORDER — INSULIN LISPRO 100 [IU]/ML
INJECTION, SOLUTION INTRAVENOUS; SUBCUTANEOUS
Qty: 30 ML | Refills: 0 | Status: SHIPPED | OUTPATIENT
Start: 2024-03-22

## 2024-04-18 ENCOUNTER — APPOINTMENT (OUTPATIENT)
Dept: CT IMAGING | Facility: HOSPITAL | Age: 54
DRG: 637 | End: 2024-04-18
Payer: COMMERCIAL

## 2024-04-18 ENCOUNTER — HOSPITAL ENCOUNTER (INPATIENT)
Facility: HOSPITAL | Age: 54
LOS: 4 days | Discharge: HOME HEALTH | DRG: 637 | End: 2024-04-22
Attending: INTERNAL MEDICINE | Admitting: HOSPITALIST
Payer: COMMERCIAL

## 2024-04-18 ENCOUNTER — APPOINTMENT (OUTPATIENT)
Dept: GENERAL RADIOLOGY | Facility: HOSPITAL | Age: 54
DRG: 637 | End: 2024-04-18
Payer: COMMERCIAL

## 2024-04-18 DIAGNOSIS — I63.9 ACUTE CVA (CEREBROVASCULAR ACCIDENT): ICD-10-CM

## 2024-04-18 DIAGNOSIS — N17.9 AKI (ACUTE KIDNEY INJURY): ICD-10-CM

## 2024-04-18 DIAGNOSIS — E10.10 DIABETIC KETOACIDOSIS WITHOUT COMA ASSOCIATED WITH TYPE 1 DIABETES MELLITUS: Primary | ICD-10-CM

## 2024-04-18 DIAGNOSIS — R10.84 GENERALIZED ABDOMINAL PAIN: ICD-10-CM

## 2024-04-18 DIAGNOSIS — E10.65 TYPE 1 DIABETES MELLITUS WITH HYPERGLYCEMIA: ICD-10-CM

## 2024-04-18 LAB
ACETONE BLD QL: ABNORMAL
ALBUMIN SERPL-MCNC: 4.5 G/DL (ref 3.5–5.2)
ALBUMIN/GLOB SERPL: 1.1 G/DL
ALP SERPL-CCNC: 147 U/L (ref 39–117)
ALT SERPL W P-5'-P-CCNC: 10 U/L (ref 1–33)
ANION GAP SERPL CALCULATED.3IONS-SCNC: 16 MMOL/L (ref 5–15)
ANION GAP SERPL CALCULATED.3IONS-SCNC: 27 MMOL/L (ref 5–15)
ANION GAP SERPL CALCULATED.3IONS-SCNC: 38 MMOL/L (ref 5–15)
AST SERPL-CCNC: 13 U/L (ref 1–32)
ATMOSPHERIC PRESS: ABNORMAL MM[HG]
BASE EXCESS BLDV CALC-SCNC: -21.1 MMOL/L (ref -2–2)
BASOPHILS # BLD AUTO: 0.08 10*3/MM3 (ref 0–0.2)
BASOPHILS NFR BLD AUTO: 0.3 % (ref 0–1.5)
BDY SITE: ABNORMAL
BILIRUB SERPL-MCNC: 0.2 MG/DL (ref 0–1.2)
BUN SERPL-MCNC: 36 MG/DL (ref 6–20)
BUN SERPL-MCNC: 52 MG/DL (ref 6–20)
BUN SERPL-MCNC: 54 MG/DL (ref 6–20)
BUN/CREAT SERPL: 26 (ref 7–25)
BUN/CREAT SERPL: 29.4 (ref 7–25)
BUN/CREAT SERPL: 30.5 (ref 7–25)
CALCIUM SPEC-SCNC: 10.2 MG/DL (ref 8.6–10.5)
CALCIUM SPEC-SCNC: 8.7 MG/DL (ref 8.6–10.5)
CALCIUM SPEC-SCNC: 9.8 MG/DL (ref 8.6–10.5)
CHLORIDE SERPL-SCNC: 103 MMOL/L (ref 98–107)
CHLORIDE SERPL-SCNC: 81 MMOL/L (ref 98–107)
CHLORIDE SERPL-SCNC: 94 MMOL/L (ref 98–107)
CO2 BLDA-SCNC: 6.7 MMOL/L (ref 22–29)
CO2 SERPL-SCNC: 12 MMOL/L (ref 22–29)
CO2 SERPL-SCNC: 13 MMOL/L (ref 22–29)
CO2 SERPL-SCNC: 5 MMOL/L (ref 22–29)
CREAT SERPL-MCNC: 1.18 MG/DL (ref 0.57–1)
CREAT SERPL-MCNC: 1.77 MG/DL (ref 0.57–1)
CREAT SERPL-MCNC: 2.08 MG/DL (ref 0.57–1)
D-LACTATE SERPL-SCNC: 1.6 MMOL/L (ref 0.3–2)
DEPRECATED RDW RBC AUTO: 42 FL (ref 37–54)
EGFRCR SERPLBLD CKD-EPI 2021: 28 ML/MIN/1.73
EGFRCR SERPLBLD CKD-EPI 2021: 34 ML/MIN/1.73
EGFRCR SERPLBLD CKD-EPI 2021: 55.3 ML/MIN/1.73
EOSINOPHIL # BLD AUTO: 0.01 10*3/MM3 (ref 0–0.4)
EOSINOPHIL NFR BLD AUTO: 0 % (ref 0.3–6.2)
ERYTHROCYTE [DISTWIDTH] IN BLOOD BY AUTOMATED COUNT: 13.5 % (ref 12.3–15.4)
FLUAV SUBTYP SPEC NAA+PROBE: NOT DETECTED
FLUBV RNA ISLT QL NAA+PROBE: NOT DETECTED
GEN 5 2HR TROPONIN T REFLEX: 27 NG/L
GLOBULIN UR ELPH-MCNC: 4.1 GM/DL
GLUCOSE BLDC GLUCOMTR-MCNC: 175 MG/DL (ref 70–105)
GLUCOSE BLDC GLUCOMTR-MCNC: 182 MG/DL (ref 70–105)
GLUCOSE BLDC GLUCOMTR-MCNC: 185 MG/DL (ref 70–105)
GLUCOSE BLDC GLUCOMTR-MCNC: 194 MG/DL (ref 70–105)
GLUCOSE BLDC GLUCOMTR-MCNC: 198 MG/DL (ref 70–105)
GLUCOSE BLDC GLUCOMTR-MCNC: 210 MG/DL (ref 70–105)
GLUCOSE BLDC GLUCOMTR-MCNC: 212 MG/DL (ref 70–105)
GLUCOSE BLDC GLUCOMTR-MCNC: 276 MG/DL (ref 70–105)
GLUCOSE BLDC GLUCOMTR-MCNC: 374 MG/DL (ref 70–105)
GLUCOSE BLDC GLUCOMTR-MCNC: 393 MG/DL (ref 70–105)
GLUCOSE BLDC GLUCOMTR-MCNC: 455 MG/DL (ref 70–105)
GLUCOSE BLDC GLUCOMTR-MCNC: >600 MG/DL (ref 70–105)
GLUCOSE SERPL-MCNC: 207 MG/DL (ref 65–99)
GLUCOSE SERPL-MCNC: 371 MG/DL (ref 65–99)
GLUCOSE SERPL-MCNC: 371 MG/DL (ref 65–99)
GLUCOSE SERPL-MCNC: 681 MG/DL (ref 65–99)
HBA1C MFR BLD: 9.1 % (ref 4.8–5.6)
HCO3 BLDV-SCNC: 6.1 MMOL/L (ref 22–26)
HCT VFR BLD AUTO: 45.4 % (ref 34–46.6)
HGB BLD-MCNC: 14.2 G/DL (ref 12–15.9)
IMM GRANULOCYTES # BLD AUTO: 0.21 10*3/MM3 (ref 0–0.05)
IMM GRANULOCYTES NFR BLD AUTO: 0.9 % (ref 0–0.5)
INHALED O2 CONCENTRATION: 21 %
LIPASE SERPL-CCNC: 292 U/L (ref 13–60)
LYMPHOCYTES # BLD AUTO: 1.25 10*3/MM3 (ref 0.7–3.1)
LYMPHOCYTES NFR BLD AUTO: 5.2 % (ref 19.6–45.3)
MAGNESIUM SERPL-MCNC: 2.2 MG/DL (ref 1.6–2.6)
MAGNESIUM SERPL-MCNC: 2.5 MG/DL (ref 1.6–2.6)
MAGNESIUM SERPL-MCNC: 2.9 MG/DL (ref 1.6–2.6)
MCH RBC QN AUTO: 27.2 PG (ref 26.6–33)
MCHC RBC AUTO-ENTMCNC: 31.3 G/DL (ref 31.5–35.7)
MCV RBC AUTO: 86.8 FL (ref 79–97)
MODALITY: ABNORMAL
MONOCYTES # BLD AUTO: 1.67 10*3/MM3 (ref 0.1–0.9)
MONOCYTES NFR BLD AUTO: 7 % (ref 5–12)
MRSA DNA SPEC QL NAA+PROBE: NORMAL
NEUTROPHILS NFR BLD AUTO: 20.59 10*3/MM3 (ref 1.7–7)
NEUTROPHILS NFR BLD AUTO: 86.6 % (ref 42.7–76)
NRBC BLD AUTO-RTO: 0 /100 WBC (ref 0–0.2)
OSMOLALITY SERPL: 339 MOSM/KG (ref 275–295)
PCO2 BLDV: 18.8 MM HG (ref 42–51)
PH BLDV: 7.12 PH UNITS (ref 7.32–7.43)
PHOSPHATE SERPL-MCNC: 2 MG/DL (ref 2.5–4.5)
PHOSPHATE SERPL-MCNC: 2.9 MG/DL (ref 2.5–4.5)
PHOSPHATE SERPL-MCNC: 5.4 MG/DL (ref 2.5–4.5)
PLATELET # BLD AUTO: 750 10*3/MM3 (ref 140–450)
PMV BLD AUTO: 9.4 FL (ref 6–12)
PO2 BLDV: 47.5 MM HG (ref 40–42)
POTASSIUM SERPL-SCNC: 4.4 MMOL/L (ref 3.5–5.2)
POTASSIUM SERPL-SCNC: 4.6 MMOL/L (ref 3.5–5.2)
POTASSIUM SERPL-SCNC: 4.8 MMOL/L (ref 3.5–5.2)
PROT SERPL-MCNC: 8.6 G/DL (ref 6–8.5)
RBC # BLD AUTO: 5.23 10*6/MM3 (ref 3.77–5.28)
SAO2 % BLDCOV: 70.3 % (ref 45–75)
SARS-COV-2 RNA RESP QL NAA+PROBE: NOT DETECTED
SODIUM SERPL-SCNC: 124 MMOL/L (ref 136–145)
SODIUM SERPL-SCNC: 132 MMOL/L (ref 136–145)
SODIUM SERPL-SCNC: 133 MMOL/L (ref 136–145)
TROPONIN T DELTA: -5 NG/L
TROPONIN T SERPL HS-MCNC: 32 NG/L
WBC NRBC COR # BLD AUTO: 23.81 10*3/MM3 (ref 3.4–10.8)

## 2024-04-18 PROCEDURE — 83735 ASSAY OF MAGNESIUM: CPT | Performed by: NURSE PRACTITIONER

## 2024-04-18 PROCEDURE — 82948 REAGENT STRIP/BLOOD GLUCOSE: CPT

## 2024-04-18 PROCEDURE — 83605 ASSAY OF LACTIC ACID: CPT | Performed by: PHYSICIAN ASSISTANT

## 2024-04-18 PROCEDURE — 80053 COMPREHEN METABOLIC PANEL: CPT | Performed by: PHYSICIAN ASSISTANT

## 2024-04-18 PROCEDURE — 70450 CT HEAD/BRAIN W/O DYE: CPT

## 2024-04-18 PROCEDURE — 25010000002 HEPARIN (PORCINE) PER 1000 UNITS: Performed by: NURSE PRACTITIONER

## 2024-04-18 PROCEDURE — 36415 COLL VENOUS BLD VENIPUNCTURE: CPT | Performed by: PHYSICIAN ASSISTANT

## 2024-04-18 PROCEDURE — 25010000002 SODIUM CHLORIDE 0.9 % WITH KCL 20 MEQ 20-0.9 MEQ/L-% SOLUTION: Performed by: PHYSICIAN ASSISTANT

## 2024-04-18 PROCEDURE — 25010000002 PIPERACILLIN SOD-TAZOBACTAM PER 1 G: Performed by: PHYSICIAN ASSISTANT

## 2024-04-18 PROCEDURE — 84484 ASSAY OF TROPONIN QUANT: CPT | Performed by: PHYSICIAN ASSISTANT

## 2024-04-18 PROCEDURE — 83735 ASSAY OF MAGNESIUM: CPT | Performed by: PHYSICIAN ASSISTANT

## 2024-04-18 PROCEDURE — 25010000002 ONDANSETRON PER 1 MG: Performed by: PHYSICIAN ASSISTANT

## 2024-04-18 PROCEDURE — 83930 ASSAY OF BLOOD OSMOLALITY: CPT | Performed by: PHYSICIAN ASSISTANT

## 2024-04-18 PROCEDURE — 25010000002 ONDANSETRON PER 1 MG: Performed by: NURSE PRACTITIONER

## 2024-04-18 PROCEDURE — 83690 ASSAY OF LIPASE: CPT | Performed by: PHYSICIAN ASSISTANT

## 2024-04-18 PROCEDURE — 82947 ASSAY GLUCOSE BLOOD QUANT: CPT | Performed by: INTERNAL MEDICINE

## 2024-04-18 PROCEDURE — 84100 ASSAY OF PHOSPHORUS: CPT | Performed by: PHYSICIAN ASSISTANT

## 2024-04-18 PROCEDURE — 87636 SARSCOV2 & INF A&B AMP PRB: CPT | Performed by: PHYSICIAN ASSISTANT

## 2024-04-18 PROCEDURE — 93005 ELECTROCARDIOGRAM TRACING: CPT | Performed by: PHYSICIAN ASSISTANT

## 2024-04-18 PROCEDURE — 87040 BLOOD CULTURE FOR BACTERIA: CPT | Performed by: PHYSICIAN ASSISTANT

## 2024-04-18 PROCEDURE — 82803 BLOOD GASES ANY COMBINATION: CPT | Performed by: PHYSICIAN ASSISTANT

## 2024-04-18 PROCEDURE — 82009 KETONE BODYS QUAL: CPT | Performed by: PHYSICIAN ASSISTANT

## 2024-04-18 PROCEDURE — 87641 MR-STAPH DNA AMP PROBE: CPT | Performed by: NURSE PRACTITIONER

## 2024-04-18 PROCEDURE — 71045 X-RAY EXAM CHEST 1 VIEW: CPT

## 2024-04-18 PROCEDURE — 85025 COMPLETE CBC W/AUTO DIFF WBC: CPT | Performed by: PHYSICIAN ASSISTANT

## 2024-04-18 PROCEDURE — 25810000003 SODIUM CHLORIDE 0.9 % SOLUTION: Performed by: NURSE PRACTITIONER

## 2024-04-18 PROCEDURE — 74176 CT ABD & PELVIS W/O CONTRAST: CPT

## 2024-04-18 PROCEDURE — 99291 CRITICAL CARE FIRST HOUR: CPT

## 2024-04-18 PROCEDURE — 25810000003 SODIUM CHLORIDE 0.9 % SOLUTION: Performed by: PHYSICIAN ASSISTANT

## 2024-04-18 PROCEDURE — 83036 HEMOGLOBIN GLYCOSYLATED A1C: CPT | Performed by: PHYSICIAN ASSISTANT

## 2024-04-18 PROCEDURE — 25010000002 POTASSIUM CHLORIDE PER 2 MEQ: Performed by: PHYSICIAN ASSISTANT

## 2024-04-18 PROCEDURE — 84100 ASSAY OF PHOSPHORUS: CPT | Performed by: NURSE PRACTITIONER

## 2024-04-18 RX ORDER — NICOTINE POLACRILEX 4 MG
15 LOZENGE BUCCAL
Status: DISCONTINUED | OUTPATIENT
Start: 2024-04-18 | End: 2024-04-19

## 2024-04-18 RX ORDER — BISACODYL 10 MG
10 SUPPOSITORY, RECTAL RECTAL DAILY PRN
Status: DISCONTINUED | OUTPATIENT
Start: 2024-04-18 | End: 2024-04-22 | Stop reason: HOSPADM

## 2024-04-18 RX ORDER — FAMOTIDINE 10 MG/ML
20 INJECTION, SOLUTION INTRAVENOUS DAILY
Status: DISCONTINUED | OUTPATIENT
Start: 2024-04-18 | End: 2024-04-22 | Stop reason: HOSPADM

## 2024-04-18 RX ORDER — CLONAZEPAM 1 MG/1
1 TABLET ORAL DAILY PRN
Status: DISCONTINUED | OUTPATIENT
Start: 2024-04-18 | End: 2024-04-22 | Stop reason: HOSPADM

## 2024-04-18 RX ORDER — ONDANSETRON 4 MG/1
4 TABLET, ORALLY DISINTEGRATING ORAL EVERY 6 HOURS PRN
Status: DISCONTINUED | OUTPATIENT
Start: 2024-04-18 | End: 2024-04-22 | Stop reason: HOSPADM

## 2024-04-18 RX ORDER — SODIUM CHLORIDE 9 MG/ML
40 INJECTION, SOLUTION INTRAVENOUS AS NEEDED
Status: DISCONTINUED | OUTPATIENT
Start: 2024-04-18 | End: 2024-04-20

## 2024-04-18 RX ORDER — OXCARBAZEPINE 600 MG/1
300 TABLET, FILM COATED ORAL 3 TIMES DAILY
Status: DISCONTINUED | OUTPATIENT
Start: 2024-04-18 | End: 2024-04-22 | Stop reason: HOSPADM

## 2024-04-18 RX ORDER — SODIUM CHLORIDE AND POTASSIUM CHLORIDE 150; 900 MG/100ML; MG/100ML
250 INJECTION, SOLUTION INTRAVENOUS CONTINUOUS PRN
Status: DISCONTINUED | OUTPATIENT
Start: 2024-04-18 | End: 2024-04-20

## 2024-04-18 RX ORDER — DEXTROSE MONOHYDRATE, SODIUM CHLORIDE, AND POTASSIUM CHLORIDE 50; 2.98; 9 G/1000ML; G/1000ML; G/1000ML
150 INJECTION, SOLUTION INTRAVENOUS CONTINUOUS PRN
Status: DISCONTINUED | OUTPATIENT
Start: 2024-04-18 | End: 2024-04-20

## 2024-04-18 RX ORDER — GABAPENTIN 300 MG/1
600 CAPSULE ORAL EVERY 8 HOURS SCHEDULED
Status: DISCONTINUED | OUTPATIENT
Start: 2024-04-18 | End: 2024-04-22 | Stop reason: HOSPADM

## 2024-04-18 RX ORDER — AMOXICILLIN 250 MG
2 CAPSULE ORAL 2 TIMES DAILY PRN
Status: DISCONTINUED | OUTPATIENT
Start: 2024-04-18 | End: 2024-04-22 | Stop reason: HOSPADM

## 2024-04-18 RX ORDER — CITALOPRAM 20 MG/1
20 TABLET ORAL DAILY
Status: DISCONTINUED | OUTPATIENT
Start: 2024-04-18 | End: 2024-04-22 | Stop reason: HOSPADM

## 2024-04-18 RX ORDER — SODIUM CHLORIDE AND POTASSIUM CHLORIDE 300; 900 MG/100ML; MG/100ML
250 INJECTION, SOLUTION INTRAVENOUS CONTINUOUS PRN
Status: DISCONTINUED | OUTPATIENT
Start: 2024-04-18 | End: 2024-04-20

## 2024-04-18 RX ORDER — IBUPROFEN 600 MG/1
1 TABLET ORAL
Status: DISCONTINUED | OUTPATIENT
Start: 2024-04-18 | End: 2024-04-19

## 2024-04-18 RX ORDER — ONDANSETRON 2 MG/ML
4 INJECTION INTRAMUSCULAR; INTRAVENOUS EVERY 6 HOURS PRN
Status: DISCONTINUED | OUTPATIENT
Start: 2024-04-18 | End: 2024-04-22 | Stop reason: HOSPADM

## 2024-04-18 RX ORDER — DEXTROSE MONOHYDRATE, SODIUM CHLORIDE, AND POTASSIUM CHLORIDE 50; 1.49; 9 G/1000ML; G/1000ML; G/1000ML
150 INJECTION, SOLUTION INTRAVENOUS CONTINUOUS PRN
Status: DISCONTINUED | OUTPATIENT
Start: 2024-04-18 | End: 2024-04-20

## 2024-04-18 RX ORDER — ONDANSETRON 2 MG/ML
4 INJECTION INTRAMUSCULAR; INTRAVENOUS ONCE
Status: COMPLETED | OUTPATIENT
Start: 2024-04-18 | End: 2024-04-18

## 2024-04-18 RX ORDER — SODIUM CHLORIDE 450 MG/100ML
250 INJECTION, SOLUTION INTRAVENOUS CONTINUOUS PRN
Status: DISCONTINUED | OUTPATIENT
Start: 2024-04-18 | End: 2024-04-20

## 2024-04-18 RX ORDER — DEXTROSE AND SODIUM CHLORIDE 5; .9 G/100ML; G/100ML
150 INJECTION, SOLUTION INTRAVENOUS CONTINUOUS PRN
Status: DISCONTINUED | OUTPATIENT
Start: 2024-04-18 | End: 2024-04-20

## 2024-04-18 RX ORDER — SODIUM CHLORIDE 0.9 % (FLUSH) 0.9 %
10 SYRINGE (ML) INJECTION EVERY 12 HOURS SCHEDULED
Status: DISCONTINUED | OUTPATIENT
Start: 2024-04-18 | End: 2024-04-20

## 2024-04-18 RX ORDER — ACETAMINOPHEN 650 MG/1
650 SUPPOSITORY RECTAL EVERY 4 HOURS PRN
Status: DISCONTINUED | OUTPATIENT
Start: 2024-04-18 | End: 2024-04-22 | Stop reason: HOSPADM

## 2024-04-18 RX ORDER — DEXTROSE AND SODIUM CHLORIDE 5; .45 G/100ML; G/100ML
150 INJECTION, SOLUTION INTRAVENOUS CONTINUOUS PRN
Status: DISCONTINUED | OUTPATIENT
Start: 2024-04-18 | End: 2024-04-20

## 2024-04-18 RX ORDER — DEXTROSE MONOHYDRATE, SODIUM CHLORIDE, AND POTASSIUM CHLORIDE 50; 1.49; 4.5 G/1000ML; G/1000ML; G/1000ML
150 INJECTION, SOLUTION INTRAVENOUS CONTINUOUS PRN
Status: DISCONTINUED | OUTPATIENT
Start: 2024-04-18 | End: 2024-04-20

## 2024-04-18 RX ORDER — ALUMINA, MAGNESIA, AND SIMETHICONE 2400; 2400; 240 MG/30ML; MG/30ML; MG/30ML
15 SUSPENSION ORAL EVERY 6 HOURS PRN
Status: DISCONTINUED | OUTPATIENT
Start: 2024-04-18 | End: 2024-04-22 | Stop reason: HOSPADM

## 2024-04-18 RX ORDER — SODIUM CHLORIDE 0.9 % (FLUSH) 0.9 %
10 SYRINGE (ML) INJECTION AS NEEDED
Status: DISCONTINUED | OUTPATIENT
Start: 2024-04-18 | End: 2024-04-22 | Stop reason: HOSPADM

## 2024-04-18 RX ORDER — HEPARIN SODIUM 5000 [USP'U]/ML
5000 INJECTION, SOLUTION INTRAVENOUS; SUBCUTANEOUS EVERY 8 HOURS SCHEDULED
Status: DISCONTINUED | OUTPATIENT
Start: 2024-04-18 | End: 2024-04-22 | Stop reason: HOSPADM

## 2024-04-18 RX ORDER — DEXTROSE MONOHYDRATE 25 G/50ML
10-50 INJECTION, SOLUTION INTRAVENOUS
Status: DISCONTINUED | OUTPATIENT
Start: 2024-04-18 | End: 2024-04-19 | Stop reason: SDUPTHER

## 2024-04-18 RX ORDER — TIZANIDINE 4 MG/1
1 TABLET ORAL EVERY 8 HOURS PRN
COMMUNITY

## 2024-04-18 RX ORDER — DEXTROSE MONOHYDRATE, SODIUM CHLORIDE, AND POTASSIUM CHLORIDE 50; 2.98; 4.5 G/1000ML; G/1000ML; G/1000ML
150 INJECTION, SOLUTION INTRAVENOUS CONTINUOUS PRN
Status: DISCONTINUED | OUTPATIENT
Start: 2024-04-18 | End: 2024-04-20

## 2024-04-18 RX ORDER — NITROGLYCERIN 0.4 MG/1
0.4 TABLET SUBLINGUAL
Status: DISCONTINUED | OUTPATIENT
Start: 2024-04-18 | End: 2024-04-22 | Stop reason: HOSPADM

## 2024-04-18 RX ORDER — SODIUM CHLORIDE AND POTASSIUM CHLORIDE 150; 450 MG/100ML; MG/100ML
250 INJECTION, SOLUTION INTRAVENOUS CONTINUOUS PRN
Status: DISCONTINUED | OUTPATIENT
Start: 2024-04-18 | End: 2024-04-20

## 2024-04-18 RX ORDER — BISACODYL 5 MG/1
5 TABLET, DELAYED RELEASE ORAL DAILY PRN
Status: DISCONTINUED | OUTPATIENT
Start: 2024-04-18 | End: 2024-04-22 | Stop reason: HOSPADM

## 2024-04-18 RX ORDER — SODIUM CHLORIDE 0.9 % (FLUSH) 0.9 %
10 SYRINGE (ML) INJECTION AS NEEDED
Status: DISCONTINUED | OUTPATIENT
Start: 2024-04-18 | End: 2024-04-20

## 2024-04-18 RX ORDER — POLYETHYLENE GLYCOL 3350 17 G/17G
17 POWDER, FOR SOLUTION ORAL DAILY PRN
Status: DISCONTINUED | OUTPATIENT
Start: 2024-04-18 | End: 2024-04-22 | Stop reason: HOSPADM

## 2024-04-18 RX ORDER — ACETAMINOPHEN 325 MG/1
650 TABLET ORAL EVERY 4 HOURS PRN
Status: DISCONTINUED | OUTPATIENT
Start: 2024-04-18 | End: 2024-04-22 | Stop reason: HOSPADM

## 2024-04-18 RX ORDER — SODIUM CHLORIDE 9 MG/ML
250 INJECTION, SOLUTION INTRAVENOUS CONTINUOUS PRN
Status: DISCONTINUED | OUTPATIENT
Start: 2024-04-18 | End: 2024-04-20

## 2024-04-18 RX ADMIN — SODIUM CHLORIDE 1000 ML: 9 INJECTION, SOLUTION INTRAVENOUS at 10:44

## 2024-04-18 RX ADMIN — PIPERACILLIN AND TAZOBACTAM 3.38 G: 3; .375 INJECTION, POWDER, FOR SOLUTION INTRAVENOUS at 11:34

## 2024-04-18 RX ADMIN — POTASSIUM CHLORIDE, DEXTROSE MONOHYDRATE AND SODIUM CHLORIDE 150 ML/HR: 150; 5; 450 INJECTION, SOLUTION INTRAVENOUS at 16:46

## 2024-04-18 RX ADMIN — SODIUM CHLORIDE 1000 ML: 9 INJECTION, SOLUTION INTRAVENOUS at 16:16

## 2024-04-18 RX ADMIN — Medication 10 ML: at 21:46

## 2024-04-18 RX ADMIN — HEPARIN SODIUM 5000 UNITS: 5000 INJECTION INTRAVENOUS; SUBCUTANEOUS at 13:49

## 2024-04-18 RX ADMIN — SODIUM CHLORIDE AND POTASSIUM CHLORIDE 250 ML/HR: .9; .15 SOLUTION INTRAVENOUS at 13:46

## 2024-04-18 RX ADMIN — ONDANSETRON 4 MG: 2 INJECTION INTRAMUSCULAR; INTRAVENOUS at 10:43

## 2024-04-18 RX ADMIN — HEPARIN SODIUM 5000 UNITS: 5000 INJECTION INTRAVENOUS; SUBCUTANEOUS at 21:46

## 2024-04-18 RX ADMIN — SODIUM CHLORIDE 1000 ML/HR: 9 INJECTION, SOLUTION INTRAVENOUS at 11:33

## 2024-04-18 RX ADMIN — INSULIN HUMAN 5.4 UNITS/HR: 1 INJECTION, SOLUTION INTRAVENOUS at 11:52

## 2024-04-18 RX ADMIN — POTASSIUM CHLORIDE AND SODIUM CHLORIDE 250 ML/HR: 450; 150 INJECTION, SOLUTION INTRAVENOUS at 15:24

## 2024-04-18 RX ADMIN — POTASSIUM CHLORIDE, DEXTROSE MONOHYDRATE AND SODIUM CHLORIDE 150 ML/HR: 150; 5; 900 INJECTION, SOLUTION INTRAVENOUS at 21:46

## 2024-04-18 RX ADMIN — FAMOTIDINE 20 MG: 10 INJECTION INTRAVENOUS at 13:46

## 2024-04-18 RX ADMIN — Medication 10 ML: at 13:52

## 2024-04-18 RX ADMIN — ONDANSETRON 4 MG: 2 INJECTION INTRAMUSCULAR; INTRAVENOUS at 14:38

## 2024-04-18 NOTE — H&P
Critical Care History and Physical     Karlycisco Zayas : 1970 MRN:[id number] LOS:0 ROOM:      Reason for admission: DKA, type 1     Assessment / Plan     Diabetic ketoacidosis without coma, with history of diabetes mellitus, type 1  Etiology likely due to pump malfunction, recently switched to a new pump per spouse report.  CT of the abdomen pelvis was without intra-abdominal abnormalities, no overt signs of infection.  Anion gap of 38 on admission.  A1c 9.10, Acetone-large.  Leukocytosis without bandemia, likely reactive response to DKA.  DKA protocol initiated.  Insulin drip initiated per DKA protocol.  Adjust iv fluids based on glucose, sodium, and potassium per protocol.  Accuchecks every hour and serial labs as ordered.  Continue protocol until resolved per protocol recommendations.  Remove insulin pump and needle.  Upon resolution, will consult endocrinology, followed by Dr. Patton as outpatient.    Toxic metabolic encephalopathy  Hold all mental status altering medications.  Ct Head without acute intracranial abnormalities.  Continue to correct electrolytes and continue IVF.    Further workup as clinically indicated.    Diabetic neuropathy: Hold gabapentin, Trileptal  Insomnia: Hold Pamelor.  Anxiety/Depression: Hold Celexa, Klonopin.      Code Status (Patient has no pulse and is not breathing): CPR (Attempt to Resuscitate)  Medical Interventions (Patient has pulse or is breathing): Full Support       Nutrition:   NPO Diet NPO Type: Strict NPO     DVT prophylaxis:  Medical DVT prophylaxis orders are present.         History of Present illness     Karly Zayas is a 53 y.o. female with PMH of diabetes mellitus type 1, diabetic neuropathy, anxiety/depression, and presented to the hospital for hyperglycemia, and was admitted with a principal diagnosis of DKA, type 1.  The following has been culminated from review of patient's chart, discussion with provider, and with patient's spouse, as  patient is with altered mental status.  Patient had been having issues with her blood glucose, in which she had been giving herself insulin attempting to contain the problem.  She recently started a new insulin pump and apparently has been having issues with this device.  Patient was starting to also develop some increasing confusion.  There were no reported fevers or chills, dysuria, but patient does have generalized abdominal pain, nausea, and vomiting.    In the ED, patient had a venous blood gas with pH 7.117, pCO2 18.8, HCO3 6.1.  Labs were obtained with the following abnormalities: Troponin 32 with reflex of 27, sodium 124, chloride 81, CO2 5, anion gap 38, BUN 54, creatinine 2.08, glucose 681, magnesium 2.9, phosphorus 5.4, alk phos 147, A1c 9.1, acetone large, lipase 292, osmolality 339, WBC 23.81, platelets 750.  Blood cultures were obtained.  Patient was determined to be in DKA, and was given IV fluid bolus as well as initiation of insulin drip per DKA protocol.  CT of the head was obtained due to patient's altered mental status and was without acute intracranial abnormalities.  Additionally due to patient's inability to provide good historical information and reported abdominal pain, obtained a CT of the abdomen pelvis that was without abnormalities of the abdomen or pelvis.  Intensivist service was consulted to admit patient for further treatment and evaluation.    ACP: Patient's spouse would be decision maker in the event that patient is unable to make her own decision.    Patient was seen and examined on 04/18/24 at 12:10 EDT .    Subjective / Review of systems     Review of Systems   Unable to perform ROS: Mental status change        Past Medical/Surgical/Social/Family History & Allergies     Past Medical History:   Diagnosis Date    Anxiety     Autonomic neuropathy associated with type 1 diabetes mellitus 2022    Bipolar 2 disorder     Cataracts, bilateral     Diabetes mellitus     TYPE 1    Diabetes  mellitus type I     Diabetic retinopathy associated with type 1 diabetes mellitus     Disease of thyroid gland     DKA (diabetic ketoacidoses)     Foot fracture, right     High cholesterol     Kidney stone     Macular degeneration, bilateral     Neuropathy     Pelvic pain       Past Surgical History:   Procedure Laterality Date    ANKLE SURGERY Right 2015     SECTION      COLONOSCOPY N/A 2023    Procedure: COLONOSCOPY;  Surgeon: Lori Cleary MD;  Location: [facility] ENDOSCOPY;  Service: Gastroenterology;  Laterality: N/A;    DIAGNOSTIC LAPAROSCOPY      DIAGNOSTIC LAPAROSCOPY N/A 2017    Procedure: LAPAROSCOPY, PARTIAL LEFT SALPINGECTOMY;  Surgeon: Arleen Quintana MD;  Location: [facility];  Service:     DILATATION AND CURETTAGE N/A 7/10/2018    Procedure: FROZEN DILATATION AND CURETTAGE POWER MORECELLATOR;  Surgeon: Nicole Shannon MD;  Location: [facility] OR;  Service: Gynecology    HYSTERECTOMY SUPRACERVICAL N/A 7/10/2018    Procedure: LAPAROSCOPIC SUPRACERVICAL HYSTERECTOMY WITH MORCELLATOR, LEFT SALPINGECTOMY;  Surgeon: Nicole Shannon MD;  Location: [facility] OR;  Service: Gynecology    HYSTEROSCOPY ENDOMETRIAL ABLATION      WITH TUBAL AND RIGHT OVARY REMOVAL    TONSILLECTOMY        Social History     Socioeconomic History    Marital status:    Tobacco Use    Smoking status: Former     Current packs/day: 0.00     Types: Cigarettes     Start date: 1992     Quit date: 2017     Years since quittin.7     Passive exposure: Past    Smokeless tobacco: Former    Tobacco comments:     Currently using vaping   Vaping Use    Vaping status: Some Days    Substances: Nicotine, Flavoring    Devices: Pre-filled or refillable cartridge   Substance and Sexual Activity    Alcohol use: No    Drug use: No    Sexual activity: Defer      Family History   Problem Relation Age of Onset    Malig Hyperthermia Neg Hx       Allergies   Allergen Reactions    Prednisone  Swelling     high fever/ body aches    Nitrofurantoin Monohyd Macro Other (See Comments)     severe reaction- hospitalized      Social Determinants of Health     Tobacco Use: Medium Risk (3/15/2024)    Patient History     Smoking Tobacco Use: Former     Smokeless Tobacco Use: Former     Passive Exposure: Past   Alcohol Use: Not on file   Financial Resource Strain: Not on file   Food Insecurity: Not on file   Transportation Needs: Not on file   Physical Activity: Not on file   Stress: Not on file   Social Connections: Unknown (10/10/2023)    Family and Community Support     Help with Day-to-Day Activities: Not on file     Lonely or Isolated: Not on file   Interpersonal Safety: Not At Risk (4/18/2024)    Abuse Screen     Unsafe at Home or Work/School: no     Feels Threatened by Someone?: no     Does Anyone Keep You from Contacting Others or Doint Things Outside the Home?: no     Physical Sign of Abuse Present: no   Depression: Not on file   Housing Stability: Unknown (10/10/2023)    Housing Stability     Current Living Arrangements: Not on file     Potentially Unsafe Housing Conditions: Not on file   Utilities: Not on file   Health Literacy: Unknown (10/10/2023)    Education     Help with school or training?: Not on file     Preferred Language: Not on file   Employment: Unknown (10/10/2023)    Employment     Do you want help finding or keeping work or a job?: Not on file   Disabilities: Unknown (10/10/2023)    Disabilities     Concentrating, Remembering, or Making Decisions Difficulty: Not on file     Doing Errands Independently Difficulty: Not on file        Home Medications     Prior to Admission medications    Medication Sig Start Date End Date Taking? Authorizing Provider   acetone, urine, test strip Use to check for ketones prn 9/26/22   Nai Patton MD   albuterol sulfate  (90 Base) MCG/ACT inhaler Inhale 2 puffs. 3/13/22   Provider, MD Daisy   atorvastatin (LIPITOR) 40 MG tablet TAKE 1 TABLET  "BY MOUTH EVERY DAY  8/30/21   Nai Patton MD   citalopram (CeleXA) 20 MG tablet  1/31/23   Daisy Cullen MD   clonazePAM (KlonoPIN) 1 MG tablet Take 1 tablet by mouth 2 (Two) Times a Day As Needed for Anxiety.    Daisy Cullen MD   Continuous Blood Gluc Sensor (Dexcom G6 Sensor) Every 10 (Ten) Days. 6/6/23   Nai Patton MD   Continuous Blood Gluc Transmit (Dexcom G6 Transmitter) misc 1 each Every 3 (Three) Months. 6/6/23   Nai Patton MD   famotidine (PEPCID) 40 MG tablet Take 1 tablet by mouth.    Daisy Cullen MD   gabapentin (NEURONTIN) 300 MG capsule TAKE 1 CAPSULE BY MOUTH THREE TIMES A DAY, max 3 per day 12/28/21   Daisy Cullen MD   Glucagon rDNA, (Glucagon Emergency) 1 MG kit Use prn for severe low blood sugar 9/26/22   Nai Patton MD   HYDROcodone-acetaminophen (NORCO)  MG per tablet Take 1 tablet by mouth Every 6 (Six) Hours As Needed. 3/1/24   Daisy Cullen MD   Insulin Glargine (BASAGLAR KWIKPEN) 100 UNIT/ML injection pen Inject 19 units once daily. Use in case of pump failure. 9/26/22   Nai Patton MD   Insulin Lispro (humaLOG) 100 UNIT/ML injection USE WITH INSULIN PUMP WITH A MAX DAILY DOSE  UNITS 3/22/24   Nai Patton MD   Insulin Syringe-Needle U-100 30G X 5/16\" 0.5 ML [facility] Use for insulin injections. 3x/d. Use in case of pump failure 9/26/22   Nai Patton MD   nortriptyline (PAMELOR) 10 MG capsule Take 1 capsule by mouth Every Night. 2/15/24   Daisy Cullen MD   ondansetron ODT (ZOFRAN-ODT) 4 MG disintegrating tablet  1/19/22   Daisy Cullen MD   OXcarbazepine (TRILEPTAL) 300 MG tablet  1/31/23   Daisy Cullen MD   oxyCODONE-acetaminophen (PERCOCET) 5-325 MG per tablet Take 1-2 tablets by mouth Every 6 (Six) Hours As Needed (pain). 6/20/23   Yair Tse MD   SUMAtriptan (IMITREX) 50 MG tablet  4/5/23   Provider, MD Daisy   vitamin D (ERGOCALCIFEROL) 1.25 MG (57784 " UT) capsule capsule Take 1 capsule by mouth 1 (One) Time Per Week. 8/3/22   Nai Patton MD        Objective / Physical Exam     Vital signs:  Temp: 98.7 °F (37.1 °C)  BP: 156/81  Heart Rate: 119  Resp: 18  SpO2: 100 %  Weight: 88.5 kg (195 lb 1.7 oz)    Admission Weight: Weight: 88.5 kg (195 lb 1.7 oz)    Physical Exam  Vitals and nursing note reviewed.   Constitutional:       General: She is in acute distress.      Appearance: Normal appearance. She is ill-appearing and toxic-appearing. She is not diaphoretic.   HENT:      Head: Normocephalic and atraumatic.      Nose: Nose normal. No congestion or rhinorrhea.      Mouth/Throat:      Mouth: Mucous membranes are dry.      Pharynx: Oropharynx is clear. No oropharyngeal exudate or posterior oropharyngeal erythema.   Eyes:      General: No scleral icterus.     Extraocular Movements: Extraocular movements intact.      Conjunctiva/sclera: Conjunctivae normal.      Pupils: Pupils are equal, round, and reactive to light.   Cardiovascular:      Rate and Rhythm: Regular rhythm. Tachycardia present.      Pulses: Normal pulses.      Heart sounds: Normal heart sounds. No murmur heard.     No gallop.   Pulmonary:      Effort: Pulmonary effort is normal.      Comments: Clear breath sounds throughout, tachypneic but without accessory muscle use.  Abdominal:      General: Bowel sounds are normal. There is no distension.      Palpations: Abdomen is soft.      Tenderness: There is abdominal tenderness. There is no guarding or rebound.   Musculoskeletal:         General: No tenderness.      Cervical back: Normal range of motion and neck supple. No rigidity.      Right lower leg: No edema.      Left lower leg: No edema.   Skin:     General: Skin is warm and dry.      Findings: No rash.   Neurological:      General: No focal deficit present.      Mental Status: She is easily aroused. She is lethargic.      Comments: Oriented to person, lethargic, easily arousable, no obvious  focal deficit noted.  Spontaneously moving extremities.   Psychiatric:      Comments: Lethargic, drowsy, cooperative.          Labs     Results from last 7 days   Lab Units 04/18/24  1030   WBC 10*3/mm3 23.81*   HEMATOCRIT % 45.4   PLATELETS 10*3/mm3 750*      Results from last 7 days   Lab Units 04/18/24  1030   SODIUM mmol/L 124*   POTASSIUM mmol/L 4.8   CHLORIDE mmol/L 81*   CO2 mmol/L 5.0*   BUN mg/dL 54*   CREATININE mg/dL 2.08*        Imaging     CT Abdomen Pelvis Without Contrast    Result Date: 4/18/2024  Impression: Limited exam due to respiratory artifact No definite acute CT abnormalities in the abdomen or pelvis Electronically Signed: Ben Vallejo MD  4/18/2024 1:15 PM EDT  Workstation ID: [id number]    CT Head Without Contrast    Result Date: 4/18/2024  Impression: Limited exam due to motion artifact No definite acute intracranial abnormality. A follow-up exam is advised if symptoms persist Electronically Signed: Ben Vallejo MD  4/18/2024 12:39 PM EDT  Workstation ID: [id number]    XR Chest 1 View    Result Date: 4/18/2024  Impression: No acute process identified Electronically Signed: Hardeep David MD  4/18/2024 11:52 AM EDT  Workstation ID: [id number]     EKG: My independent evaluation showed Sinus tachycardia without ST or T wave abnormalities, heart rate 121, QTc 440.    Current Medications     Scheduled Meds:  famotidine, 20 mg, Intravenous, Daily  heparin (porcine), 5,000 Units, Subcutaneous, Q8H  sodium chloride, 10 mL, Intravenous, Q12H         Continuous Infusions:  dextrose 5 % and sodium chloride 0.45 %, 150 mL/hr  dextrose 5 % and sodium chloride 0.45 % with KCl 20 mEq/L, 150 mL/hr  dextrose 5 % and sodium chloride 0.45 % with KCl 40 mEq/L, 150 mL/hr  dextrose 5 % and sodium chloride 0.9 %, 150 mL/hr  dextrose 5 % and sodium chloride 0.9 % with KCl 20 mEq, 150 mL/hr  dextrose 5% and sodium chloride 0.9% with KCl 40 mEq/L, 150 mL/hr  insulin, 0-100 Units/hr, Last Rate: 5.4 Units/hr (04/18/24  1152)  sodium chloride 0.45 % 1,000 mL with potassium chloride 40 mEq infusion, 250 mL/hr  sodium chloride, 250 mL/hr  sodium chloride 0.45 % with KCl 20 mEq, 250 mL/hr  sodium chloride, 1,000 mL/hr, Last Rate: 1,000 mL/hr (04/18/24 1133)  sodium chloride, 250 mL/hr  sodium chloride 0.9 % with KCl 20 mEq, 250 mL/hr  sodium chloride 0.9 % with KCl 40 mEq/L, 250 mL/hr       Plan discussed with RN. Reviewed all other data in the last 24 hours, including but not limited to vitals, labs, microbiology, imaging and pertinent notes from other providers.  Plan also discussed with patient and her family at the bedside.      BEV Ibrahim   Critical Care  04/18/24   12:10 EDT

## 2024-04-18 NOTE — ED PROVIDER NOTES
Subjective   History of Present Illness  Pt is a 54yo WF with PMH of T1DM presenting to ED for hyperglycemia. Pt states she noticed her blood sugar was high last night, gave herself insulin which brought it down. She woke up this morning with it high again. Pt unable to give readings as she is very out of it. Pt admits to having generalized abdominal pain, nausea, and vomiting.  Patient's  at bedside reports no fever or urinary complaints.  History somewhat limited due to patient's mental status change.  He reports she seemed somewhat more alert last night when her sugars were improved but has continued to have altered mental status today.  No reports of head injury or falls.    History provided by:  Patient      Review of Systems   Unable to perform ROS: Mental status change       Past Medical History:   Diagnosis Date    Anxiety     Autonomic neuropathy associated with type 1 diabetes mellitus     Bipolar 2 disorder     Cataracts, bilateral     Diabetes mellitus     TYPE 1    Diabetes mellitus type I     Diabetic retinopathy associated with type 1 diabetes mellitus     Disease of thyroid gland     DKA (diabetic ketoacidoses)     Foot fracture, right     High cholesterol     Kidney stone     Macular degeneration, bilateral     Neuropathy     Pelvic pain        Allergies   Allergen Reactions    Prednisone Swelling     high fever/ body aches    Nitrofurantoin Monohyd Macro Other (See Comments)     severe reaction- hospitalized       Past Surgical History:   Procedure Laterality Date    ANKLE SURGERY Right 2015     SECTION      COLONOSCOPY N/A 2023    Procedure: COLONOSCOPY;  Surgeon: Lori Cleary MD;  Location: [facility] ENDOSCOPY;  Service: Gastroenterology;  Laterality: N/A;    DIAGNOSTIC LAPAROSCOPY      DIAGNOSTIC LAPAROSCOPY N/A 2017    Procedure: LAPAROSCOPY, PARTIAL LEFT SALPINGECTOMY;  Surgeon: Arleen Quintana MD;  Location:  [facility] [place];  Service:      DILATATION AND CURETTAGE N/A 7/10/2018    Procedure: FROZEN DILATATION AND CURETTAGE POWER MORECELLATOR;  Surgeon: Nicole Shannon MD;  Location: [facility] OR;  Service: Gynecology    HYSTERECTOMY SUPRACERVICAL N/A 7/10/2018    Procedure: LAPAROSCOPIC SUPRACERVICAL HYSTERECTOMY WITH MORCELLATOR, LEFT SALPINGECTOMY;  Surgeon: Nicole Shannon MD;  Location: [facility];  Service: Gynecology    HYSTEROSCOPY ENDOMETRIAL ABLATION      WITH TUBAL AND RIGHT OVARY REMOVAL    TONSILLECTOMY  1983       Family History   Problem Relation Age of Onset    Malig Hyperthermia Neg Hx        Social History     Socioeconomic History    Marital status:    Tobacco Use    Smoking status: Former     Current packs/day: 0.00     Types: Cigarettes     Start date: 1992     Quit date: 2017     Years since quittin.7     Passive exposure: Past    Smokeless tobacco: Former    Tobacco comments:     Currently using vaping   Vaping Use    Vaping status: Some Days    Substances: Nicotine, Flavoring    Devices: Pre-filled or refillable cartridge   Substance and Sexual Activity    Alcohol use: No    Drug use: No    Sexual activity: Defer           Objective   Physical Exam  Vitals and nursing note reviewed.   Constitutional:       General: She is not in acute distress.     Appearance: Normal appearance. She is well-developed. She is obese. She is ill-appearing. She is not toxic-appearing or diaphoretic.   HENT:      Head: Normocephalic and atraumatic.      Mouth/Throat:      Mouth: Mucous membranes are moist.      Pharynx: Oropharynx is clear.   Eyes:      General: No scleral icterus.     Extraocular Movements: Extraocular movements intact.      Pupils: Pupils are equal, round, and reactive to light.   Cardiovascular:      Rate and Rhythm: Regular rhythm. Tachycardia present.      Pulses: Normal pulses.      Heart sounds: No murmur heard.     No friction rub. No gallop.   Pulmonary:      Effort: Pulmonary effort is normal. No  "respiratory distress.      Breath sounds: Normal breath sounds. No stridor. No wheezing, rhonchi or rales.   Chest:      Chest wall: No tenderness.   Abdominal:      General: Bowel sounds are normal. There is no distension. There are no signs of injury.      Palpations: Abdomen is soft.      Tenderness: There is generalized abdominal tenderness. There is no right CVA tenderness, left CVA tenderness, guarding or rebound.      Hernia: No hernia is present.   Skin:     General: Skin is warm.      Capillary Refill: Capillary refill takes less than 2 seconds.      Coloration: Skin is not cyanotic, jaundiced or pale.      Findings: No rash.   Neurological:      General: No focal deficit present.      Mental Status: She is alert.      Comments: Obtunded.  Will shake her head yes and no to some questions.  Is moving all extremities freely.   Psychiatric:         Mood and Affect: Mood normal.         Behavior: Behavior normal.         Procedures           ED Course      /62 (BP Location: Left arm, Patient Position: Lying)   Pulse 118   Temp 98.7 °F (37.1 °C) (Oral)   Resp 16   Ht 175.3 cm (69\")   Wt 88.5 kg (195 lb 1.7 oz)   LMP 06/06/2018   SpO2 97%   BMI 28.81 kg/m²   Medications   sodium chloride 0.9 % flush 10 mL (has no administration in time range)   piperacillin-tazobactam (ZOSYN) 3.375 g IVPB in 100 mL NS MBP (CD) (3.375 g Intravenous New Bag 4/18/24 1134)   sodium chloride 0.9 % flush 10 mL (has no administration in time range)   sodium chloride 0.9 % flush 10 mL (has no administration in time range)   sodium chloride 0.9 % infusion 40 mL (has no administration in time range)   dextrose (GLUTOSE) oral gel 15 g (has no administration in time range)   dextrose (D50W) (25 g/50 mL) IV injection 10-50 mL (has no administration in time range)   Glucagon (GLUCAGEN) injection 1 mg (has no administration in time range)   sodium chloride 0.9 % bolus (1,000 mL/hr Intravenous New Bag 4/18/24 1133)   sodium " chloride 0.9 % infusion (has no administration in time range)   sodium chloride 0.9 % with KCl 20 mEq/L infusion (has no administration in time range)   sodium chloride 0.9 % with KCl 40 mEq/L infusion (has no administration in time range)   dextrose 5 % and sodium chloride 0.9 % infusion (has no administration in time range)   dextrose 5 % and sodium chloride 0.9 % with KCl 20 mEq/L infusion (has no administration in time range)   dextrose 5 % and sodium chloride 0.9 % with KCl 40 mEq/L infusion (has no administration in time range)   sodium chloride 0.45 % infusion (has no administration in time range)   sodium chloride 0.45 % with KCl 20 mEq/L infusion (has no administration in time range)   sodium chloride 0.45 % 1,000 mL with potassium chloride 40 mEq infusion (has no administration in time range)   dextrose 5 % and sodium chloride 0.45 % infusion (has no administration in time range)   dextrose 5 % and sodium chloride 0.45 % with KCl 20 mEq/L infusion (has no administration in time range)   dextrose 5 % and sodium chloride 0.45 % with KCl 40 mEq/L infusion (has no administration in time range)   insulin regular 1 unit/mL in 0.9% sodium chloride (Glucommander) (has no administration in time range)   Potassium Replacement - Follow Nurse / BPA Driven Protocol (has no administration in time range)   Magnesium Standard Dose Replacement - Follow Nurse / BPA Driven Protocol (has no administration in time range)   Phosphorus Replacement - Follow Nurse / BPA Driven Protocol (has no administration in time range)   Calcium Replacement - Follow Nurse / BPA Driven Protocol (has no administration in time range)   sodium chloride 0.9 % bolus 1,000 mL (1,000 mL Intravenous New Bag 4/18/24 1044)   ondansetron (ZOFRAN) injection 4 mg (4 mg Intravenous Given 4/18/24 1043)     Labs Reviewed   COMPREHENSIVE METABOLIC PANEL - Abnormal; Notable for the following components:       Result Value    Glucose 681 (*)     BUN 54 (*)      Creatinine 2.08 (*)     Sodium 124 (*)     Chloride 81 (*)     CO2 5.0 (*)     Total Protein 8.6 (*)     Alkaline Phosphatase 147 (*)     BUN/Creatinine Ratio 26.0 (*)     Anion Gap 38.0 (*)     eGFR 28.0 (*)     All other components within normal limits    Narrative:     GFR Normal >60  Chronic Kidney Disease <60  Kidney Failure <15     LIPASE - Abnormal; Notable for the following components:    Lipase 292 (*)     All other components within normal limits   OSMOLALITY, SERUM - Abnormal; Notable for the following components:    Osmolality 339 (*)     All other components within normal limits   BLOOD GAS, VENOUS - Abnormal; Notable for the following components:    pH, Venous 7.117 (*)     pCO2, Venous 18.8 (*)     pO2, Venous 47.5 (*)     HCO3, Venous 6.1 (*)     Base Excess, Venous -21.1 (*)     CO2 Content 6.7 (*)     All other components within normal limits   CBC WITH AUTO DIFFERENTIAL - Abnormal; Notable for the following components:    WBC 23.81 (*)     MCHC 31.3 (*)     Platelets 750 (*)     Neutrophil % 86.6 (*)     Lymphocyte % 5.2 (*)     Eosinophil % 0.0 (*)     Immature Grans % 0.9 (*)     Neutrophils, Absolute 20.59 (*)     Monocytes, Absolute 1.67 (*)     Immature Grans, Absolute 0.21 (*)     All other components within normal limits   ACETONE - Abnormal; Notable for the following components:    Acetone Large (*)     All other components within normal limits   POCT GLUCOSE FINGERSTICK - Abnormal; Notable for the following components:    Glucose >600 (*)     All other components within normal limits   COVID-19 AND FLU A/B, NP SWAB IN TRANSPORT MEDIA 1 HR TAT - Normal    Narrative:     Fact sheet for providers: https://www.fda.gov/media/845184/download    Fact sheet for patients: https://www.fda.gov/media/425434/download    Test performed by PCR.   POC LACTATE - Normal   BLOOD CULTURE   BLOOD CULTURE   URINALYSIS W/ MICROSCOPIC IF INDICATED (NO CULTURE)   PHOSPHORUS   MAGNESIUM   HEMOGLOBIN A1C   TROPONIN    BASIC METABOLIC PANEL   BASIC METABOLIC PANEL   MAGNESIUM   MAGNESIUM   PHOSPHORUS   PHOSPHORUS   CBC AND DIFFERENTIAL    Narrative:     The following orders were created for panel order CBC & Differential.  Procedure                               Abnormality         Status                     ---------                               -----------         ------                     CBC Auto Differential[[id number]]        Abnormal            Final result                 Please view results for these tests on the individual orders.   KETONE BODIES SERUM    Narrative:     The following orders were created for panel order Ketone Bodies, Serum (Not performed at [facility]).  Procedure                               Abnormality         Status                     ---------                               -----------         ------                     Acetone[[id number]]                      Abnormal            Final result                 Please view results for these tests on the individual orders.     CT Head Without Contrast    (Results Pending)   CT Abdomen Pelvis Without Contrast    (Results Pending)   XR Chest 1 View    (Results Pending)                                            Medical Decision Making  Differentials: DKA, electrolyte abnormality, dehydration, arrhythmia, intra-abdominal infection, bowel obstruction     ;this list is not all inclusive and does not constitute the entirety of considered causes  EKG: Interpreted by myself and Dr Louis shows sinus tachycardia rate 121 otherwise normal EKG previous reviewed from 7/5/2022 showed sinus rhythm  Labs: as above   Radiology: Chest x-ray reviewed by myself and independently interpreted by Dr Louis   CT Head Without Contrast    (Results Pending)  CT Abdomen Pelvis Without Contrast    (Results Pending)  XR Chest 1 View    (Results Pending)    Disposition/Treatment:  Appropriate PPE was worn during exam and throughout all encounters with the patient.  When the ED IV  was placed and labs were obtained patient was placed on proper monitors presents to the ED with reports of elevated glucose and altered mental status that started at home yesterday.  EKG showed no acute STEMI labs and imaging were obtained.  Patient was found to be in DKA with Glucose 681 pH on VBG 7.117 bicarb 18.8.  Corrected sodium 133.  DKA protocol initiated.  Also found to have acute kidney injury with a BUN of 54 creatinine 2.08.  CBC showed a white count of 23.81 likely secondary to vomiting, POC lactic normal and blood cultures are pending will cover with Zosyn for possible abdominal source of infection.  Lipase also found mildly elevated at 292.  COVID and flu were negative.  Urinalysis pending.  CT head and abdomen and pelvis pending upon admission to ICU.  Spoke to RADHA Vazquez     The high probability of sudden, clinically significant deterioration in the patient's condition required the highest level of my preparedness to intervene urgently.  The services I provided to this patient were to treat and/or prevent clinically significant deterioration that could result in: death or increase blood loss . Services included the following: chart data review, reviewing nurses notes an/or old charts, documentation time, consultant collaboration regarding findings and treatment options, vital sign assessments and ordering, interpreting and reviewing diagnostic studies/lab test.  Aggregate critical care time was 65 minutes , which includes only time during which I was engaged in work directly related to the patient's care, as described above, whether at the bedside or elsewhere in the Emergency Department. It did not include time spent performing other reported procedures.    Amount and/or Complexity of Data Reviewed  Labs: ordered. Decision-making details documented in ED Course.  Radiology: ordered. Decision-making details documented in ED Course.    Risk  Prescription drug management.        Final diagnoses:    Diabetic ketoacidosis without coma associated with type 1 diabetes mellitus   TRINA (acute kidney injury)   Generalized abdominal pain       ED Disposition  ED Disposition       ED Disposition   Decision to Admit    Condition   --    Comment   Level of Care: Critical Care [6]   Admitting Physician: JESS BROWN [[id number]]   Attending Physician: JESS BROWN [[id number]]                 No follow-up provider specified.       Medication List      No changes were made to your prescriptions during this visit.            Shruti Villalta PA  04/18/24 2752

## 2024-04-18 NOTE — Clinical Note
Level of Care: Critical Care [6]   Admitting Physician: JESS BROWN [[id number]]   Attending Physician: JESS BROWN [[id number]]

## 2024-04-18 NOTE — PLAN OF CARE
Goal Outcome Evaluation:               Pt arrived to unit altered and not able to follow commands. Insulin drip conitnues to infuse, see mar for insulin and fluid titrations. Pt had glucose monitor on and was removed. Insulin pump arrived in pt belongings bag and not connected to the pt. Vss

## 2024-04-18 NOTE — CONSULTS
Diabetes Education    Patient Name:  Karly Zayas  YOB: 1970  MRN: [id number]  Admit Date:  4/18/2024      Consult received due to adm with DKA. Per nurse, education not appropriate today. Educator will attempt follow up on different day.       Electronically signed by:  Faby Moore RN  04/18/24 17:33 EDT

## 2024-04-19 ENCOUNTER — APPOINTMENT (OUTPATIENT)
Dept: CT IMAGING | Facility: HOSPITAL | Age: 54
DRG: 637 | End: 2024-04-19
Payer: COMMERCIAL

## 2024-04-19 ENCOUNTER — APPOINTMENT (OUTPATIENT)
Dept: MRI IMAGING | Facility: HOSPITAL | Age: 54
DRG: 637 | End: 2024-04-19
Payer: COMMERCIAL

## 2024-04-19 LAB
ALBUMIN SERPL-MCNC: 3.8 G/DL (ref 3.5–5.2)
ALBUMIN/GLOB SERPL: 1.2 G/DL
ALP SERPL-CCNC: 112 U/L (ref 39–117)
ALT SERPL W P-5'-P-CCNC: 9 U/L (ref 1–33)
ANION GAP SERPL CALCULATED.3IONS-SCNC: 13 MMOL/L (ref 5–15)
ANION GAP SERPL CALCULATED.3IONS-SCNC: 14 MMOL/L (ref 5–15)
ANION GAP SERPL CALCULATED.3IONS-SCNC: 15 MMOL/L (ref 5–15)
ANION GAP SERPL CALCULATED.3IONS-SCNC: 17 MMOL/L (ref 5–15)
AST SERPL-CCNC: 14 U/L (ref 1–32)
BASOPHILS # BLD AUTO: 0.02 10*3/MM3 (ref 0–0.2)
BASOPHILS NFR BLD AUTO: 0.1 % (ref 0–1.5)
BILIRUB SERPL-MCNC: 0.3 MG/DL (ref 0–1.2)
BUN SERPL-MCNC: 11 MG/DL (ref 6–20)
BUN SERPL-MCNC: 13 MG/DL (ref 6–20)
BUN SERPL-MCNC: 20 MG/DL (ref 6–20)
BUN SERPL-MCNC: 20 MG/DL (ref 6–20)
BUN SERPL-MCNC: 28 MG/DL (ref 6–20)
BUN SERPL-MCNC: 9 MG/DL (ref 6–20)
BUN/CREAT SERPL: 15 (ref 7–25)
BUN/CREAT SERPL: 15.7 (ref 7–25)
BUN/CREAT SERPL: 19.1 (ref 7–25)
BUN/CREAT SERPL: 22.5 (ref 7–25)
BUN/CREAT SERPL: 22.5 (ref 7–25)
BUN/CREAT SERPL: 29.5 (ref 7–25)
CALCIUM SPEC-SCNC: 9 MG/DL (ref 8.6–10.5)
CALCIUM SPEC-SCNC: 9.1 MG/DL (ref 8.6–10.5)
CALCIUM SPEC-SCNC: 9.2 MG/DL (ref 8.6–10.5)
CALCIUM SPEC-SCNC: 9.2 MG/DL (ref 8.6–10.5)
CALCIUM SPEC-SCNC: 9.3 MG/DL (ref 8.6–10.5)
CALCIUM SPEC-SCNC: 9.6 MG/DL (ref 8.6–10.5)
CHLORIDE SERPL-SCNC: 103 MMOL/L (ref 98–107)
CHLORIDE SERPL-SCNC: 104 MMOL/L (ref 98–107)
CHLORIDE SERPL-SCNC: 104 MMOL/L (ref 98–107)
CHLORIDE SERPL-SCNC: 107 MMOL/L (ref 98–107)
CHLORIDE SERPL-SCNC: 107 MMOL/L (ref 98–107)
CHLORIDE SERPL-SCNC: 108 MMOL/L (ref 98–107)
CHOLEST SERPL-MCNC: 221 MG/DL (ref 0–200)
CO2 SERPL-SCNC: 16 MMOL/L (ref 22–29)
CO2 SERPL-SCNC: 16 MMOL/L (ref 22–29)
CO2 SERPL-SCNC: 17 MMOL/L (ref 22–29)
CO2 SERPL-SCNC: 17 MMOL/L (ref 22–29)
CO2 SERPL-SCNC: 18 MMOL/L (ref 22–29)
CO2 SERPL-SCNC: 19 MMOL/L (ref 22–29)
CREAT SERPL-MCNC: 0.6 MG/DL (ref 0.57–1)
CREAT SERPL-MCNC: 0.68 MG/DL (ref 0.57–1)
CREAT SERPL-MCNC: 0.7 MG/DL (ref 0.57–1)
CREAT SERPL-MCNC: 0.89 MG/DL (ref 0.57–1)
CREAT SERPL-MCNC: 0.89 MG/DL (ref 0.57–1)
CREAT SERPL-MCNC: 0.95 MG/DL (ref 0.57–1)
DEPRECATED RDW RBC AUTO: 42.3 FL (ref 37–54)
EGFRCR SERPLBLD CKD-EPI 2021: 103.6 ML/MIN/1.73
EGFRCR SERPLBLD CKD-EPI 2021: 104.3 ML/MIN/1.73
EGFRCR SERPLBLD CKD-EPI 2021: 107.5 ML/MIN/1.73
EGFRCR SERPLBLD CKD-EPI 2021: 71.8 ML/MIN/1.73
EGFRCR SERPLBLD CKD-EPI 2021: 77.6 ML/MIN/1.73
EGFRCR SERPLBLD CKD-EPI 2021: 77.6 ML/MIN/1.73
EOSINOPHIL # BLD AUTO: 0 10*3/MM3 (ref 0–0.4)
EOSINOPHIL NFR BLD AUTO: 0 % (ref 0.3–6.2)
ERYTHROCYTE [DISTWIDTH] IN BLOOD BY AUTOMATED COUNT: 13.8 % (ref 12.3–15.4)
GLOBULIN UR ELPH-MCNC: 3.2 GM/DL
GLUCOSE BLDC GLUCOMTR-MCNC: 115 MG/DL (ref 70–105)
GLUCOSE BLDC GLUCOMTR-MCNC: 119 MG/DL (ref 70–105)
GLUCOSE BLDC GLUCOMTR-MCNC: 125 MG/DL (ref 70–105)
GLUCOSE BLDC GLUCOMTR-MCNC: 135 MG/DL (ref 70–105)
GLUCOSE BLDC GLUCOMTR-MCNC: 135 MG/DL (ref 70–105)
GLUCOSE BLDC GLUCOMTR-MCNC: 138 MG/DL (ref 70–105)
GLUCOSE BLDC GLUCOMTR-MCNC: 139 MG/DL (ref 70–105)
GLUCOSE BLDC GLUCOMTR-MCNC: 152 MG/DL (ref 70–105)
GLUCOSE BLDC GLUCOMTR-MCNC: 153 MG/DL (ref 70–105)
GLUCOSE BLDC GLUCOMTR-MCNC: 182 MG/DL (ref 70–105)
GLUCOSE BLDC GLUCOMTR-MCNC: 222 MG/DL (ref 70–105)
GLUCOSE BLDC GLUCOMTR-MCNC: 231 MG/DL (ref 70–105)
GLUCOSE BLDC GLUCOMTR-MCNC: 237 MG/DL (ref 70–105)
GLUCOSE BLDC GLUCOMTR-MCNC: 248 MG/DL (ref 70–105)
GLUCOSE BLDC GLUCOMTR-MCNC: 280 MG/DL (ref 70–105)
GLUCOSE BLDC GLUCOMTR-MCNC: 289 MG/DL (ref 70–105)
GLUCOSE BLDC GLUCOMTR-MCNC: 366 MG/DL (ref 70–105)
GLUCOSE BLDC GLUCOMTR-MCNC: 377 MG/DL (ref 70–105)
GLUCOSE SERPL-MCNC: 126 MG/DL (ref 65–99)
GLUCOSE SERPL-MCNC: 155 MG/DL (ref 65–99)
GLUCOSE SERPL-MCNC: 155 MG/DL (ref 65–99)
GLUCOSE SERPL-MCNC: 214 MG/DL (ref 65–99)
GLUCOSE SERPL-MCNC: 225 MG/DL (ref 65–99)
GLUCOSE SERPL-MCNC: 268 MG/DL (ref 65–99)
HCT VFR BLD AUTO: 38.3 % (ref 34–46.6)
HDLC SERPL-MCNC: 60 MG/DL (ref 40–60)
HGB BLD-MCNC: 12.4 G/DL (ref 12–15.9)
IMM GRANULOCYTES # BLD AUTO: 0.08 10*3/MM3 (ref 0–0.05)
IMM GRANULOCYTES NFR BLD AUTO: 0.5 % (ref 0–0.5)
LDLC SERPL CALC-MCNC: 117 MG/DL (ref 0–100)
LDLC/HDLC SERPL: 1.83 {RATIO}
LYMPHOCYTES # BLD AUTO: 1.5 10*3/MM3 (ref 0.7–3.1)
LYMPHOCYTES NFR BLD AUTO: 9.5 % (ref 19.6–45.3)
MAGNESIUM SERPL-MCNC: 2.2 MG/DL (ref 1.6–2.6)
MAGNESIUM SERPL-MCNC: 2.3 MG/DL (ref 1.6–2.6)
MAGNESIUM SERPL-MCNC: 2.3 MG/DL (ref 1.6–2.6)
MAGNESIUM SERPL-MCNC: 2.4 MG/DL (ref 1.6–2.6)
MAGNESIUM SERPL-MCNC: 2.4 MG/DL (ref 1.6–2.6)
MCH RBC QN AUTO: 27.1 PG (ref 26.6–33)
MCHC RBC AUTO-ENTMCNC: 32.4 G/DL (ref 31.5–35.7)
MCV RBC AUTO: 83.8 FL (ref 79–97)
MONOCYTES # BLD AUTO: 1.01 10*3/MM3 (ref 0.1–0.9)
MONOCYTES NFR BLD AUTO: 6.4 % (ref 5–12)
NEUTROPHILS NFR BLD AUTO: 13.1 10*3/MM3 (ref 1.7–7)
NEUTROPHILS NFR BLD AUTO: 83.5 % (ref 42.7–76)
NRBC BLD AUTO-RTO: 0 /100 WBC (ref 0–0.2)
PHOSPHATE SERPL-MCNC: 0.9 MG/DL (ref 2.5–4.5)
PHOSPHATE SERPL-MCNC: 1.2 MG/DL (ref 2.5–4.5)
PHOSPHATE SERPL-MCNC: 1.4 MG/DL (ref 2.5–4.5)
PHOSPHATE SERPL-MCNC: 1.4 MG/DL (ref 2.5–4.5)
PHOSPHATE SERPL-MCNC: 1.5 MG/DL (ref 2.5–4.5)
PLATELET # BLD AUTO: 545 10*3/MM3 (ref 140–450)
PMV BLD AUTO: 8.9 FL (ref 6–12)
POTASSIUM SERPL-SCNC: 3.5 MMOL/L (ref 3.5–5.2)
POTASSIUM SERPL-SCNC: 3.7 MMOL/L (ref 3.5–5.2)
POTASSIUM SERPL-SCNC: 4.1 MMOL/L (ref 3.5–5.2)
POTASSIUM SERPL-SCNC: 4.4 MMOL/L (ref 3.5–5.2)
PROT SERPL-MCNC: 7 G/DL (ref 6–8.5)
QT INTERVAL: 310 MS
QTC INTERVAL: 440 MS
RBC # BLD AUTO: 4.57 10*6/MM3 (ref 3.77–5.28)
SODIUM SERPL-SCNC: 135 MMOL/L (ref 136–145)
SODIUM SERPL-SCNC: 136 MMOL/L (ref 136–145)
SODIUM SERPL-SCNC: 136 MMOL/L (ref 136–145)
SODIUM SERPL-SCNC: 138 MMOL/L (ref 136–145)
TRIGL SERPL-MCNC: 257 MG/DL (ref 0–150)
TSH SERPL DL<=0.05 MIU/L-ACNC: 0.98 UIU/ML (ref 0.27–4.2)
VLDLC SERPL-MCNC: 44 MG/DL (ref 5–40)
WBC NRBC COR # BLD AUTO: 15.71 10*3/MM3 (ref 3.4–10.8)

## 2024-04-19 PROCEDURE — 70551 MRI BRAIN STEM W/O DYE: CPT

## 2024-04-19 PROCEDURE — 63710000001 INSULIN GLARGINE PER 5 UNITS: Performed by: INTERNAL MEDICINE

## 2024-04-19 PROCEDURE — 80048 BASIC METABOLIC PNL TOTAL CA: CPT | Performed by: NURSE PRACTITIONER

## 2024-04-19 PROCEDURE — 25010000002 HEPARIN (PORCINE) PER 1000 UNITS: Performed by: NURSE PRACTITIONER

## 2024-04-19 PROCEDURE — 83735 ASSAY OF MAGNESIUM: CPT | Performed by: NURSE PRACTITIONER

## 2024-04-19 PROCEDURE — 36415 COLL VENOUS BLD VENIPUNCTURE: CPT | Performed by: NURSE PRACTITIONER

## 2024-04-19 PROCEDURE — 36410 VNPNXR 3YR/> PHY/QHP DX/THER: CPT

## 2024-04-19 PROCEDURE — 70496 CT ANGIOGRAPHY HEAD: CPT

## 2024-04-19 PROCEDURE — 82948 REAGENT STRIP/BLOOD GLUCOSE: CPT

## 2024-04-19 PROCEDURE — 84100 ASSAY OF PHOSPHORUS: CPT | Performed by: NURSE PRACTITIONER

## 2024-04-19 PROCEDURE — 70498 CT ANGIOGRAPHY NECK: CPT

## 2024-04-19 PROCEDURE — 25810000003 SODIUM CHLORIDE 0.9 % SOLUTION: Performed by: INTERNAL MEDICINE

## 2024-04-19 PROCEDURE — 25510000001 IOPAMIDOL PER 1 ML: Performed by: INTERNAL MEDICINE

## 2024-04-19 PROCEDURE — 25010000002 POTASSIUM CHLORIDE PER 2 MEQ: Performed by: NURSE PRACTITIONER

## 2024-04-19 PROCEDURE — 25010000002 LABETALOL 5 MG/ML SOLUTION: Performed by: INTERNAL MEDICINE

## 2024-04-19 PROCEDURE — C1751 CATH, INF, PER/CENT/MIDLINE: HCPCS

## 2024-04-19 PROCEDURE — 80061 LIPID PANEL: CPT | Performed by: NURSE PRACTITIONER

## 2024-04-19 PROCEDURE — 82948 REAGENT STRIP/BLOOD GLUCOSE: CPT | Performed by: INTERNAL MEDICINE

## 2024-04-19 PROCEDURE — 80050 GENERAL HEALTH PANEL: CPT | Performed by: NURSE PRACTITIONER

## 2024-04-19 PROCEDURE — 25010000002 ONDANSETRON PER 1 MG: Performed by: NURSE PRACTITIONER

## 2024-04-19 RX ORDER — SODIUM CHLORIDE 9 MG/ML
100 INJECTION, SOLUTION INTRAVENOUS CONTINUOUS
Status: DISCONTINUED | OUTPATIENT
Start: 2024-04-19 | End: 2024-04-20

## 2024-04-19 RX ORDER — SODIUM CHLORIDE 9 MG/ML
40 INJECTION, SOLUTION INTRAVENOUS AS NEEDED
Status: DISCONTINUED | OUTPATIENT
Start: 2024-04-19 | End: 2024-04-22 | Stop reason: HOSPADM

## 2024-04-19 RX ORDER — NICOTINE POLACRILEX 4 MG
15 LOZENGE BUCCAL
Status: DISCONTINUED | OUTPATIENT
Start: 2024-04-19 | End: 2024-04-20 | Stop reason: SDUPTHER

## 2024-04-19 RX ORDER — INSULIN LISPRO 100 [IU]/ML
2-7 INJECTION, SOLUTION INTRAVENOUS; SUBCUTANEOUS
Status: DISCONTINUED | OUTPATIENT
Start: 2024-04-19 | End: 2024-04-19

## 2024-04-19 RX ORDER — INSULIN LISPRO 100 [IU]/ML
2-7 INJECTION, SOLUTION INTRAVENOUS; SUBCUTANEOUS
Status: DISCONTINUED | OUTPATIENT
Start: 2024-04-20 | End: 2024-04-20

## 2024-04-19 RX ORDER — DEXTROSE MONOHYDRATE 25 G/50ML
10-50 INJECTION, SOLUTION INTRAVENOUS
Status: DISCONTINUED | OUTPATIENT
Start: 2024-04-19 | End: 2024-04-20 | Stop reason: SDUPTHER

## 2024-04-19 RX ORDER — DEXTROSE MONOHYDRATE 25 G/50ML
25 INJECTION, SOLUTION INTRAVENOUS
Status: DISCONTINUED | OUTPATIENT
Start: 2024-04-19 | End: 2024-04-22 | Stop reason: HOSPADM

## 2024-04-19 RX ORDER — FENTANYL/ROPIVACAINE/NS/PF 2-625MCG/1
15 PLASTIC BAG, INJECTION (ML) EPIDURAL
Qty: 750 ML | Refills: 0 | Status: COMPLETED | OUTPATIENT
Start: 2024-04-19 | End: 2024-04-20

## 2024-04-19 RX ORDER — INSULIN LISPRO 100 [IU]/ML
INJECTION, SOLUTION INTRAVENOUS; SUBCUTANEOUS
Qty: 30 ML | Refills: 0 | OUTPATIENT
Start: 2024-04-19

## 2024-04-19 RX ORDER — IBUPROFEN 600 MG/1
1 TABLET ORAL
Status: DISCONTINUED | OUTPATIENT
Start: 2024-04-19 | End: 2024-04-20 | Stop reason: SDUPTHER

## 2024-04-19 RX ORDER — SODIUM CHLORIDE 0.9 % (FLUSH) 0.9 %
10 SYRINGE (ML) INJECTION EVERY 12 HOURS SCHEDULED
Status: DISCONTINUED | OUTPATIENT
Start: 2024-04-20 | End: 2024-04-22 | Stop reason: HOSPADM

## 2024-04-19 RX ORDER — DEXTROSE MONOHYDRATE 25 G/50ML
25 INJECTION, SOLUTION INTRAVENOUS
Status: DISCONTINUED | OUTPATIENT
Start: 2024-04-19 | End: 2024-04-19 | Stop reason: SDUPTHER

## 2024-04-19 RX ORDER — IBUPROFEN 600 MG/1
1 TABLET ORAL
Status: DISCONTINUED | OUTPATIENT
Start: 2024-04-19 | End: 2024-04-19 | Stop reason: SDUPTHER

## 2024-04-19 RX ORDER — ATORVASTATIN CALCIUM 40 MG/1
80 TABLET, FILM COATED ORAL NIGHTLY
Status: DISCONTINUED | OUTPATIENT
Start: 2024-04-20 | End: 2024-04-22 | Stop reason: HOSPADM

## 2024-04-19 RX ORDER — ASPIRIN 300 MG/1
300 SUPPOSITORY RECTAL DAILY
Status: DISCONTINUED | OUTPATIENT
Start: 2024-04-20 | End: 2024-04-22 | Stop reason: HOSPADM

## 2024-04-19 RX ORDER — LABETALOL HYDROCHLORIDE 5 MG/ML
10 INJECTION, SOLUTION INTRAVENOUS EVERY 6 HOURS PRN
Status: DISCONTINUED | OUTPATIENT
Start: 2024-04-19 | End: 2024-04-22 | Stop reason: HOSPADM

## 2024-04-19 RX ORDER — SODIUM CHLORIDE 0.9 % (FLUSH) 0.9 %
10 SYRINGE (ML) INJECTION AS NEEDED
Status: DISCONTINUED | OUTPATIENT
Start: 2024-04-19 | End: 2024-04-22 | Stop reason: HOSPADM

## 2024-04-19 RX ORDER — NICOTINE POLACRILEX 4 MG
15 LOZENGE BUCCAL
Status: DISCONTINUED | OUTPATIENT
Start: 2024-04-19 | End: 2024-04-19 | Stop reason: SDUPTHER

## 2024-04-19 RX ORDER — ASPIRIN 325 MG
325 TABLET ORAL DAILY
Status: DISCONTINUED | OUTPATIENT
Start: 2024-04-20 | End: 2024-04-22 | Stop reason: HOSPADM

## 2024-04-19 RX ADMIN — POTASSIUM CHLORIDE, DEXTROSE MONOHYDRATE AND SODIUM CHLORIDE 150 ML/HR: 150; 5; 450 INJECTION, SOLUTION INTRAVENOUS at 01:35

## 2024-04-19 RX ADMIN — IOPAMIDOL 100 ML: 755 INJECTION, SOLUTION INTRAVENOUS at 22:46

## 2024-04-19 RX ADMIN — POTASSIUM CHLORIDE, DEXTROSE MONOHYDRATE AND SODIUM CHLORIDE 150 ML/HR: 150; 5; 450 INJECTION, SOLUTION INTRAVENOUS at 08:08

## 2024-04-19 RX ADMIN — INSULIN GLARGINE 10 UNITS: 100 INJECTION, SOLUTION SUBCUTANEOUS at 23:29

## 2024-04-19 RX ADMIN — Medication 10 ML: at 08:04

## 2024-04-19 RX ADMIN — SODIUM CHLORIDE 100 ML/HR: 9 INJECTION, SOLUTION INTRAVENOUS at 23:34

## 2024-04-19 RX ADMIN — INSULIN GLARGINE 20 UNITS: 100 INJECTION, SOLUTION SUBCUTANEOUS at 09:20

## 2024-04-19 RX ADMIN — HEPARIN SODIUM 5000 UNITS: 5000 INJECTION INTRAVENOUS; SUBCUTANEOUS at 14:10

## 2024-04-19 RX ADMIN — POTASSIUM PHOSPHATE, MONOBASIC AND POTASSIUM PHOSPHATE, DIBASIC 15 MMOL: 224; 236 INJECTION, SOLUTION, CONCENTRATE INTRAVENOUS at 20:27

## 2024-04-19 RX ADMIN — Medication 10 ML: at 23:34

## 2024-04-19 RX ADMIN — Medication 10 ML: at 20:27

## 2024-04-19 RX ADMIN — POTASSIUM PHOSPHATE, MONOBASIC AND POTASSIUM PHOSPHATE, DIBASIC 15 MMOL: 224; 236 INJECTION, SOLUTION, CONCENTRATE INTRAVENOUS at 23:30

## 2024-04-19 RX ADMIN — Medication 10 MG: at 12:09

## 2024-04-19 RX ADMIN — HEPARIN SODIUM 5000 UNITS: 5000 INJECTION INTRAVENOUS; SUBCUTANEOUS at 06:02

## 2024-04-19 RX ADMIN — POTASSIUM PHOSPHATE, MONOBASIC AND POTASSIUM PHOSPHATE, DIBASIC 15 MMOL: 224; 236 INJECTION, SOLUTION, CONCENTRATE INTRAVENOUS at 17:12

## 2024-04-19 RX ADMIN — FAMOTIDINE 20 MG: 10 INJECTION INTRAVENOUS at 08:03

## 2024-04-19 RX ADMIN — INSULIN HUMAN 3.7 UNITS/HR: 1 INJECTION, SOLUTION INTRAVENOUS at 18:52

## 2024-04-19 RX ADMIN — POTASSIUM CHLORIDE AND SODIUM CHLORIDE 250 ML/HR: 450; 150 INJECTION, SOLUTION INTRAVENOUS at 16:02

## 2024-04-19 RX ADMIN — ONDANSETRON 4 MG: 2 INJECTION INTRAMUSCULAR; INTRAVENOUS at 18:59

## 2024-04-19 RX ADMIN — HEPARIN SODIUM 5000 UNITS: 5000 INJECTION INTRAVENOUS; SUBCUTANEOUS at 23:30

## 2024-04-19 RX ADMIN — POTASSIUM CHLORIDE, DEXTROSE MONOHYDRATE AND SODIUM CHLORIDE 150 ML/HR: 150; 5; 450 INJECTION, SOLUTION INTRAVENOUS at 16:36

## 2024-04-19 NOTE — NURSING NOTE
RN tried several times all day to have BP cuff on patient. Patient will take off immediately when RN leaves room. In AM IV was ripped out by patient taking BP cuff off

## 2024-04-19 NOTE — PAYOR COMM NOTE
"This is clinical for Karly Zayas   Reference/Auth#: [id number]     AUTHORIZATION PENDING:     Please call or fax determination to contact below.   Thank you.    HOMERO Le, RN, CCM  Utilization Review Nurse  [facility]  Direct & confidential phone # [phone]  Fax # [phone]  =================    ER ADMISSION    INPT ORDER PLACED 4/18/24 @ 1156    ADMITTED TO INTENSIVE CARE UNIT      Karly Zayas (53 y.o. Female)       Date of Birth   1970    Social Security Number       Address   [address] [place] [address] [address] [address]    Home Phone   [phone]    MRN   [id number]       Restorationism   None    Marital Status                               Admission Date   4/18/24    Admission Type   Emergency    Admitting Provider   Jeremy Mendoza DO    Attending Provider   Alex Reese MD    Department, Room/Bed   [facility] INTENSIVE CARE UNIT, 2315/1       Discharge Date       Discharge Disposition       Discharge Destination                                 Attending Provider: Alex Reese MD    Allergies: Prednisone, Nitrofurantoin Monohyd Macro    Isolation: Enh Drop/Con   Infection: None   Code Status: CPR    Ht: 175.3 cm (69.02\")   Wt: 83.8 kg (184 lb 11.9 oz)    Admission Cmt: None   Principal Problem: DKA, type 1 [E10.10]                   Active Insurance as of 4/18/2024       Primary Coverage       Payor Plan Insurance Group Employer/Plan Group    [facility] [employer] [facility] PPO [id number]       Payor Plan Address Payor Plan Phone Number Payor Plan Fax Number Effective Dates    [address] [phone]  3/17/2023 - None Entered    [facility] [id number]         Subscriber Name Subscriber Birth Date Member ID       CHESTER ZAYAS [birth year]/1963 [id number]                     Emergency Contacts        (Rel.) Home Phone Work Phone Mobile Phone    Chester Zayas (Spouse) [phone] -- --                 History & " eKnyon RojasZhangBEV at 24 1209       Attestation signed by Jeremy Mendoza DO at 24 2040    I have reviewed this documentation and agree with NP note, with any exceptions noted below.  Pt seen and examined by me personally.  I have personally reviewed all overnight events, vitals, labs, chart notes, and imaging.  The NP and I have collaborated to design the stated plan.                   Critical Care History and Physical     Karly Zayas : 1970 MRN:[id number] LOS:0 ROOM:      Reason for admission: DKA, type 1     Assessment / Plan     Diabetic ketoacidosis without coma, with history of diabetes mellitus, type 1  Etiology likely due to pump malfunction, recently switched to a new pump per spouse report.  CT of the abdomen pelvis was without intra-abdominal abnormalities, no overt signs of infection.  Anion gap of 38 on admission.  A1c 9.10, Acetone-large.  Leukocytosis without bandemia, likely reactive response to DKA.  DKA protocol initiated.  Insulin drip initiated per DKA protocol.  Adjust iv fluids based on glucose, sodium, and potassium per protocol.  Accuchecks every hour and serial labs as ordered.  Continue protocol until resolved per protocol recommendations.  Remove insulin pump and needle.  Upon resolution, will consult endocrinology, followed by Dr. Patton as outpatient.    Toxic metabolic encephalopathy  Hold all mental status altering medications.  Ct Head without acute intracranial abnormalities.  Continue to correct electrolytes and continue IVF.    Further workup as clinically indicated.    Diabetic neuropathy: Hold gabapentin, Trileptal  Insomnia: Hold Pamelor.  Anxiety/Depression: Hold Celexa, Klonopin.      Code Status (Patient has no pulse and is not breathing): CPR (Attempt to Resuscitate)  Medical Interventions (Patient has pulse or is breathing): Full Support       Nutrition:   NPO Diet NPO Type: Strict NPO     DVT  prophylaxis:  Medical DVT prophylaxis orders are present.         History of Present illness     Karly Zayas is a 53 y.o. female with PMH of diabetes mellitus type 1, diabetic neuropathy, anxiety/depression, and presented to the hospital for hyperglycemia, and was admitted with a principal diagnosis of DKA, type 1.  The following has been culminated from review of patient's chart, discussion with provider, and with patient's spouse, as patient is with altered mental status.  Patient had been having issues with her blood glucose, in which she had been giving herself insulin attempting to contain the problem.  She recently started a new insulin pump and apparently has been having issues with this device.  Patient was starting to also develop some increasing confusion.  There were no reported fevers or chills, dysuria, but patient does have generalized abdominal pain, nausea, and vomiting.    In the ED, patient had a venous blood gas with pH 7.117, pCO2 18.8, HCO3 6.1.  Labs were obtained with the following abnormalities: Troponin 32 with reflex of 27, sodium 124, chloride 81, CO2 5, anion gap 38, BUN 54, creatinine 2.08, glucose 681, magnesium 2.9, phosphorus 5.4, alk phos 147, A1c 9.1, acetone large, lipase 292, osmolality 339, WBC 23.81, platelets 750.  Blood cultures were obtained.  Patient was determined to be in DKA, and was given IV fluid bolus as well as initiation of insulin drip per DKA protocol.  CT of the head was obtained due to patient's altered mental status and was without acute intracranial abnormalities.  Additionally due to patient's inability to provide good historical information and reported abdominal pain, obtained a CT of the abdomen pelvis that was without abnormalities of the abdomen or pelvis.  Intensivist service was consulted to admit patient for further treatment and evaluation.    ACP: Patient's spouse would be decision maker in the event that patient is unable to make her own  decision.    Patient was seen and examined on 24 at 12:10 EDT .    Subjective / Review of systems     Review of Systems   Unable to perform ROS: Mental status change        Past Medical/Surgical/Social/Family History & Allergies     Past Medical History:   Diagnosis Date    Anxiety     Autonomic neuropathy associated with type 1 diabetes mellitus     Bipolar 2 disorder     Cataracts, bilateral     Diabetes mellitus     TYPE 1    Diabetes mellitus type I     Diabetic retinopathy associated with type 1 diabetes mellitus     Disease of thyroid gland     DKA (diabetic ketoacidoses)     Foot fracture, right     High cholesterol     Kidney stone     Macular degeneration, bilateral     Neuropathy     Pelvic pain       Past Surgical History:   Procedure Laterality Date    ANKLE SURGERY Right 2015     SECTION      COLONOSCOPY N/A 2023    Procedure: COLONOSCOPY;  Surgeon: Lori Cleary MD;  Location: [facility] ENDOSCOPY;  Service: Gastroenterology;  Laterality: N/A;    DIAGNOSTIC LAPAROSCOPY      DIAGNOSTIC LAPAROSCOPY N/A 2017    Procedure: LAPAROSCOPY, PARTIAL LEFT SALPINGECTOMY;  Surgeon: Arleen Quintana MD;  Location: [facility];  Service:     DILATATION AND CURETTAGE N/A 7/10/2018    Procedure: FROZEN DILATATION AND CURETTAGE POWER MORECELLATOR;  Surgeon: Nicole Shannon MD;  Location: [facility] OR;  Service: Gynecology    HYSTERECTOMY SUPRACERVICAL N/A 7/10/2018    Procedure: LAPAROSCOPIC SUPRACERVICAL HYSTERECTOMY WITH MORCELLATOR, LEFT SALPINGECTOMY;  Surgeon: Nicole Shannon MD;  Location: [facility];  Service: Gynecology    HYSTEROSCOPY ENDOMETRIAL ABLATION      WITH TUBAL AND RIGHT OVARY REMOVAL    TONSILLECTOMY        Social History     Socioeconomic History    Marital status:    Tobacco Use    Smoking status: Former     Current packs/day: 0.00     Types: Cigarettes     Start date: 1992     Quit date: 2017     Years since quittin.7      Passive exposure: Past    Smokeless tobacco: Former    Tobacco comments:     Currently using vaping   Vaping Use    Vaping status: Some Days    Substances: Nicotine, Flavoring    Devices: Pre-filled or refillable cartridge   Substance and Sexual Activity    Alcohol use: No    Drug use: No    Sexual activity: Defer      Family History   Problem Relation Age of Onset    Malig Hyperthermia Neg Hx       Allergies   Allergen Reactions    Prednisone Swelling     high fever/ body aches    Nitrofurantoin Monohyd Macro Other (See Comments)     severe reaction- hospitalized      Social Determinants of Health     Tobacco Use: Medium Risk (3/15/2024)    Patient History     Smoking Tobacco Use: Former     Smokeless Tobacco Use: Former     Passive Exposure: Past   Alcohol Use: Not on file   Financial Resource Strain: Not on file   Food Insecurity: Not on file   Transportation Needs: Not on file   Physical Activity: Not on file   Stress: Not on file   Social Connections: Unknown (10/10/2023)    Family and Community Support     Help with Day-to-Day Activities: Not on file     Lonely or Isolated: Not on file   Interpersonal Safety: Not At Risk (4/18/2024)    Abuse Screen     Unsafe at Home or Work/School: no     Feels Threatened by Someone?: no     Does Anyone Keep You from Contacting Others or Doint Things Outside the Home?: no     Physical Sign of Abuse Present: no   Depression: Not on file   Housing Stability: Unknown (10/10/2023)    Housing Stability     Current Living Arrangements: Not on file     Potentially Unsafe Housing Conditions: Not on file   Utilities: Not on file   Health Literacy: Unknown (10/10/2023)    Education     Help with school or training?: Not on file     Preferred Language: Not on file   Employment: Unknown (10/10/2023)    Employment     Do you want help finding or keeping work or a job?: Not on file   Disabilities: Unknown (10/10/2023)    Disabilities     Concentrating, Remembering, or Making Decisions  "Difficulty: Not on file     Doing Errands Independently Difficulty: Not on file        Home Medications     Prior to Admission medications    Medication Sig Start Date End Date Taking? Authorizing Provider   acetone, urine, test strip Use to check for ketones prn 9/26/22   Nai Patton MD   albuterol sulfate  (90 Base) MCG/ACT inhaler Inhale 2 puffs. 3/13/22   Daisy Cullen MD   atorvastatin (LIPITOR) 40 MG tablet TAKE 1 TABLET BY MOUTH EVERY DAY  8/30/21   Nai Patton MD   citalopram (CeleXA) 20 MG tablet  1/31/23   Daisy Cullen MD   clonazePAM (KlonoPIN) 1 MG tablet Take 1 tablet by mouth 2 (Two) Times a Day As Needed for Anxiety.    Daisy Cullen MD   Continuous Blood Gluc Sensor (Dexcom G6 Sensor) Every 10 (Ten) Days. 6/6/23   Nai Patton MD   Continuous Blood Gluc Transmit (Dexcom G6 Transmitter) misc 1 each Every 3 (Three) Months. 6/6/23   Nai Patton MD   famotidine (PEPCID) 40 MG tablet Take 1 tablet by mouth.    Daisy Cullen MD   gabapentin (NEURONTIN) 300 MG capsule TAKE 1 CAPSULE BY MOUTH THREE TIMES A DAY, max 3 per day 12/28/21   Daisy Cullen MD   Glucagon, rDNA, (Glucagon Emergency) 1 MG kit Use prn for severe low blood sugar 9/26/22   Nai Patton MD   HYDROcodone-acetaminophen (NORCO)  MG per tablet Take 1 tablet by mouth Every 6 (Six) Hours As Needed. 3/1/24   Daisy Cullen MD   Insulin Glargine (BASAGLAR KWIKPEN) 100 UNIT/ML injection pen Inject 19 units once daily. Use in case of pump failure. 9/26/22   Nai Patton MD   Insulin Lispro (humaLOG) 100 UNIT/ML injection USE WITH INSULIN PUMP WITH A MAX DAILY DOSE  UNITS 3/22/24   Nai Patton MD   Insulin Syringe-Needle U-100 30G X 5/16\" 0.5 ML misc Use for insulin injections. 3x/d. Use in case of pump failure 9/26/22   Nai Patton MD   nortriptyline (PAMELOR) 10 MG capsule Take 1 capsule by mouth Every Night. 2/15/24   Provider, " MD Daisy   ondansetron ODT (ZOFRAN-ODT) 4 MG disintegrating tablet  1/19/22   Daisy Cullen MD   OXcarbazepine (TRILEPTAL) 300 MG tablet  1/31/23   Daisy Cullen MD   oxyCODONE-acetaminophen (PERCOCET) 5-325 MG per tablet Take 1-2 tablets by mouth Every 6 (Six) Hours As Needed (pain). 6/20/23   Yair Tse MD   SUMAtriptan (IMITREX) 50 MG tablet  4/5/23   Provider, MD Daisy   vitamin D (ERGOCALCIFEROL) 1.25 MG (52000 UT) capsule capsule Take 1 capsule by mouth 1 (One) Time Per Week. 8/3/22   Nai Patton MD        Objective / Physical Exam     Vital signs:  Temp: 98.7 °F (37.1 °C)  BP: 156/81  Heart Rate: 119  Resp: 18  SpO2: 100 %  Weight: 88.5 kg (195 lb 1.7 oz)    Admission Weight: Weight: 88.5 kg (195 lb 1.7 oz)    Physical Exam  Vitals and nursing note reviewed.   Constitutional:       General: She is in acute distress.      Appearance: Normal appearance. She is ill-appearing and toxic-appearing. She is not diaphoretic.   HENT:      Head: Normocephalic and atraumatic.      Nose: Nose normal. No congestion or rhinorrhea.      Mouth/Throat:      Mouth: Mucous membranes are dry.      Pharynx: Oropharynx is clear. No oropharyngeal exudate or posterior oropharyngeal erythema.   Eyes:      General: No scleral icterus.     Extraocular Movements: Extraocular movements intact.      Conjunctiva/sclera: Conjunctivae normal.      Pupils: Pupils are equal, round, and reactive to light.   Cardiovascular:      Rate and Rhythm: Regular rhythm. Tachycardia present.      Pulses: Normal pulses.      Heart sounds: Normal heart sounds. No murmur heard.     No gallop.   Pulmonary:      Effort: Pulmonary effort is normal.      Comments: Clear breath sounds throughout, tachypneic but without accessory muscle use.  Abdominal:      General: Bowel sounds are normal. There is no distension.      Palpations: Abdomen is soft.      Tenderness: There is abdominal tenderness. There is no guarding or  rebound.   Musculoskeletal:         General: No tenderness.      Cervical back: Normal range of motion and neck supple. No rigidity.      Right lower leg: No edema.      Left lower leg: No edema.   Skin:     General: Skin is warm and dry.      Findings: No rash.   Neurological:      General: No focal deficit present.      Mental Status: She is easily aroused. She is lethargic.      Comments: Oriented to person, lethargic, easily arousable, no obvious focal deficit noted.  Spontaneously moving extremities.   Psychiatric:      Comments: Lethargic, drowsy, cooperative.          Labs     Results from last 7 days   Lab Units 04/18/24  1030   WBC 10*3/mm3 23.81*   HEMATOCRIT % 45.4   PLATELETS 10*3/mm3 750*      Results from last 7 days   Lab Units 04/18/24  1030   SODIUM mmol/L 124*   POTASSIUM mmol/L 4.8   CHLORIDE mmol/L 81*   CO2 mmol/L 5.0*   BUN mg/dL 54*   CREATININE mg/dL 2.08*        Imaging     CT Abdomen Pelvis Without Contrast    Result Date: 4/18/2024  Impression: Limited exam due to respiratory artifact No definite acute CT abnormalities in the abdomen or pelvis Electronically Signed: Ben Vallejo MD  4/18/2024 1:15 PM EDT  Workstation ID: [id number]    CT Head Without Contrast    Result Date: 4/18/2024  Impression: Limited exam due to motion artifact No definite acute intracranial abnormality. A follow-up exam is advised if symptoms persist Electronically Signed: Ben Vallejo MD  4/18/2024 12:39 PM EDT  Workstation ID: [id number]    XR Chest 1 View    Result Date: 4/18/2024  Impression: No acute process identified Electronically Signed: Hardeep David MD  4/18/2024 11:52 AM EDT  Workstation ID: [id number]     EKG: My independent evaluation showed Sinus tachycardia without ST or T wave abnormalities, heart rate 121, QTc 440.    Current Medications     Scheduled Meds:  famotidine, 20 mg, Intravenous, Daily  heparin (porcine), 5,000 Units, Subcutaneous, Q8H  sodium chloride, 10 mL, Intravenous, Q12H          Continuous Infusions:  dextrose 5 % and sodium chloride 0.45 %, 150 mL/hr  dextrose 5 % and sodium chloride 0.45 % with KCl 20 mEq/L, 150 mL/hr  dextrose 5 % and sodium chloride 0.45 % with KCl 40 mEq/L, 150 mL/hr  dextrose 5 % and sodium chloride 0.9 %, 150 mL/hr  dextrose 5 % and sodium chloride 0.9 % with KCl 20 mEq, 150 mL/hr  dextrose 5% and sodium chloride 0.9% with KCl 40 mEq/L, 150 mL/hr  insulin, 0-100 Units/hr, Last Rate: 5.4 Units/hr (04/18/24 1152)  sodium chloride 0.45 % 1,000 mL with potassium chloride 40 mEq infusion, 250 mL/hr  sodium chloride, 250 mL/hr  sodium chloride 0.45 % with KCl 20 mEq, 250 mL/hr  sodium chloride, 1,000 mL/hr, Last Rate: 1,000 mL/hr (04/18/24 1133)  sodium chloride, 250 mL/hr  sodium chloride 0.9 % with KCl 20 mEq, 250 mL/hr  sodium chloride 0.9 % with KCl 40 mEq/L, 250 mL/hr       Plan discussed with RN. Reviewed all other data in the last 24 hours, including but not limited to vitals, labs, microbiology, imaging and pertinent notes from other providers.  Plan also discussed with patient and her family at the bedside.      BEV Ibrahim   Critical Care  04/18/24   12:10 EDT      Electronically signed by Jeremy Mendoza DO at 04/18/24 1739          Emergency Department Notes        Shruti Villalta PA at 04/18/24 1004       Attestation signed by Alex Louis DO at 04/19/24 0810        SHARED APC NON FACE TO FACE: I performed a substantive part of the MDM during the patient's E/M visit. I personally made or approved the documented management plan and acknowledge its risk of complications.   Alex Louis DO 4/19/2024 08:10 EDT                         Subjective   History of Present Illness  Pt is a 54yo WF with PMH of T1DM presenting to ED for hyperglycemia. Pt states she noticed her blood sugar was high last night, gave herself insulin which brought it down. She woke up this morning with it high again. Pt unable to give readings as she is  very out of it. Pt admits to having generalized abdominal pain, nausea, and vomiting.  Patient's  at bedside reports no fever or urinary complaints.  History somewhat limited due to patient's mental status change.  He reports she seemed somewhat more alert last night when her sugars were improved but has continued to have altered mental status today.  No reports of head injury or falls.    History provided by:  Patient      Review of Systems   Unable to perform ROS: Mental status change       Past Medical History:   Diagnosis Date    Anxiety     Autonomic neuropathy associated with type 1 diabetes mellitus     Bipolar 2 disorder     Cataracts, bilateral     Diabetes mellitus     TYPE 1    Diabetes mellitus type I     Diabetic retinopathy associated with type 1 diabetes mellitus     Disease of thyroid gland     DKA (diabetic ketoacidoses)     Foot fracture, right     High cholesterol     Kidney stone     Macular degeneration, bilateral     Neuropathy     Pelvic pain        Allergies   Allergen Reactions    Prednisone Swelling     high fever/ body aches    Nitrofurantoin Monohyd Macro Other (See Comments)     severe reaction- hospitalized       Past Surgical History:   Procedure Laterality Date    ANKLE SURGERY Right 2015     SECTION      COLONOSCOPY N/A 2023    Procedure: COLONOSCOPY;  Surgeon: Lori Cleary MD;  [facility] ENDOSCOPY;  Service: Gastroenterology;  Laterality: N/A;    DIAGNOSTIC LAPAROSCOPY      DIAGNOSTIC LAPAROSCOPY N/A 2017    Procedure: LAPAROSCOPY, PARTIAL LEFT SALPINGECTOMY;  Surgeon: Arleen Quintana MD;  Location: [facility] [place];  Service:     DILATATION AND CURETTAGE N/A 7/10/2018    Procedure: FROZEN DILATATION AND CURETTAGE POWER MORECELLATOR;  Surgeon: Nicole Shannon MD;  Location: [facility] OR;  Service: Gynecology    HYSTERECTOMY SUPRACERVICAL N/A 7/10/2018    Procedure: LAPAROSCOPIC SUPRACERVICAL HYSTERECTOMY WITH MORCELLATOR,  LEFT SALPINGECTOMY;  Surgeon: Nicole Shannon MD;  Location: [facility];  Service: Gynecology    HYSTEROSCOPY ENDOMETRIAL ABLATION  2009    WITH TUBAL AND RIGHT OVARY REMOVAL    TONSILLECTOMY  1983       Family History   Problem Relation Age of Onset    Malig Hyperthermia Neg Hx        Social History     Socioeconomic History    Marital status:    Tobacco Use    Smoking status: Former     Current packs/day: 0.00     Types: Cigarettes     Start date: 1992     Quit date: 2017     Years since quittin.7     Passive exposure: Past    Smokeless tobacco: Former    Tobacco comments:     Currently using vaping   Vaping Use    Vaping status: Some Days    Substances: Nicotine, Flavoring    Devices: Pre-filled or refillable cartridge   Substance and Sexual Activity    Alcohol use: No    Drug use: No    Sexual activity: Defer           Objective   Physical Exam  Vitals and nursing note reviewed.   Constitutional:       General: She is not in acute distress.     Appearance: Normal appearance. She is well-developed. She is obese. She is ill-appearing. She is not toxic-appearing or diaphoretic.   HENT:      Head: Normocephalic and atraumatic.      Mouth/Throat:      Mouth: Mucous membranes are moist.      Pharynx: Oropharynx is clear.   Eyes:      General: No scleral icterus.     Extraocular Movements: Extraocular movements intact.      Pupils: Pupils are equal, round, and reactive to light.   Cardiovascular:      Rate and Rhythm: Regular rhythm. Tachycardia present.      Pulses: Normal pulses.      Heart sounds: No murmur heard.     No friction rub. No gallop.   Pulmonary:      Effort: Pulmonary effort is normal. No respiratory distress.      Breath sounds: Normal breath sounds. No stridor. No wheezing, rhonchi or rales.   Chest:      Chest wall: No tenderness.   Abdominal:      General: Bowel sounds are normal. There is no distension. There are no signs of injury.      Palpations: Abdomen is soft.       "Tenderness: There is generalized abdominal tenderness. There is no right CVA tenderness, left CVA tenderness, guarding or rebound.      Hernia: No hernia is present.   Skin:     General: Skin is warm.      Capillary Refill: Capillary refill takes less than 2 seconds.      Coloration: Skin is not cyanotic, jaundiced or pale.      Findings: No rash.   Neurological:      General: No focal deficit present.      Mental Status: She is alert.      Comments: Obtunded.  Will shake her head yes and no to some questions.  Is moving all extremities freely.   Psychiatric:         Mood and Affect: Mood normal.         Behavior: Behavior normal.         Procedures          ED Course      /62 (BP Location: Left arm, Patient Position: Lying)   Pulse 118   Temp 98.7 °F (37.1 °C) (Oral)   Resp 16   Ht 175.3 cm (69\")   Wt 88.5 kg (195 lb 1.7 oz)   LMP 06/06/2018   SpO2 97%   BMI 28.81 kg/m²   Medications   sodium chloride 0.9 % flush 10 mL (has no administration in time range)   piperacillin-tazobactam (ZOSYN) 3.375 g IVPB in 100 mL NS MBP (CD) (3.375 g Intravenous New Bag 4/18/24 1134)   sodium chloride 0.9 % flush 10 mL (has no administration in time range)   sodium chloride 0.9 % flush 10 mL (has no administration in time range)   sodium chloride 0.9 % infusion 40 mL (has no administration in time range)   dextrose (GLUTOSE) oral gel 15 g (has no administration in time range)   dextrose (D50W) (25 g/50 mL) IV injection 10-50 mL (has no administration in time range)   Glucagon (GLUCAGEN) injection 1 mg (has no administration in time range)   sodium chloride 0.9 % bolus (1,000 mL/hr Intravenous New Bag 4/18/24 1133)   sodium chloride 0.9 % infusion (has no administration in time range)   sodium chloride 0.9 % with KCl 20 mEq/L infusion (has no administration in time range)   sodium chloride 0.9 % with KCl 40 mEq/L infusion (has no administration in time range)   dextrose 5 % and sodium chloride 0.9 % infusion (has no " administration in time range)   dextrose 5 % and sodium chloride 0.9 % with KCl 20 mEq/L infusion (has no administration in time range)   dextrose 5 % and sodium chloride 0.9 % with KCl 40 mEq/L infusion (has no administration in time range)   sodium chloride 0.45 % infusion (has no administration in time range)   sodium chloride 0.45 % with KCl 20 mEq/L infusion (has no administration in time range)   sodium chloride 0.45 % 1,000 mL with potassium chloride 40 mEq infusion (has no administration in time range)   dextrose 5 % and sodium chloride 0.45 % infusion (has no administration in time range)   dextrose 5 % and sodium chloride 0.45 % with KCl 20 mEq/L infusion (has no administration in time range)   dextrose 5 % and sodium chloride 0.45 % with KCl 40 mEq/L infusion (has no administration in time range)   insulin regular 1 unit/mL in 0.9% sodium chloride (Glucommander) (has no administration in time range)   Potassium Replacement - Follow Nurse / BPA Driven Protocol (has no administration in time range)   Magnesium Standard Dose Replacement - Follow Nurse / BPA Driven Protocol (has no administration in time range)   Phosphorus Replacement - Follow Nurse / BPA Driven Protocol (has no administration in time range)   Calcium Replacement - Follow Nurse / BPA Driven Protocol (has no administration in time range)   sodium chloride 0.9 % bolus 1,000 mL (1,000 mL Intravenous New Bag 4/18/24 1044)   ondansetron (ZOFRAN) injection 4 mg (4 mg Intravenous Given 4/18/24 1043)     Labs Reviewed   COMPREHENSIVE METABOLIC PANEL - Abnormal; Notable for the following components:       Result Value    Glucose 681 (*)     BUN 54 (*)     Creatinine 2.08 (*)     Sodium 124 (*)     Chloride 81 (*)     CO2 5.0 (*)     Total Protein 8.6 (*)     Alkaline Phosphatase 147 (*)     BUN/Creatinine Ratio 26.0 (*)     Anion Gap 38.0 (*)     eGFR 28.0 (*)     All other components within normal limits    Narrative:     GFR Normal >60  Chronic  Kidney Disease <60  Kidney Failure <15     LIPASE - Abnormal; Notable for the following components:    Lipase 292 (*)     All other components within normal limits   OSMOLALITY, SERUM - Abnormal; Notable for the following components:    Osmolality 339 (*)     All other components within normal limits   BLOOD GAS, VENOUS - Abnormal; Notable for the following components:    pH, Venous 7.117 (*)     pCO2, Venous 18.8 (*)     pO2, Venous 47.5 (*)     HCO3, Venous 6.1 (*)     Base Excess, Venous -21.1 (*)     CO2 Content 6.7 (*)     All other components within normal limits   CBC WITH AUTO DIFFERENTIAL - Abnormal; Notable for the following components:    WBC 23.81 (*)     MCHC 31.3 (*)     Platelets 750 (*)     Neutrophil % 86.6 (*)     Lymphocyte % 5.2 (*)     Eosinophil % 0.0 (*)     Immature Grans % 0.9 (*)     Neutrophils, Absolute 20.59 (*)     Monocytes, Absolute 1.67 (*)     Immature Grans, Absolute 0.21 (*)     All other components within normal limits   ACETONE - Abnormal; Notable for the following components:    Acetone Large (*)     All other components within normal limits   POCT GLUCOSE FINGERSTICK - Abnormal; Notable for the following components:    Glucose >600 (*)     All other components within normal limits   COVID-19 AND FLU A/B, NP SWAB IN TRANSPORT MEDIA 1 HR TAT - Normal    Narrative:     Fact sheet for providers: https://www.fda.gov/media/482660/download    Fact sheet for patients: https://www.fda.gov/media/532347/download    Test performed by PCR.   POC LACTATE - Normal   BLOOD CULTURE   BLOOD CULTURE   URINALYSIS W/ MICROSCOPIC IF INDICATED (NO CULTURE)   PHOSPHORUS   MAGNESIUM   HEMOGLOBIN A1C   TROPONIN   BASIC METABOLIC PANEL   BASIC METABOLIC PANEL   MAGNESIUM   MAGNESIUM   PHOSPHORUS   PHOSPHORUS   CBC AND DIFFERENTIAL    Narrative:     The following orders were created for panel order CBC & Differential.  Procedure                               Abnormality         Status                      ---------                               -----------         ------                     CBC Auto Differential[[id number]]        Abnormal            Final result                 Please view results for these tests on the individual orders.   KETONE BODIES SERUM    Narrative:     The following orders were created for panel order Ketone Bodies, Serum (Not performed at [place]).  Procedure                               Abnormality         Status                     ---------                               -----------         ------                     Acetone[[id number]]                      Abnormal            Final result                 Please view results for these tests on the individual orders.     CT Head Without Contrast    (Results Pending)   CT Abdomen Pelvis Without Contrast    (Results Pending)   XR Chest 1 View    (Results Pending)                                            Medical Decision Making  Differentials: DKA, electrolyte abnormality, dehydration, arrhythmia, intra-abdominal infection, bowel obstruction     ;this list is not all inclusive and does not constitute the entirety of considered causes  EKG: Interpreted by myself and Dr Louis shows sinus tachycardia rate 121 otherwise normal EKG previous reviewed from 7/5/2022 showed sinus rhythm  Labs: as above   Radiology: Chest x-ray reviewed by myself and independently interpreted by Dr Louis   CT Head Without Contrast    (Results Pending)  CT Abdomen Pelvis Without Contrast    (Results Pending)  XR Chest 1 View    (Results Pending)    Disposition/Treatment:  Appropriate PPE was worn during exam and throughout all encounters with the patient.  When the ED IV was placed and labs were obtained patient was placed on proper monitors presents to the ED with reports of elevated glucose and altered mental status that started at home yesterday.  EKG showed no acute STEMI labs and imaging were obtained.  Patient was found to be in DKA with Glucose 681 pH on  VBG 7.117 bicarb 18.8.  Corrected sodium 133.  DKA protocol initiated.  Also found to have acute kidney injury with a BUN of 54 creatinine 2.08.  CBC showed a white count of 23.81 likely secondary to vomiting, POC lactic normal and blood cultures are pending will cover with Zosyn for possible abdominal source of infection.  Lipase also found mildly elevated at 292.  COVID and flu were negative.  Urinalysis pending.  CT head and abdomen and pelvis pending upon admission to ICU.  Spoke to RADHA Vazquez     The high probability of sudden, clinically significant deterioration in the patient's condition required the highest level of my preparedness to intervene urgently.  The services I provided to this patient were to treat and/or prevent clinically significant deterioration that could result in: death or increase blood loss . Services included the following: chart data review, reviewing nurses notes an/or old charts, documentation time, consultant collaboration regarding findings and treatment options, vital sign assessments and ordering, interpreting and reviewing diagnostic studies/lab test.  Aggregate critical care time was 65 minutes , which includes only time during which I was engaged in work directly related to the patient's care, as described above, whether at the bedside or elsewhere in the Emergency Department. It did not include time spent performing other reported procedures.    Amount and/or Complexity of Data Reviewed  Labs: ordered. Decision-making details documented in ED Course.  Radiology: ordered. Decision-making details documented in ED Course.    Risk  Prescription drug management.        Final diagnoses:   Diabetic ketoacidosis without coma associated with type 1 diabetes mellitus   TRINA (acute kidney injury)   Generalized abdominal pain       ED Disposition  ED Disposition       ED Disposition   Decision to Admit    Condition   --    Comment   Level of Care: Critical Care [6]   Admitting Physician:  JESS BROWN [[id number]]   Attending Physician: JESS BROWN [[id number]]                 No follow-up provider specified.       Medication List      No changes were made to your prescriptions during this visit.            Shruti Villalta PA  24 1150      Electronically signed by Alex Louis DO at 24 0810       Operative/Procedure Notes (last 48 hours)  Notes from 24 1349 through 24 1349   No notes of this type exist for this encounter.       Physician Progress Notes (last 48 hours)  Notes from 24 1349 through 24 1349   No notes of this type exist for this encounter.          Consult Notes (last 48 hours)        Alex Reese MD at 24 1141        Consult Orders    1. Inpatient Hospitalist Consult [[id number]] ordered by Marie Howe APRN at 24 0908                   CONSULT    Karly Zayas : 1970 MRN:[id number] LOS:1     Reason for admission: DKA, type 1     Assessment / Plan     Diabetic ketoacidosis without coma, with history of diabetes mellitus, type 1  Etiology likely due to pump malfunction, recently switched to a new pump per spouse report.  CT of the abdomen pelvis was without intra-abdominal abnormalities, no overt signs of infection.  Anion gap of 38 on admission.  A1c 9.10, Acetone-large.  Leukocytosis without bandemia, likely reactive response to DKA.  DKA protocol initiated.  Insulin drip initiated per DKA protocol. Now given glargine by the ICU team today.   Adjust iv fluids based on glucose, sodium, and potassium per protocol.  Accuchecks every hour and serial labs as ordered.  Continue protocol until resolved per protocol recommendations.  Remove insulin pump and needle.  Upon resolution,will need the endo follow up as OP and she is  followed by Dr. Patton as outpatient.    Toxic metabolic encephalopathy  Hold all mental status altering medications.  Ct Head without acute intracranial  abnormalities.  Continue to correct electrolytes and continue IVF.    Further workup as clinically indicated.  She is not back to baseline - will get CTA head neck and MRI brain      Diabetic neuropathy: Hold gabapentin, Trileptal  Insomnia: Hold Pamelor.  Anxiety/Depression: Hold Celexa, Klonopin.    Code Status (Patient has no pulse and is not breathing): CPR (Attempt to Resuscitate)  Medical Interventions (Patient has pulse or is breathing): Full Support       Nutrition: NPO Diet NPO Type: Strict NPO     DVT Prophylaxis:   Mechanical Order History:       None          Pharmalogical Order History:        Ordered     Dose Route Frequency Stop    04/18/24 1208  heparin (porcine) 5000 UNIT/ML injection 5,000 Units         5,000 Units SC Every 8 Hours Scheduled --                     History of Present illness     Karly Zayas is a 53 y.o. female with PMH of diabetes mellitus type 1, diabetic neuropathy, anxiety/depression, and presented to the hospital for hyperglycemia, and was admitted with a principal diagnosis of DKA, type 1.  The following has been culminated from review of patient's chart, discussion with provider, and with patient's spouse, as patient is with altered mental status.  Patient had been having issues with her blood glucose, in which she had been giving herself insulin attempting to contain the problem.  She recently started a new insulin pump and apparently has been having issues with this device.  Patient was starting to also develop some increasing confusion.  There were no reported fevers or chills, dysuria, but patient does have generalized abdominal pain, nausea, and vomiting.     In the ED, patient had a venous blood gas with pH 7.117, pCO2 18.8, HCO3 6.1.  Labs were obtained with the following abnormalities: Troponin 32 with reflex of 27, sodium 124, chloride 81, CO2 5, anion gap 38, BUN 54, creatinine 2.08, glucose 681, magnesium 2.9, phosphorus 5.4, alk phos 147, A1c 9.1, acetone  large, lipase 292, osmolality 339, WBC 23.81, platelets 750.  Blood cultures were obtained.  Patient was determined to be in DKA, and was given IV fluid bolus as well as initiation of insulin drip per DKA protocol.  CT of the head was obtained due to patient's altered mental status and was without acute intracranial abnormalities.  Additionally due to patient's inability to provide good historical information and reported abdominal pain, obtained a CT of the abdomen pelvis that was without abnormalities of the abdomen or pelvis.  Intensivist service was consulted to admit patient for further treatment and evaluation.    Subjective / Review of systems     Review of Systems   She is not verbalized her need   But she is following the command   Baseline AAO 3 per the      Past Medical/Surgical/Social/Family History & Allergies     Past Medical History:   Diagnosis Date    Anxiety     Autonomic neuropathy associated with type 1 diabetes mellitus     Bipolar 2 disorder     Cataracts, bilateral     Diabetes mellitus     TYPE 1    Diabetes mellitus type I     Diabetic retinopathy associated with type 1 diabetes mellitus     Disease of thyroid gland     DKA (diabetic ketoacidoses)     Foot fracture, right     High cholesterol     Kidney stone     Macular degeneration, bilateral     Neuropathy     Pelvic pain       Past Surgical History:   Procedure Laterality Date    ANKLE SURGERY Right      SECTION      COLONOSCOPY N/A 2023    Procedure: COLONOSCOPY;  Surgeon: Lori Cleary MD;  Location: [facility] ENDOSCOPY;  Service: Gastroenterology;  Laterality: N/A;    DIAGNOSTIC LAPAROSCOPY      DIAGNOSTIC LAPAROSCOPY N/A 2017    Procedure: LAPAROSCOPY, PARTIAL LEFT SALPINGECTOMY;  Surgeon: Arleen Quintana MD;  Location:  [facility];  Service:     DILATATION AND CURETTAGE N/A 7/10/2018    Procedure: FROZEN DILATATION AND CURETTAGE POWER MORECELLATOR;  Surgeon: Nicole Shannon MD;   Location: [facility] OR;  Service: Gynecology    HYSTERECTOMY SUPRACERVICAL N/A 7/10/2018    Procedure: LAPAROSCOPIC SUPRACERVICAL HYSTERECTOMY WITH MORCELLATOR, LEFT SALPINGECTOMY;  Surgeon: Nicole Shannon MD;  Location: [facility] OR;  Service: Gynecology    HYSTEROSCOPY ENDOMETRIAL ABLATION      WITH TUBAL AND RIGHT OVARY REMOVAL    TONSILLECTOMY        Social History     Socioeconomic History    Marital status:    Tobacco Use    Smoking status: Former     Current packs/day: 0.00     Types: Cigarettes     Start date: 1992     Quit date: 2017     Years since quittin.7     Passive exposure: Past    Smokeless tobacco: Former    Tobacco comments:     Currently using vaping   Vaping Use    Vaping status: Some Days    Substances: Nicotine, Flavoring    Devices: Pre-filled or refillable cartridge   Substance and Sexual Activity    Alcohol use: No    Drug use: No    Sexual activity: Defer      Family History   Problem Relation Age of Onset    Malig Hyperthermia Neg Hx       Allergies   Allergen Reactions    Prednisone Swelling     high fever/ body aches    Nitrofurantoin Monohyd Macro Other (See Comments)     severe reaction- hospitalized        Home Medications     Prior to Admission medications    Medication Sig Start Date End Date Taking? Authorizing Provider   albuterol sulfate  (90 Base) MCG/ACT inhaler Inhale 2 puffs Every 4 (Four) Hours As Needed. 3/13/22  Yes Daisy Cullen MD   citalopram (CeleXA) 20 MG tablet Take 1 tablet by mouth Daily. 23  Yes Daisy Cullen MD   clonazePAM (KlonoPIN) 1 MG tablet Take 1 tablet by mouth Daily As Needed for Anxiety.   Yes Daisy Cullen MD   gabapentin (NEURONTIN) 600 MG tablet Take 2 tablets by mouth 3 (Three) Times a Day. 21  Yes Daisy Cullen MD   HYDROcodone-acetaminophen (NORCO)  MG per tablet Take 1 tablet by mouth Every 6 (Six) Hours As Needed. 3/1/24  Yes Daisy Cullen MD    Insulin Glargine (BASAGLAR KWIKPEN) 100 UNIT/ML injection pen Inject 19 units once daily. Use in case of pump failure. 9/26/22  Yes Nai Patton MD   Insulin Lispro (humaLOG) 100 UNIT/ML injection USE WITH INSULIN PUMP WITH A MAX DAILY DOSE  UNITS 3/22/24  Yes Nai Patton MD   nortriptyline (PAMELOR) 10 MG capsule Take 1 capsule by mouth Every Night. 2/15/24  Yes Daisy Cullen MD   ondansetron ODT (ZOFRAN-ODT) 4 MG disintegrating tablet Place 1 tablet on the tongue Every 8 (Eight) Hours As Needed. 1/19/22  Yes Daisy Cullen MD   OXcarbazepine (TRILEPTAL) 300 MG tablet Take 1 tablet by mouth 3 times a day. 1/31/23  Yes Daisy Cullen MD   SUMAtriptan (IMITREX) 50 MG tablet Take 1 tablet by mouth 1 (One) Time As Needed. 4/5/23  Yes Daisy Cullen MD   tiZANidine (ZANAFLEX) 4 MG tablet Take 1 tablet by mouth Every 8 (Eight) Hours As Needed for Muscle Spasms.   Yes Daisy Cullen MD      Objective / Physical Exam   Vital signs:  Temp: 98.4 °F (36.9 °C)  BP: 162/82  Heart Rate: 103  Resp: 22  SpO2: 98 %  Weight: 83.8 kg (184 lb 11.9 oz)    Admission Weight: Weight: 88.5 kg (195 lb 1.7 oz)    Physical Exam   Physical Exam  HENT:      Head: Normocephalic and atraumatic.      Nose: Nose normal.   Eyes:      Extraocular Movements: Extraocular movements intact.      Conjunctivae/sclera: Conjunctivae normal.      Pupils: Pupils are equal, round, and reactive to light.   Cardiovascular:      Rate and Rhythm: normal       Pulses: Normal pulses.      Heart sounds: Normal heart sounds.   Pulmonary:      Effort: normal      Breath sounds: normal   Abdominal:      General: Abdomen is flat. Bowel sounds are normal.      Palpations: Abdomen is soft.    Skin:     General: Skin is dry.   Neuro         Mood and Affect: seems confused but can follow up with the simple command but not articulate anything        Labs     Results from last 7 days   Lab Units 04/19/24  7320  04/18/24  1030   WBC 10*3/mm3 15.71* 23.81*   HEMATOCRIT % 38.3 45.4   PLATELETS 10*3/mm3 545* 750*      Results from last 7 days   Lab Units 04/19/24  1057 04/19/24  0426 04/19/24  0013 04/18/24 2018 04/18/24  1339   SODIUM mmol/L 135* 136  136 138 132* 133*   POTASSIUM mmol/L 4.1 4.1  4.1 4.4 4.6 4.4   CHLORIDE mmol/L 104 107  107 108* 103 94*   CO2 mmol/L 17.0* 16.0*  16.0* 17.0* 13.0* 12.0*   BUN mg/dL 13 20  20 28* 36* 52*   CREATININE mg/dL 0.68 0.89  0.89 0.95 1.18* 1.77*        Current Medications   Scheduled Meds:[Held by provider] citalopram, 20 mg, Oral, Daily  famotidine, 20 mg, Intravenous, Daily  [Held by provider] gabapentin, 600 mg, Oral, Q8H  heparin (porcine), 5,000 Units, Subcutaneous, Q8H  [Held by provider] OXcarbazepine, 300 mg, Oral, TID  sodium chloride, 10 mL, Intravenous, Q12H         Continuous Infusions:dextrose 5 % and sodium chloride 0.45 %, 150 mL/hr  dextrose 5 % and sodium chloride 0.45 % with KCl 20 mEq/L, 150 mL/hr, Last Rate: 150 mL/hr (04/19/24 0808)  dextrose 5 % and sodium chloride 0.45 % with KCl 40 mEq/L, 150 mL/hr  dextrose 5 % and sodium chloride 0.9 %, 150 mL/hr  dextrose 5 % and sodium chloride 0.9 % with KCl 20 mEq, 150 mL/hr, Last Rate: Stopped (04/19/24 0135)  dextrose 5% and sodium chloride 0.9% with KCl 40 mEq/L, 150 mL/hr  sodium chloride 0.45 % 1,000 mL with potassium chloride 40 mEq infusion, 250 mL/hr  sodium chloride, 250 mL/hr  sodium chloride 0.45 % with KCl 20 mEq, 250 mL/hr, Last Rate: Stopped (04/18/24 1647)  sodium chloride, 250 mL/hr  sodium chloride 0.9 % with KCl 20 mEq, 250 mL/hr, Last Rate: Stopped (04/18/24 4824)  sodium chloride 0.9 % with KCl 40 mEq/L, 250 mL/hr         Alex Reese MD  [facility] Medicine   04/19/24   11:41 EDT         Electronically signed by Alex Reese MD at 04/19/24 0265

## 2024-04-19 NOTE — CONSULTS
Picc team consult:    Procedure explained to patient.   Patient non verbal with difficult peripheral vasculature.  Power glide midline catheter placed RUE basilic vessel utilizing US guidance and sterile technique with easily compressible vessel without difficulty.  Dark venous blood return noted and line flushes without difficulty..

## 2024-04-19 NOTE — CONSULTS
CONSULT    Karly Zayas : 1970 MRN:[id number] LOS:1     Reason for admission: DKA, type 1     Assessment / Plan     Diabetic ketoacidosis without coma, with history of diabetes mellitus, type 1  Etiology likely due to pump malfunction, recently switched to a new pump per spouse report.  CT of the abdomen pelvis was without intra-abdominal abnormalities, no overt signs of infection.  Anion gap of 38 on admission.  A1c 9.10, Acetone-large.  Leukocytosis without bandemia, likely reactive response to DKA.  DKA protocol initiated.  Insulin drip initiated per DKA protocol. Now given glargine by the ICU team today.   Adjust iv fluids based on glucose, sodium, and potassium per protocol.  Accuchecks every hour and serial labs as ordered.  Continue protocol until resolved per protocol recommendations.  Remove insulin pump and needle.  Upon resolution,will need the endo follow up as OP and she is  followed by Dr. Patton as outpatient.    Toxic metabolic encephalopathy  Hold all mental status altering medications.  Ct Head without acute intracranial abnormalities.  Continue to correct electrolytes and continue IVF.    Further workup as clinically indicated.  She is not back to baseline - will get CTA head neck and MRI brain   UA to be done      Diabetic neuropathy: Hold gabapentin, Trileptal  Insomnia: Hold Pamelor.  Anxiety/Depression: Hold Celexa, Klonopin.    Code Status (Patient has no pulse and is not breathing): CPR (Attempt to Resuscitate)  Medical Interventions (Patient has pulse or is breathing): Full Support       Nutrition: NPO Diet NPO Type: Strict NPO     DVT Prophylaxis:   Mechanical Order History:       None          Pharmalogical Order History:        Ordered     Dose Route Frequency Stop    24 1208  heparin (porcine) 5000 UNIT/ML injection 5,000 Units         5,000 Units SC Every 8 Hours Scheduled --                     History of Present illness     Karly Zayas is a 53 y.o. female  with PMH of diabetes mellitus type 1, diabetic neuropathy, anxiety/depression, and presented to the hospital for hyperglycemia, and was admitted with a principal diagnosis of DKA, type 1.  The following has been culminated from review of patient's chart, discussion with provider, and with patient's spouse, as patient is with altered mental status.  Patient had been having issues with her blood glucose, in which she had been giving herself insulin attempting to contain the problem.  She recently started a new insulin pump and apparently has been having issues with this device.  Patient was starting to also develop some increasing confusion.  There were no reported fevers or chills, dysuria, but patient does have generalized abdominal pain, nausea, and vomiting.     In the ED, patient had a venous blood gas with pH 7.117, pCO2 18.8, HCO3 6.1.  Labs were obtained with the following abnormalities: Troponin 32 with reflex of 27, sodium 124, chloride 81, CO2 5, anion gap 38, BUN 54, creatinine 2.08, glucose 681, magnesium 2.9, phosphorus 5.4, alk phos 147, A1c 9.1, acetone large, lipase 292, osmolality 339, WBC 23.81, platelets 750.  Blood cultures were obtained.  Patient was determined to be in DKA, and was given IV fluid bolus as well as initiation of insulin drip per DKA protocol.  CT of the head was obtained due to patient's altered mental status and was without acute intracranial abnormalities.  Additionally due to patient's inability to provide good historical information and reported abdominal pain, obtained a CT of the abdomen pelvis that was without abnormalities of the abdomen or pelvis.  Intensivist service was consulted to admit patient for further treatment and evaluation.    Subjective / Review of systems     Review of Systems   She is not verbalized her need   But she is following the command   Baseline AAO 3 per the      Past Medical/Surgical/Social/Family History & Allergies     Past Medical  History:   Diagnosis Date    Anxiety     Autonomic neuropathy associated with type 1 diabetes mellitus     Bipolar 2 disorder     Cataracts, bilateral     Diabetes mellitus     TYPE 1    Diabetes mellitus type I     Diabetic retinopathy associated with type 1 diabetes mellitus     Disease of thyroid gland     DKA (diabetic ketoacidoses)     Foot fracture, right     High cholesterol     Kidney stone     Macular degeneration, bilateral     Neuropathy     Pelvic pain       Past Surgical History:   Procedure Laterality Date    ANKLE SURGERY Right 2015     SECTION      COLONOSCOPY N/A 2023    Procedure: COLONOSCOPY;  Surgeon: Lori Cleary MD;  Location: [facility] ENDOSCOPY;  Service: Gastroenterology;  Laterality: N/A;    DIAGNOSTIC LAPAROSCOPY      DIAGNOSTIC LAPAROSCOPY N/A 2017    Procedure: LAPAROSCOPY, PARTIAL LEFT SALPINGECTOMY;  Surgeon: Arleen Quintana MD;  Location: [facility];  Service:     DILATATION AND CURETTAGE N/A 7/10/2018    Procedure: FROZEN DILATATION AND CURETTAGE POWER MORECELLATOR;  Surgeon: Nicole Shannon MD;  Location: [facility] OR;  Service: Gynecology    HYSTERECTOMY SUPRACERVICAL N/A 7/10/2018    Procedure: LAPAROSCOPIC SUPRACERVICAL HYSTERECTOMY WITH MORCELLATOR, LEFT SALPINGECTOMY;  Surgeon: Nicole Shannon MD;  Location: [facility] OR;  Service: Gynecology    HYSTEROSCOPY ENDOMETRIAL ABLATION      WITH TUBAL AND RIGHT OVARY REMOVAL    TONSILLECTOMY        Social History     Socioeconomic History    Marital status:    Tobacco Use    Smoking status: Former     Current packs/day: 0.00     Types: Cigarettes     Start date: 1992     Quit date: 2017     Years since quittin.7     Passive exposure: Past    Smokeless tobacco: Former    Tobacco comments:     Currently using vaping   Vaping Use    Vaping status: Some Days    Substances: Nicotine, Flavoring    Devices: Pre-filled or refillable cartridge   Substance and Sexual  Activity    Alcohol use: No    Drug use: No    Sexual activity: Defer      Family History   Problem Relation Age of Onset    Malig Hyperthermia Neg Hx       Allergies   Allergen Reactions    Prednisone Swelling     high fever/ body aches    Nitrofurantoin Monohyd Macro Other (See Comments)     severe reaction- hospitalized        Home Medications     Prior to Admission medications    Medication Sig Start Date End Date Taking? Authorizing Provider   albuterol sulfate  (90 Base) MCG/ACT inhaler Inhale 2 puffs Every 4 (Four) Hours As Needed. 3/13/22  Yes Daisy Cullen MD   citalopram (CeleXA) 20 MG tablet Take 1 tablet by mouth Daily. 1/31/23  Yes Daisy Cullen MD   clonazePAM (KlonoPIN) 1 MG tablet Take 1 tablet by mouth Daily As Needed for Anxiety.   Yes Daisy Cullen MD   gabapentin (NEURONTIN) 600 MG tablet Take 2 tablets by mouth 3 (Three) Times a Day. 12/28/21  Yes Daisy Cullen MD   HYDROcodone-acetaminophen (NORCO)  MG per tablet Take 1 tablet by mouth Every 6 (Six) Hours As Needed. 3/1/24  Yes Daisy Cullen MD   Insulin Glargine (BASAGLAR KWIKPEN) 100 UNIT/ML injection pen Inject 19 units once daily. Use in case of pump failure. 9/26/22  Yes Nai Patton MD   Insulin Lispro (humaLOG) 100 UNIT/ML injection USE WITH INSULIN PUMP WITH A MAX DAILY DOSE  UNITS 3/22/24  Yes Nai Patton MD   nortriptyline (PAMELOR) 10 MG capsule Take 1 capsule by mouth Every Night. 2/15/24  Yes Daisy Cullen MD   ondansetron ODT (ZOFRAN-ODT) 4 MG disintegrating tablet Place 1 tablet on the tongue Every 8 (Eight) Hours As Needed. 1/19/22  Yes Daisy Cullen MD   OXcarbazepine (TRILEPTAL) 300 MG tablet Take 1 tablet by mouth 3 times a day. 1/31/23  Yes Daisy Cullen MD   SUMAtriptan (IMITREX) 50 MG tablet Take 1 tablet by mouth 1 (One) Time As Needed. 4/5/23  Yes Daisy Cullen MD   tiZANidine (ZANAFLEX) 4 MG tablet Take 1 tablet by  mouth Every 8 (Eight) Hours As Needed for Muscle Spasms.   Yes Provider, MD Daisy      Objective / Physical Exam   Vital signs:  Temp: 98.4 °F (36.9 °C)  BP: 162/82  Heart Rate: 103  Resp: 22  SpO2: 98 %  Weight: 83.8 kg (184 lb 11.9 oz)    Admission Weight: Weight: 88.5 kg (195 lb 1.7 oz)    Physical Exam   Physical Exam  HENT:      Head: Normocephalic and atraumatic.      Nose: Nose normal.   Eyes:      Extraocular Movements: Extraocular movements intact.      Conjunctivae/sclera: Conjunctivae normal.      Pupils: Pupils are equal, round, and reactive to light.   Cardiovascular:      Rate and Rhythm: normal       Pulses: Normal pulses.      Heart sounds: Normal heart sounds.   Pulmonary:      Effort: normal      Breath sounds: normal   Abdominal:      General: Abdomen is flat. Bowel sounds are normal.      Palpations: Abdomen is soft.    Skin:     General: Skin is dry.   Neuro         Mood and Affect: seems confused but can follow up with the simple command but not articulate anything        Labs     Results from last 7 days   Lab Units 04/19/24  0426 04/18/24  1030   WBC 10*3/mm3 15.71* 23.81*   HEMATOCRIT % 38.3 45.4   PLATELETS 10*3/mm3 545* 750*      Results from last 7 days   Lab Units 04/19/24  1057 04/19/24  0426 04/19/24  0013 04/18/24 2018 04/18/24  1339   SODIUM mmol/L 135* 136  136 138 132* 133*   POTASSIUM mmol/L 4.1 4.1  4.1 4.4 4.6 4.4   CHLORIDE mmol/L 104 107  107 108* 103 94*   CO2 mmol/L 17.0* 16.0*  16.0* 17.0* 13.0* 12.0*   BUN mg/dL 13 20  20 28* 36* 52*   CREATININE mg/dL 0.68 0.89  0.89 0.95 1.18* 1.77*        Current Medications   Scheduled Meds:[Held by provider] citalopram, 20 mg, Oral, Daily  famotidine, 20 mg, Intravenous, Daily  [Held by provider] gabapentin, 600 mg, Oral, Q8H  heparin (porcine), 5,000 Units, Subcutaneous, Q8H  [Held by provider] OXcarbazepine, 300 mg, Oral, TID  sodium chloride, 10 mL, Intravenous, Q12H         Continuous Infusions:dextrose 5 % and sodium  chloride 0.45 %, 150 mL/hr  dextrose 5 % and sodium chloride 0.45 % with KCl 20 mEq/L, 150 mL/hr, Last Rate: 150 mL/hr (04/19/24 0808)  dextrose 5 % and sodium chloride 0.45 % with KCl 40 mEq/L, 150 mL/hr  dextrose 5 % and sodium chloride 0.9 %, 150 mL/hr  dextrose 5 % and sodium chloride 0.9 % with KCl 20 mEq, 150 mL/hr, Last Rate: Stopped (04/19/24 0135)  dextrose 5% and sodium chloride 0.9% with KCl 40 mEq/L, 150 mL/hr  sodium chloride 0.45 % 1,000 mL with potassium chloride 40 mEq infusion, 250 mL/hr  sodium chloride, 250 mL/hr  sodium chloride 0.45 % with KCl 20 mEq, 250 mL/hr, Last Rate: Stopped (04/18/24 1647)  sodium chloride, 250 mL/hr  sodium chloride 0.9 % with KCl 20 mEq, 250 mL/hr, Last Rate: Stopped (04/18/24 1524)  sodium chloride 0.9 % with KCl 40 mEq/L, 250 mL/hr         Alex Reese MD  [facility]   04/19/24   11:41 EDT

## 2024-04-19 NOTE — CASE MANAGEMENT/SOCIAL WORK
----- Message from Margie Chen sent at 4/25/2023 11:05 AM EDT -----  Contact: [phone]  MOM WOULD LIKE TO TALK TO YOU ABOUT MEDICATIONS WANTS TO RESTART MEDS SHE HAS AT HOME BUT NOT SURE WHICH ONE     Discharge Planning Assessment   Ismael     Patient Name: Karly Zayas  MRN: [id number]  Today's Date: 4/19/2024    Admit Date: 4/18/2024    Plan: Plan to return home with spouse.   Discharge Needs Assessment       Row Name 04/19/24 1012       Living Environment    People in Home spouse;child(art), dependent    Name(s) of People in Home Jose Luis - spouse, 15 yr old autisic daughter    Current Living Arrangements home    Potentially Unsafe Housing Conditions none    In the past 12 months has the electric, gas, oil, or water company threatened to shut off services in your home? No    Primary Care Provided by self    Provides Primary Care For no one    Family Caregiver if Needed spouse    Quality of Family Relationships helpful;involved;supportive    Able to Return to Prior Arrangements yes       Resource/Environmental Concerns    Resource/Environmental Concerns none    Transportation Concerns none       Transportation Needs    In the past 12 months, has lack of transportation kept you from medical appointments or from getting medications? no    In the past 12 months, has lack of transportation kept you from meetings, work, or from getting things needed for daily living? No       Food Insecurity    Within the past 12 months, you worried that your food would run out before you got the money to buy more. Never true    Within the past 12 months, the food you bought just didn't last and you didn't have money to get more. Never true       Transition Planning    Patient/Family Anticipates Transition to home with family    Patient/Family Anticipated Services at Transition none    Transportation Anticipated car, drives self;family or friend will provide       Discharge Needs Assessment    Readmission Within the Last 30 Days no previous admission in last 30 days    Equipment Currently Used at Home none    Concerns to be Addressed discharge planning    Anticipated Changes Related to Illness none    Equipment Needed After  Discharge none                   Discharge Plan       Row Name 04/19/24 1013       Plan    Plan Plan to return home with spouse.    Patient/Family in Agreement with Plan yes    Plan Comments Patient lives at home with spouse and 15 yr old autistic daughter.  Spouse will transport at discharge. Patient performs IADLs. PCP and pharmacy confirmed. Declines M2B.  Denies financial assistance needs for medication and/or food. Denies any current DME, HH, Caregiver, or rehab services.  DC Barriers:  NPO, Insulin gtt, DM educator following.               Expected Discharge Date and Time       Expected Discharge Date Expected Discharge Time    Apr 20, 2024            Demographic Summary       Row Name 04/19/24 1011       General Information    Arrived From emergency department    Referral Source admission list    Reason for Consult discharge planning    Preferred Language English                   Functional Status       Row Name 04/19/24 1012       Functional Status    Usual Activity Tolerance excellent    Current Activity Tolerance fair       Functional Status, IADL    Medications independent    Meal Preparation independent    Housekeeping independent    Laundry independent    Shopping independent       Mental Status    General Appearance WDL WDL       Mental Status Summary    Recent Changes in Mental Status/Cognitive Functioning no changes           COOKIE Muhammad RN  SIPS/ICU   O: [phone]  C: [phone]  [email or web address]

## 2024-04-19 NOTE — PLAN OF CARE
Goal Outcome Evaluation:      Pt remained in insulin gtt entire shift, with blood glucose around 150s. BP high for majority of shift, other than this vital signs stable entire shift. Pt remains with altered mentation, not responding verbally at this time, just moans. Pt had good urine output. Pt status stable entire shift.

## 2024-04-19 NOTE — CONSULTS
Diabetes Education    Patient Name:  Karly Zayas  YOB: 1970  MRN: [id number]  Admit Date:  4/18/2024        MD consult for DKA, admission blood sugar 681, and on 4/18/2024 A1c was 9.1%. Admission labs were pH 7.117, CO2 5.0, Anion gap 38.0, and acetone large. Patient has a Tandem T-slim insulin pump at bedside. Patient did not reply when asked name and birth date and only stares. Will attempt follow-up with patient when patient appropriate for education.      Electronically signed by:  Fauzia Matias RN  04/19/24 17:23 EDT

## 2024-04-19 NOTE — PLAN OF CARE
Goal Outcome Evaluation:           Patient will respond to voice and follow very simple commands but when asked any orientation question will not respond. CT and MRI have been ordered, waiting for time slot to take patient down on MRI. Remains on room air. Patient is on insulin gtt and corrected sodium fluids at this time.

## 2024-04-20 ENCOUNTER — APPOINTMENT (OUTPATIENT)
Dept: CARDIOLOGY | Facility: HOSPITAL | Age: 54
DRG: 637 | End: 2024-04-20
Payer: COMMERCIAL

## 2024-04-20 PROBLEM — I63.9 ACUTE CVA (CEREBROVASCULAR ACCIDENT): Status: ACTIVE | Noted: 2024-04-20

## 2024-04-20 PROBLEM — I10 HYPERTENSION, UNCONTROLLED: Status: ACTIVE | Noted: 2024-04-20

## 2024-04-20 LAB
ALBUMIN SERPL-MCNC: 3.6 G/DL (ref 3.5–5.2)
ALBUMIN/GLOB SERPL: 1.2 G/DL
ALP SERPL-CCNC: 106 U/L (ref 39–117)
ALT SERPL W P-5'-P-CCNC: 9 U/L (ref 1–33)
ANION GAP SERPL CALCULATED.3IONS-SCNC: 13 MMOL/L (ref 5–15)
ANION GAP SERPL CALCULATED.3IONS-SCNC: 16 MMOL/L (ref 5–15)
ANION GAP SERPL CALCULATED.3IONS-SCNC: 18 MMOL/L (ref 5–15)
ANION GAP SERPL CALCULATED.3IONS-SCNC: 21 MMOL/L (ref 5–15)
ANION GAP SERPL CALCULATED.3IONS-SCNC: 21 MMOL/L (ref 5–15)
AST SERPL-CCNC: 10 U/L (ref 1–32)
BASOPHILS # BLD AUTO: 0.01 10*3/MM3 (ref 0–0.2)
BASOPHILS NFR BLD AUTO: 0.1 % (ref 0–1.5)
BH CV ECHO MEAS - AO MAX PG: 8.8 MMHG
BH CV ECHO MEAS - AO MEAN PG: 5 MMHG
BH CV ECHO MEAS - AO V2 MAX: 148 CM/SEC
BH CV ECHO MEAS - AO V2 VTI: 25.7 CM
BH CV ECHO MEAS - AVA(I,D): 2.03 CM2
BH CV ECHO MEAS - EDV(CUBED): 79.5 ML
BH CV ECHO MEAS - EDV(MOD-SP2): 69.2 ML
BH CV ECHO MEAS - EDV(MOD-SP4): 68.8 ML
BH CV ECHO MEAS - EF(MOD-BP): 54.9 %
BH CV ECHO MEAS - EF(MOD-SP2): 54.5 %
BH CV ECHO MEAS - EF(MOD-SP4): 56.1 %
BH CV ECHO MEAS - ESV(CUBED): 22 ML
BH CV ECHO MEAS - ESV(MOD-SP2): 31.5 ML
BH CV ECHO MEAS - ESV(MOD-SP4): 30.2 ML
BH CV ECHO MEAS - FS: 34.9 %
BH CV ECHO MEAS - IVS/LVPW: 0.91 CM
BH CV ECHO MEAS - IVSD: 1 CM
BH CV ECHO MEAS - LA DIMENSION: 3.2 CM
BH CV ECHO MEAS - LAT PEAK E' VEL: 8.4 CM/SEC
BH CV ECHO MEAS - LV DIASTOLIC VOL/BSA (35-75): 34.7 CM2
BH CV ECHO MEAS - LV MASS(C)D: 152.6 GRAMS
BH CV ECHO MEAS - LV MAX PG: 3.2 MMHG
BH CV ECHO MEAS - LV MEAN PG: 2 MMHG
BH CV ECHO MEAS - LV SYSTOLIC VOL/BSA (12-30): 15.2 CM2
BH CV ECHO MEAS - LV V1 MAX: 89.4 CM/SEC
BH CV ECHO MEAS - LV V1 VTI: 16.6 CM
BH CV ECHO MEAS - LVIDD: 4.3 CM
BH CV ECHO MEAS - LVIDS: 2.8 CM
BH CV ECHO MEAS - LVOT AREA: 3.1 CM2
BH CV ECHO MEAS - LVOT DIAM: 2 CM
BH CV ECHO MEAS - LVPWD: 1.1 CM
BH CV ECHO MEAS - MED PEAK E' VEL: 7.4 CM/SEC
BH CV ECHO MEAS - MV A MAX VEL: 112 CM/SEC
BH CV ECHO MEAS - MV DEC SLOPE: 592 CM/SEC2
BH CV ECHO MEAS - MV DEC TIME: 0.17 SEC
BH CV ECHO MEAS - MV E MAX VEL: 114 CM/SEC
BH CV ECHO MEAS - MV E/A: 1.02
BH CV ECHO MEAS - MV MAX PG: 4.4 MMHG
BH CV ECHO MEAS - MV MEAN PG: 2 MMHG
BH CV ECHO MEAS - MV P1/2T: 50 MSEC
BH CV ECHO MEAS - MV V2 VTI: 19.5 CM
BH CV ECHO MEAS - MVA(P1/2T): 4.4 CM2
BH CV ECHO MEAS - MVA(VTI): 2.7 CM2
BH CV ECHO MEAS - PA V2 MAX: 96.8 CM/SEC
BH CV ECHO MEAS - PULM A REVS DUR: 0.1 SEC
BH CV ECHO MEAS - PULM A REVS VEL: 32 CM/SEC
BH CV ECHO MEAS - PULM DIAS VEL: 46.8 CM/SEC
BH CV ECHO MEAS - PULM S/D: 1.44
BH CV ECHO MEAS - PULM SYS VEL: 67.5 CM/SEC
BH CV ECHO MEAS - RAP SYSTOLE: 3 MMHG
BH CV ECHO MEAS - RV MAX PG: 2.25 MMHG
BH CV ECHO MEAS - RV V1 MAX: 75 CM/SEC
BH CV ECHO MEAS - RV V1 VTI: 14.9 CM
BH CV ECHO MEAS - RVDD: 2.3 CM
BH CV ECHO MEAS - RVSP: 26.8 MMHG
BH CV ECHO MEAS - SV(LVOT): 52.2 ML
BH CV ECHO MEAS - SV(MOD-SP2): 37.7 ML
BH CV ECHO MEAS - SV(MOD-SP4): 38.6 ML
BH CV ECHO MEAS - SVI(MOD-SP2): 19 ML/M2
BH CV ECHO MEAS - SVI(MOD-SP4): 19.5 ML/M2
BH CV ECHO MEAS - TAPSE (>1.6): 2.17 CM
BH CV ECHO MEAS - TR MAX PG: 23.8 MMHG
BH CV ECHO MEAS - TR MAX VEL: 244 CM/SEC
BH CV ECHO MEASUREMENTS AVERAGE E/E' RATIO: 14.43
BH CV XLRA - RV BASE: 3.3 CM
BH CV XLRA - RV LENGTH: 6.3 CM
BH CV XLRA - RV MID: 2.7 CM
BH CV XLRA - TDI S': 16.6 CM/SEC
BILIRUB SERPL-MCNC: 0.4 MG/DL (ref 0–1.2)
BUN SERPL-MCNC: 7 MG/DL (ref 6–20)
BUN SERPL-MCNC: 8 MG/DL (ref 6–20)
BUN/CREAT SERPL: 13.1 (ref 7–25)
BUN/CREAT SERPL: 13.5 (ref 7–25)
BUN/CREAT SERPL: 13.8 (ref 7–25)
CALCIUM SPEC-SCNC: 8.4 MG/DL (ref 8.6–10.5)
CALCIUM SPEC-SCNC: 8.5 MG/DL (ref 8.6–10.5)
CALCIUM SPEC-SCNC: 8.7 MG/DL (ref 8.6–10.5)
CALCIUM SPEC-SCNC: 8.7 MG/DL (ref 8.6–10.5)
CALCIUM SPEC-SCNC: 9.1 MG/DL (ref 8.6–10.5)
CHLORIDE SERPL-SCNC: 101 MMOL/L (ref 98–107)
CHLORIDE SERPL-SCNC: 103 MMOL/L (ref 98–107)
CHLORIDE SERPL-SCNC: 103 MMOL/L (ref 98–107)
CHLORIDE SERPL-SCNC: 104 MMOL/L (ref 98–107)
CHLORIDE SERPL-SCNC: 106 MMOL/L (ref 98–107)
CHOLEST SERPL-MCNC: 189 MG/DL (ref 0–200)
CO2 SERPL-SCNC: 14 MMOL/L (ref 22–29)
CO2 SERPL-SCNC: 14 MMOL/L (ref 22–29)
CO2 SERPL-SCNC: 15 MMOL/L (ref 22–29)
CO2 SERPL-SCNC: 19 MMOL/L (ref 22–29)
CO2 SERPL-SCNC: 20 MMOL/L (ref 22–29)
CREAT SERPL-MCNC: 0.52 MG/DL (ref 0.57–1)
CREAT SERPL-MCNC: 0.58 MG/DL (ref 0.57–1)
CREAT SERPL-MCNC: 0.61 MG/DL (ref 0.57–1)
DEPRECATED RDW RBC AUTO: 43.3 FL (ref 37–54)
EGFRCR SERPLBLD CKD-EPI 2021: 107.1 ML/MIN/1.73
EGFRCR SERPLBLD CKD-EPI 2021: 108.4 ML/MIN/1.73
EGFRCR SERPLBLD CKD-EPI 2021: 111.3 ML/MIN/1.73
EOSINOPHIL # BLD AUTO: 0 10*3/MM3 (ref 0–0.4)
EOSINOPHIL NFR BLD AUTO: 0 % (ref 0.3–6.2)
ERYTHROCYTE [DISTWIDTH] IN BLOOD BY AUTOMATED COUNT: 14.1 % (ref 12.3–15.4)
GLOBULIN UR ELPH-MCNC: 3.1 GM/DL
GLUCOSE BLDC GLUCOMTR-MCNC: 176 MG/DL (ref 70–105)
GLUCOSE BLDC GLUCOMTR-MCNC: 266 MG/DL (ref 70–105)
GLUCOSE BLDC GLUCOMTR-MCNC: 274 MG/DL (ref 70–105)
GLUCOSE BLDC GLUCOMTR-MCNC: 313 MG/DL (ref 70–105)
GLUCOSE BLDC GLUCOMTR-MCNC: 67 MG/DL (ref 70–105)
GLUCOSE BLDC GLUCOMTR-MCNC: 72 MG/DL (ref 70–105)
GLUCOSE BLDC GLUCOMTR-MCNC: 83 MG/DL (ref 70–105)
GLUCOSE BLDC GLUCOMTR-MCNC: 93 MG/DL (ref 70–105)
GLUCOSE BLDC GLUCOMTR-MCNC: 97 MG/DL (ref 70–105)
GLUCOSE SERPL-MCNC: 120 MG/DL (ref 65–99)
GLUCOSE SERPL-MCNC: 272 MG/DL (ref 65–99)
GLUCOSE SERPL-MCNC: 289 MG/DL (ref 65–99)
GLUCOSE SERPL-MCNC: 332 MG/DL (ref 65–99)
GLUCOSE SERPL-MCNC: 332 MG/DL (ref 65–99)
HCT VFR BLD AUTO: 36.8 % (ref 34–46.6)
HDLC SERPL-MCNC: 65 MG/DL (ref 40–60)
HGB BLD-MCNC: 11.8 G/DL (ref 12–15.9)
IMM GRANULOCYTES # BLD AUTO: 0.07 10*3/MM3 (ref 0–0.05)
IMM GRANULOCYTES NFR BLD AUTO: 0.7 % (ref 0–0.5)
KETONES UR QL STRIP: ABNORMAL
LDLC SERPL CALC-MCNC: 96 MG/DL (ref 0–100)
LDLC/HDLC SERPL: 1.41 {RATIO}
LEFT ATRIUM VOLUME INDEX: 17.1 ML/M2
LYMPHOCYTES # BLD AUTO: 1.66 10*3/MM3 (ref 0.7–3.1)
LYMPHOCYTES NFR BLD AUTO: 16.4 % (ref 19.6–45.3)
MAGNESIUM SERPL-MCNC: 2.1 MG/DL (ref 1.6–2.6)
MAGNESIUM SERPL-MCNC: 2.2 MG/DL (ref 1.6–2.6)
MAGNESIUM SERPL-MCNC: 2.3 MG/DL (ref 1.6–2.6)
MAGNESIUM SERPL-MCNC: 2.3 MG/DL (ref 1.6–2.6)
MCH RBC QN AUTO: 26.9 PG (ref 26.6–33)
MCHC RBC AUTO-ENTMCNC: 32.1 G/DL (ref 31.5–35.7)
MCV RBC AUTO: 83.8 FL (ref 79–97)
MONOCYTES # BLD AUTO: 0.68 10*3/MM3 (ref 0.1–0.9)
MONOCYTES NFR BLD AUTO: 6.7 % (ref 5–12)
NEUTROPHILS NFR BLD AUTO: 7.73 10*3/MM3 (ref 1.7–7)
NEUTROPHILS NFR BLD AUTO: 76.1 % (ref 42.7–76)
NRBC BLD AUTO-RTO: 0 /100 WBC (ref 0–0.2)
PHOSPHATE SERPL-MCNC: 1.9 MG/DL (ref 2.5–4.5)
PHOSPHATE SERPL-MCNC: 1.9 MG/DL (ref 2.5–4.5)
PHOSPHATE SERPL-MCNC: 2 MG/DL (ref 2.5–4.5)
PHOSPHATE SERPL-MCNC: 2.6 MG/DL (ref 2.5–4.5)
PLATELET # BLD AUTO: 474 10*3/MM3 (ref 140–450)
PMV BLD AUTO: 9.3 FL (ref 6–12)
POTASSIUM SERPL-SCNC: 3.6 MMOL/L (ref 3.5–5.2)
POTASSIUM SERPL-SCNC: 3.7 MMOL/L (ref 3.5–5.2)
POTASSIUM SERPL-SCNC: 3.9 MMOL/L (ref 3.5–5.2)
POTASSIUM SERPL-SCNC: 4.1 MMOL/L (ref 3.5–5.2)
POTASSIUM SERPL-SCNC: 4.1 MMOL/L (ref 3.5–5.2)
PROT SERPL-MCNC: 6.7 G/DL (ref 6–8.5)
RBC # BLD AUTO: 4.39 10*6/MM3 (ref 3.77–5.28)
SINUS: 2.9 CM
SODIUM SERPL-SCNC: 134 MMOL/L (ref 136–145)
SODIUM SERPL-SCNC: 138 MMOL/L (ref 136–145)
SODIUM SERPL-SCNC: 140 MMOL/L (ref 136–145)
TRIGL SERPL-MCNC: 161 MG/DL (ref 0–150)
VIT B12 BLD-MCNC: 883 PG/ML (ref 211–946)
VLDLC SERPL-MCNC: 28 MG/DL (ref 5–40)
WBC NRBC COR # BLD AUTO: 10.15 10*3/MM3 (ref 3.4–10.8)
WHOLE BLOOD HOLD SPECIMEN: NORMAL

## 2024-04-20 PROCEDURE — 84100 ASSAY OF PHOSPHORUS: CPT | Performed by: NURSE PRACTITIONER

## 2024-04-20 PROCEDURE — 80053 COMPREHEN METABOLIC PANEL: CPT | Performed by: NURSE PRACTITIONER

## 2024-04-20 PROCEDURE — 63710000001 INSULIN LISPRO (HUMAN) PER 5 UNITS: Performed by: INTERNAL MEDICINE

## 2024-04-20 PROCEDURE — 25810000003 SODIUM CHLORIDE 0.9 % SOLUTION: Performed by: HOSPITALIST

## 2024-04-20 PROCEDURE — 25010000002 ONDANSETRON PER 1 MG: Performed by: NURSE PRACTITIONER

## 2024-04-20 PROCEDURE — 25810000003 SODIUM CHLORIDE 0.9 % SOLUTION: Performed by: STUDENT IN AN ORGANIZED HEALTH CARE EDUCATION/TRAINING PROGRAM

## 2024-04-20 PROCEDURE — 84100 ASSAY OF PHOSPHORUS: CPT | Performed by: HOSPITALIST

## 2024-04-20 PROCEDURE — 93306 TTE W/DOPPLER COMPLETE: CPT

## 2024-04-20 PROCEDURE — 85025 COMPLETE CBC W/AUTO DIFF WBC: CPT | Performed by: NURSE PRACTITIONER

## 2024-04-20 PROCEDURE — 25010000002 LABETALOL 5 MG/ML SOLUTION: Performed by: INTERNAL MEDICINE

## 2024-04-20 PROCEDURE — 63710000001 INSULIN GLARGINE PER 5 UNITS: Performed by: INTERNAL MEDICINE

## 2024-04-20 PROCEDURE — C1751 CATH, INF, PER/CENT/MIDLINE: HCPCS

## 2024-04-20 PROCEDURE — 82948 REAGENT STRIP/BLOOD GLUCOSE: CPT

## 2024-04-20 PROCEDURE — 99222 1ST HOSP IP/OBS MODERATE 55: CPT | Performed by: INTERNAL MEDICINE

## 2024-04-20 PROCEDURE — 83735 ASSAY OF MAGNESIUM: CPT | Performed by: NURSE PRACTITIONER

## 2024-04-20 PROCEDURE — 93306 TTE W/DOPPLER COMPLETE: CPT | Performed by: INTERNAL MEDICINE

## 2024-04-20 PROCEDURE — 82607 VITAMIN B-12: CPT | Performed by: STUDENT IN AN ORGANIZED HEALTH CARE EDUCATION/TRAINING PROGRAM

## 2024-04-20 PROCEDURE — 80061 LIPID PANEL: CPT | Performed by: STUDENT IN AN ORGANIZED HEALTH CARE EDUCATION/TRAINING PROGRAM

## 2024-04-20 PROCEDURE — 25010000002 HEPARIN (PORCINE) PER 1000 UNITS: Performed by: NURSE PRACTITIONER

## 2024-04-20 PROCEDURE — 81003 URINALYSIS AUTO W/O SCOPE: CPT | Performed by: INTERNAL MEDICINE

## 2024-04-20 PROCEDURE — 63710000001 INSULIN LISPRO (HUMAN) PER 5 UNITS: Performed by: STUDENT IN AN ORGANIZED HEALTH CARE EDUCATION/TRAINING PROGRAM

## 2024-04-20 PROCEDURE — 63710000001 ONDANSETRON ODT 4 MG TABLET DISPERSIBLE: Performed by: NURSE PRACTITIONER

## 2024-04-20 PROCEDURE — 92610 EVALUATE SWALLOWING FUNCTION: CPT

## 2024-04-20 PROCEDURE — 82948 REAGENT STRIP/BLOOD GLUCOSE: CPT | Performed by: STUDENT IN AN ORGANIZED HEALTH CARE EDUCATION/TRAINING PROGRAM

## 2024-04-20 RX ORDER — FENTANYL/ROPIVACAINE/NS/PF 2-625MCG/1
15 PLASTIC BAG, INJECTION (ML) EPIDURAL ONCE
Status: COMPLETED | OUTPATIENT
Start: 2024-04-20 | End: 2024-04-21

## 2024-04-20 RX ORDER — INSULIN LISPRO 100 [IU]/ML
8 INJECTION, SOLUTION INTRAVENOUS; SUBCUTANEOUS ONCE
Status: COMPLETED | OUTPATIENT
Start: 2024-04-20 | End: 2024-04-20

## 2024-04-20 RX ORDER — IBUPROFEN 600 MG/1
1 TABLET ORAL
Status: DISCONTINUED | OUTPATIENT
Start: 2024-04-20 | End: 2024-04-20

## 2024-04-20 RX ORDER — INSULIN LISPRO 100 [IU]/ML
8 INJECTION, SOLUTION INTRAVENOUS; SUBCUTANEOUS
Status: DISCONTINUED | OUTPATIENT
Start: 2024-04-21 | End: 2024-04-21

## 2024-04-20 RX ORDER — FENTANYL/ROPIVACAINE/NS/PF 2-625MCG/1
15 PLASTIC BAG, INJECTION (ML) EPIDURAL ONCE
Qty: 250 ML | Refills: 0 | Status: COMPLETED | OUTPATIENT
Start: 2024-04-20 | End: 2024-04-20

## 2024-04-20 RX ORDER — TIZANIDINE 4 MG/1
4 TABLET ORAL EVERY 8 HOURS PRN
Status: DISCONTINUED | OUTPATIENT
Start: 2024-04-20 | End: 2024-04-22 | Stop reason: HOSPADM

## 2024-04-20 RX ORDER — INSULIN LISPRO 100 [IU]/ML
10 INJECTION, SOLUTION INTRAVENOUS; SUBCUTANEOUS
Status: DISCONTINUED | OUTPATIENT
Start: 2024-04-20 | End: 2024-04-20

## 2024-04-20 RX ORDER — DEXTROSE AND SODIUM CHLORIDE 5; .45 G/100ML; G/100ML
75 INJECTION, SOLUTION INTRAVENOUS CONTINUOUS
Status: DISCONTINUED | OUTPATIENT
Start: 2024-04-20 | End: 2024-04-22 | Stop reason: HOSPADM

## 2024-04-20 RX ORDER — DEXTROSE MONOHYDRATE 25 G/50ML
25 INJECTION, SOLUTION INTRAVENOUS
Status: DISCONTINUED | OUTPATIENT
Start: 2024-04-20 | End: 2024-04-21

## 2024-04-20 RX ORDER — INSULIN LISPRO 100 [IU]/ML
2-9 INJECTION, SOLUTION INTRAVENOUS; SUBCUTANEOUS
Status: DISCONTINUED | OUTPATIENT
Start: 2024-04-20 | End: 2024-04-21

## 2024-04-20 RX ORDER — IBUPROFEN 600 MG/1
1 TABLET ORAL
Status: DISCONTINUED | OUTPATIENT
Start: 2024-04-20 | End: 2024-04-21

## 2024-04-20 RX ORDER — NICOTINE POLACRILEX 4 MG
15 LOZENGE BUCCAL
Status: DISCONTINUED | OUTPATIENT
Start: 2024-04-20 | End: 2024-04-20

## 2024-04-20 RX ORDER — DEXTROSE MONOHYDRATE 25 G/50ML
10-50 INJECTION, SOLUTION INTRAVENOUS
Status: DISCONTINUED | OUTPATIENT
Start: 2024-04-20 | End: 2024-04-20

## 2024-04-20 RX ORDER — NICOTINE POLACRILEX 4 MG
15 LOZENGE BUCCAL
Status: DISCONTINUED | OUTPATIENT
Start: 2024-04-20 | End: 2024-04-21

## 2024-04-20 RX ORDER — INSULIN LISPRO 100 [IU]/ML
1-200 INJECTION, SOLUTION INTRAVENOUS; SUBCUTANEOUS AS NEEDED
Status: DISCONTINUED | OUTPATIENT
Start: 2024-04-20 | End: 2024-04-20

## 2024-04-20 RX ORDER — INSULIN LISPRO 100 [IU]/ML
1-200 INJECTION, SOLUTION INTRAVENOUS; SUBCUTANEOUS
Status: DISCONTINUED | OUTPATIENT
Start: 2024-04-20 | End: 2024-04-20

## 2024-04-20 RX ORDER — LISINOPRIL 5 MG/1
10 TABLET ORAL
Status: DISCONTINUED | OUTPATIENT
Start: 2024-04-20 | End: 2024-04-22 | Stop reason: HOSPADM

## 2024-04-20 RX ADMIN — HEPARIN SODIUM 5000 UNITS: 5000 INJECTION INTRAVENOUS; SUBCUTANEOUS at 22:42

## 2024-04-20 RX ADMIN — Medication 10 ML: at 22:20

## 2024-04-20 RX ADMIN — Medication 10 ML: at 08:06

## 2024-04-20 RX ADMIN — ONDANSETRON 4 MG: 2 INJECTION INTRAMUSCULAR; INTRAVENOUS at 08:06

## 2024-04-20 RX ADMIN — INSULIN LISPRO 4 UNITS: 100 INJECTION, SOLUTION INTRAVENOUS; SUBCUTANEOUS at 12:47

## 2024-04-20 RX ADMIN — POTASSIUM PHOSPHATE, MONOBASIC AND POTASSIUM PHOSPHATE, DIBASIC 15 MMOL: 224; 236 INJECTION, SOLUTION, CONCENTRATE INTRAVENOUS at 22:42

## 2024-04-20 RX ADMIN — INSULIN LISPRO 8 UNITS: 100 INJECTION, SOLUTION INTRAVENOUS; SUBCUTANEOUS at 16:51

## 2024-04-20 RX ADMIN — POTASSIUM PHOSPHATE, MONOBASIC AND POTASSIUM PHOSPHATE, DIBASIC 15 MMOL: 224; 236 INJECTION, SOLUTION, CONCENTRATE INTRAVENOUS at 12:19

## 2024-04-20 RX ADMIN — SODIUM CHLORIDE 1000 ML: 9 INJECTION, SOLUTION INTRAVENOUS at 05:49

## 2024-04-20 RX ADMIN — INSULIN LISPRO 10 UNITS: 100 INJECTION, SOLUTION INTRAVENOUS; SUBCUTANEOUS at 18:19

## 2024-04-20 RX ADMIN — FAMOTIDINE 20 MG: 10 INJECTION INTRAVENOUS at 08:06

## 2024-04-20 RX ADMIN — GABAPENTIN 600 MG: 300 CAPSULE ORAL at 14:18

## 2024-04-20 RX ADMIN — INSULIN LISPRO 7 UNITS: 100 INJECTION, SOLUTION INTRAVENOUS; SUBCUTANEOUS at 18:20

## 2024-04-20 RX ADMIN — ALUMINUM HYDROXIDE, MAGNESIUM HYDROXIDE, AND SIMETHICONE 15 ML: 2400; 240; 2400 SUSPENSION ORAL at 15:02

## 2024-04-20 RX ADMIN — DEXTROSE MONOHYDRATE 25 G: 25 INJECTION, SOLUTION INTRAVENOUS at 21:03

## 2024-04-20 RX ADMIN — ASPIRIN 300 MG: 300 SUPPOSITORY RECTAL at 00:19

## 2024-04-20 RX ADMIN — DEXTROSE AND SODIUM CHLORIDE 100 ML/HR: 5; 450 INJECTION, SOLUTION INTRAVENOUS at 22:42

## 2024-04-20 RX ADMIN — INSULIN GLARGINE 20 UNITS: 100 INJECTION, SOLUTION SUBCUTANEOUS at 16:51

## 2024-04-20 RX ADMIN — OXCARBAZEPINE 300 MG: 600 TABLET, FILM COATED ORAL at 14:18

## 2024-04-20 RX ADMIN — Medication 10 MG: at 07:46

## 2024-04-20 RX ADMIN — HEPARIN SODIUM 5000 UNITS: 5000 INJECTION INTRAVENOUS; SUBCUTANEOUS at 14:18

## 2024-04-20 RX ADMIN — INSULIN LISPRO 4 UNITS: 100 INJECTION, SOLUTION INTRAVENOUS; SUBCUTANEOUS at 05:48

## 2024-04-20 RX ADMIN — LISINOPRIL 10 MG: 5 TABLET ORAL at 12:19

## 2024-04-20 RX ADMIN — ONDANSETRON 4 MG: 4 TABLET, ORALLY DISINTEGRATING ORAL at 14:18

## 2024-04-20 RX ADMIN — Medication 10 MG: at 01:08

## 2024-04-20 RX ADMIN — HEPARIN SODIUM 5000 UNITS: 5000 INJECTION INTRAVENOUS; SUBCUTANEOUS at 05:49

## 2024-04-20 NOTE — THERAPY EVALUATION
Acute Care - Speech Language Pathology   Swallow Initial Evaluation  Ismael     Patient Name: Karly Zayas  : 1970  MRN: [id number]  Today's Date: 2024               Admit Date: 2024    Visit Dx:     ICD-10-CM ICD-9-CM   1. Diabetic ketoacidosis without coma associated with type 1 diabetes mellitus  E10.10 250.13   2. TRINA (acute kidney injury)  N17.9 584.9   3. Generalized abdominal pain  R10.84 789.07     Patient Active Problem List   Diagnosis    Pelvic pain    Abrasion    Chronic back pain    Closed fracture of head of metatarsal bone    Contusion of right foot    DDD (degenerative disc disease), lumbar    Depression    Goiter    Hand paresthesia    Hyperlipidemia, mixed    Insulin pump status    Myofascial pain    Neuritis of foot    Obesity    Orthopedic aftercare    Encounter for immunization    Status post epidural steroid injection    Type 1 diabetes mellitus with diabetic neuropathy    Type 1 diabetes mellitus with hyperglycemia    Vitamin D deficiency    Intermediate stage nonexudative age-related macular degeneration of both eyes    Nuclear sclerotic cataract of both eyes    DKA, type 1    Acute CVA (cerebrovascular accident)    Hypertension, uncontrolled     SLP Recommendation and Plan  SLP Swallowing Diagnosis: swallow WFL/no suspected pharyngeal impairment (24 1045)  SLP Diet Recommendation: regular textures, thin liquids (24 104)  Recommended Precautions and Strategies: upright posture during/after eating, small bites of food and sips of liquid, multiple swallows per bite of food, multiple swallows per sip of liquid, alternate between small bites of food and sips of liquid (24 1045)  SLP Rec. for Method of Medication Administration: meds whole, with thin liquids, as tolerated (24 1045)     Monitor for Signs of Aspiration: yes, notify SLP if any concerns (24 1045)  Recommended Diagnostics: reassess via clinical swallow evaluation (24  "1045)  Swallow Criteria for Skilled Therapeutic Interventions Met: demonstrates skilled criteria (04/20/24 1045)     Rehab Potential/Prognosis, Swallowing: good, to achieve stated therapy goals (04/20/24 1045)  Therapy Frequency (Swallow): PRN (04/20/24 1045)  Predicted Duration Therapy Intervention (Days): until discharge (04/20/24 1045)  Oral Care Recommendations: Oral Care BID/PRN, Oral Care before breakfast, after meals and PRN (04/20/24 1045)    SWALLOW EVALUATION (Last 72 Hours)       SLP Adult Swallow Evaluation       Row Name 04/20/24 1045       Rehab Evaluation    Document Type evaluation  -PF    Subjective Information no complaints  -PF    Patient Observations alert;cooperative;poorly cooperative  -PF    Patient Effort adequate  -PF    Symptoms Noted During/After Treatment none  -PF       General Information    Patient Profile Reviewed yes  -PF    Pertinent History Of Current Problem Karly Zayas is a 53 y.o. female, who presented to [facility] ED w/ complaints of increased confusion per pt's spouse.  PMHx of diabetes mellitus type 1, diabetic neuropathy, anxiety/depression, and presented to the hospital for hyperglycemia, and was admitted with a principal diagnosis of DKA, type 1.  Pt recently started a new insulin pump and apparently has been having issues with this device .  CXR w/ impression of no acute process identified.  MRI w/ the following impression: \"Punctate restricted diffusion within the left thalamus may be artifactual, although this is concerning for an acute infarct.\"  Received orders for swallow evaluation. -PF    Current Method of Nutrition NPO  -PF    Precautions/Limitations, Vision WFL  -PF    Prior Level of Function-Communication WFL  -PF    Prior Level of Function-Swallowing no diet consistency restrictions  -PF    Plans/Goals Discussed with patient;spouse/S.O.  -PF       Oral Motor Structure and Function    Dentition Assessment natural, present and adequate  -PF    Secretion Management " WNL/WFL  -PF    Mucosal Quality dry  -PF    Gag Response WFL  -PF       Oral Musculature and Cranial Nerve Assessment    Oral Motor General Assessment generalized oral motor weakness  -PF       General Eating/Swallowing Observations    Respiratory Support Currently in Use room air  -PF    Eating/Swallowing Skills self-fed  -PF    Positioning During Eating upright 90 degree;upright in bed  -PF    Utensils Used spoon;cup;straw  -PF    Consistencies Trialed regular textures;pureed;thin liquids  -PF       Clinical Swallow Eval    Clinical Swallow Evaluation Summary Clinical swallow evaluation completed.  Pt was alert and responsive. Spouse at bedside reported pt consumes a regular and thin liquid diet at home (diabetic diet).  Pt was brought up in bed and given trials of thin by cup, thin by straw, puree, regular solid, pt consumed one bite of peaches and spat it out of oral cavity d/t disliking the taste, and declined soft to chew trial.  Complete labial closure resulting in no anterior loss of bolus. No difficulty using the spoon or straw.  Mastication mildly slow but functional on solid trials assessed.  Oral transit unremarkable. There was no pocketing or oral residual. Digital palpation suggests timely swallow.  After the swallow, pt had clear vocal quality, no cough or other overt s/s of aspiration.  Per finding, pt presents w/ functional oral and pharyngeal phase of swallow.  It is recommended pt resume a regular and thin liquid diet.     SLP Plan & Recommendation:     - regular and thin liquid diet     -  safe swallow strategies: HOB at a 90 degree angle, slow rate of intake, small bites/sips, alternate liquid and solid    - ST will continue to complete meal assessment(s) to ensure tolerance and safety of rec'd diet, provide further recs as indicated. -PF       SLP Evaluation Clinical Impression    SLP Swallowing Diagnosis swallow WFL/no suspected pharyngeal impairment  -PF    Functional Impact no impact on  function  -PF    Rehab Potential/Prognosis, Swallowing good, to achieve stated therapy goals  -PF    Swallow Criteria for Skilled Therapeutic Interventions Met demonstrates skilled criteria  -PF       Recommendations    Therapy Frequency (Swallow) PRN  -PF    Predicted Duration Therapy Intervention (Days) until discharge  -PF    SLP Diet Recommendation regular textures;thin liquids  -PF    Recommended Diagnostics reassess via clinical swallow evaluation  -PF    Recommended Precautions and Strategies upright posture during/after eating;small bites of food and sips of liquid;multiple swallows per bite of food;multiple swallows per sip of liquid;alternate between small bites of food and sips of liquid  -PF    Oral Care Recommendations Oral Care BID/PRN;Oral Care before breakfast, after meals and PRN  -PF    SLP Rec. for Method of Medication Administration meds whole;with thin liquids;as tolerated  -PF    Monitor for Signs of Aspiration yes;notify SLP if any concerns  -PF       Swallow Goals (SLP)    Swallow LTGs Swallow Long Term Goal (free text)  -PF    Swallow STGs diet tolerance goal selection (SLP)  -PF    Diet Tolerance Goal Selection (SLP) Patient will tolerate trials of;Swallow Short Term Goal 1  -PF       (LTG) Swallow    (LTG) Swallow Pt will maximize swallow function for least restrictive PO diet, exhibiting no complication associated with dysphagia, adequate PO intake, and demonstrating independent use of swallow compensation.  -PF    [place] (Swallow Long Term Goal) with minimal cues (75-90% accuracy)  -PF    Time Frame (Swallow Long Term Goal) by discharge  -PF       (STG) Patient will tolerate trials of    Consistencies Trialed (Tolerate trials) regular textures;thin liquids  -PF    Desired Outcome (Tolerate trials) without signs/symptoms of aspiration  -PF    [place] (Tolerate trials) with minimal cues (75-90% accuracy)  -PF    Time Frame (Tolerate trials) by discharge  -PF       (STG) Swallow  1    (STG) Swallow 1 The patient will participate in a full meal assessment to determine safety and adequacy of recommended diet, independent use of safe swallow compensations, and additional goals/recommendations to follow.  -PF    [place] (Swallow Short Term Goal 1) with minimal cues (75-90% accuracy)  -PF    Time Frame (Swallow Short Term Goal 1) 1 week  -PF              User Key  (r) = Recorded By, (t) = Taken By, (c) = Cosigned By      Initials Name Effective Dates    PF Aliya Arciniega SLP 05/08/23 -                EDUCATION  The patient has been educated in the following areas:   Dysphagia (Swallowing Impairment) Oral Care/Hydration Modified Diet Instruction.        SLP GOALS       Row Name 04/20/24 1045       (LTG) Swallow    (LTG) Swallow Pt will maximize swallow function for least restrictive PO diet, exhibiting no complication associated with dysphagia, adequate PO intake, and demonstrating independent use of swallow compensation.  -PF    [place] (Swallow Long Term Goal) with minimal cues (75-90% accuracy)  -PF    Time Frame (Swallow Long Term Goal) by discharge  -PF       (STG) Patient will tolerate trials of    Consistencies Trialed (Tolerate trials) regular textures;thin liquids  -PF    Desired Outcome (Tolerate trials) without signs/symptoms of aspiration  -PF    [place] (Tolerate trials) with minimal cues (75-90% accuracy)  -PF    Time Frame (Tolerate trials) by discharge  -PF       (STG) Swallow 1    (STG) Swallow 1 The patient will participate in a full meal assessment to determine safety and adequacy of recommended diet, independent use of safe swallow compensations, and additional goals/recommendations to follow.  -PF    [place] (Swallow Short Term Goal 1) with minimal cues (75-90% accuracy)  -PF    Time Frame (Swallow Short Term Goal 1) 1 week  -PF              User Key  (r) = Recorded By, (t) = Taken By, (c) = Cosigned By      Initials Name Provider Type    PF Suze  Aliya, SLP Speech and Language Pathologist             GEE Doty  4/20/2024

## 2024-04-20 NOTE — PROGRESS NOTES
[facility]     Progress Note    Patient Name: Karly Zayas  : 1970  MRN: [id number]  Primary Care Physician:  Sandra Matias APRN  Date of admission: 2024  Service date and time: 24 12:43 EDT  Subjective   Subjective     Chief Complaint: elevated BS    HPI:  Patient Reports having palpitations,  at bedside states she is on many home meds that have not been restarted      Objective   Objective     Vitals:   Temp:  [98.3 °F (36.8 °C)-100.5 °F (38.1 °C)] 98.6 °F (37 °C)  Heart Rate:  [] 77  Resp:  [19-24] 20  BP: (147-187)/() 184/91  Physical Exam    Constitutional: Awake, alert   Eyes: PERRLA, sclerae anicteric, no conjunctival injection   HENT: NCAT, mucous membranes moist   Neck: Supple, no thyromegaly, no lymphadenopathy, trachea midline   Respiratory: Clear to auscultation bilaterally, nonlabored respirations    Cardiovascular: RRR, no murmurs, rubs, or gallops, palpable pedal pulses bilaterally   Gastrointestinal: Positive bowel sounds, soft, nontender, nondistended   Musculoskeletal: No bilateral ankle edema, no clubbing or cyanosis to extremities   Psychiatric: Appropriate affect, cooperative   Neurologic: Oriented x 3, strength symmetric in all extremities, Cranial Nerves grossly intact to confrontation, speech clear   Skin: No rashes     Result Review    Result Review:  I have personally reviewed the results from the time of this admission to 2024 12:43 EDT and agree with these findings:  [x]  Laboratory list / accordion  [x]  Microbiology  [x]  Radiology  [x]  EKG/Telemetry   [x]  Cardiology/Vascular   []  Pathology  []  Old records  []  Other:        Assessment & Plan   Assessment / Plan       Active Hospital Problems:  Active Hospital Problems    Diagnosis     **DKA, type 1     Acute CVA (cerebrovascular accident)     Hypertension, uncontrolled     Hyperlipidemia, mixed     Type 1 diabetes mellitus with diabetic neuropathy     Chronic back pain      Depression    Anxiety    Plan:    - off of insulin gtt now, endocrine consulted to manage DM, insulin pump malfunctioned, accuchecks, currently on glucommander  - IVF, trend labs, replace electrolytes  - Neuro consulted, imaging reviewed, PT/OT/ST  - cont home meds as able, she is on many medications per  who is at bedside    DVT prophylaxis:  Medical and mechanical DVT prophylaxis orders are present.        CODE STATUS:   Code Status (Patient has no pulse and is not breathing): CPR (Attempt to Resuscitate)  Medical Interventions (Patient has pulse or is breathing): Full Support    Disposition:  I expect patient to be discharged 2-3 days    Lorne Al MD

## 2024-04-20 NOTE — CONSULTS
Reason for Consultation: Acute CVA    Subjective .     History of present illness: 53-year-old female with a history of type 1 diabetes, anxiety, depression and diabetic neuropathy, presenting to the hospital for hyperglycemia admitted for DKA.  Neurology was consulted due to MRI obtained for altered mental status and identification of punctate region of diffusion restriction with ADC hypointensity correlate suggestive of very limited acute ischemia in the left thalamus.     Review of Systems  Patient follows commands but answers to questions are unreliable per patient's  at bedside so review of systems otherwise unreliable.    Hospital problem list, generated from chart    DKA, type 1    Chronic back pain    Depression    Hyperlipidemia, mixed    Type 1 diabetes mellitus with diabetic neuropathy    Acute CVA (cerebrovascular accident)    Hypertension, uncontrolled      History  Past Medical History:   Diagnosis Date    Anxiety     Autonomic neuropathy associated with type 1 diabetes mellitus     Bipolar 2 disorder     Cataracts, bilateral     Diabetes mellitus     TYPE 1    Diabetes mellitus type I     Diabetic retinopathy associated with type 1 diabetes mellitus     Disease of thyroid gland     DKA (diabetic ketoacidoses)     Foot fracture, right     High cholesterol     Kidney stone     Macular degeneration, bilateral     Neuropathy     Pelvic pain    ,   Past Surgical History:   Procedure Laterality Date    ANKLE SURGERY Right 2015     SECTION      COLONOSCOPY N/A 2023    Procedure: COLONOSCOPY;  Surgeon: Lori Cleary MD;  Location: [facility] ENDOSCOPY;  Service: Gastroenterology;  Laterality: N/A;    DIAGNOSTIC LAPAROSCOPY      DIAGNOSTIC LAPAROSCOPY N/A 2017    Procedure: LAPAROSCOPY, PARTIAL LEFT SALPINGECTOMY;  Surgeon: Arleen Quintana MD;  Location:  [facility];  Service:     DILATATION AND CURETTAGE N/A 7/10/2018    Procedure: FROZEN DILATATION AND  CURETTAGE POWER MORECELLATOR;  Surgeon: Nicole Shannon MD;  Location: [facility] OR;  Service: Gynecology    HYSTERECTOMY SUPRACERVICAL N/A 7/10/2018    Procedure: LAPAROSCOPIC SUPRACERVICAL HYSTERECTOMY WITH MORCELLATOR, LEFT SALPINGECTOMY;  Surgeon: Nicole Shannon MD;  Location: [facility] OR;  Service: Gynecology    HYSTEROSCOPY ENDOMETRIAL ABLATION      WITH TUBAL AND RIGHT OVARY REMOVAL    TONSILLECTOMY     ,   Family History   Problem Relation Age of Onset    Malig Hyperthermia Neg Hx    ,   Social History     Tobacco Use    Smoking status: Former     Current packs/day: 0.00     Types: Cigarettes     Start date: 1992     Quit date: 2017     Years since quittin.7     Passive exposure: Past    Smokeless tobacco: Former    Tobacco comments:     Currently using vaping   Vaping Use    Vaping status: Some Days    Substances: Nicotine, Flavoring    Devices: Pre-filled or refillable cartridge   Substance Use Topics    Alcohol use: No    Drug use: No   ,   Medications Prior to Admission   Medication Sig Dispense Refill Last Dose    albuterol sulfate  (90 Base) MCG/ACT inhaler Inhale 2 puffs Every 4 (Four) Hours As Needed.       citalopram (CeleXA) 20 MG tablet Take 1 tablet by mouth Daily.       clonazePAM (KlonoPIN) 1 MG tablet Take 1 tablet by mouth Daily As Needed for Anxiety.       gabapentin (NEURONTIN) 600 MG tablet Take 2 tablets by mouth 3 (Three) Times a Day.       HYDROcodone-acetaminophen (NORCO)  MG per tablet Take 1 tablet by mouth Every 6 (Six) Hours As Needed.       Insulin Glargine (BASAGLAR KWIKPEN) 100 UNIT/ML injection pen Inject 19 units once daily. Use in case of pump failure. 15 mL 12     Insulin Lispro (humaLOG) 100 UNIT/ML injection USE WITH INSULIN PUMP WITH A MAX DAILY DOSE  UNITS 30 mL 0     nortriptyline (PAMELOR) 10 MG capsule Take 1 capsule by mouth Every Night.       ondansetron ODT (ZOFRAN-ODT) 4 MG disintegrating tablet Place 1 tablet on the  tongue Every 8 (Eight) Hours As Needed.   4/18/2024    OXcarbazepine (TRILEPTAL) 300 MG tablet Take 1 tablet by mouth 3 times a day.       SUMAtriptan (IMITREX) 50 MG tablet Take 1 tablet by mouth 1 (One) Time As Needed.       tiZANidine (ZANAFLEX) 4 MG tablet Take 1 tablet by mouth Every 8 (Eight) Hours As Needed for Muscle Spasms.      , Scheduled Meds:  aspirin, 325 mg, Oral, Daily   Or  aspirin, 300 mg, Rectal, Daily  atorvastatin, 80 mg, Oral, Nightly  citalopram, 20 mg, Oral, Daily  famotidine, 20 mg, Intravenous, Daily  gabapentin, 600 mg, Oral, Q8H  heparin (porcine), 5,000 Units, Subcutaneous, Q8H  insulin glargine, 1-200 Units, Subcutaneous, Nightly - Glucommander  insulin lispro, 1-200 Units, Subcutaneous, With Meals, HS, AND Midsleep  lisinopril, 10 mg, Oral, Q24H  OXcarbazepine, 300 mg, Oral, TID  potassium phosphate, 15 mmol, Intravenous, Once  sodium chloride, 10 mL, Intravenous, Q12H  sodium chloride, 10 mL, Intravenous, Q12H    , Continuous Infusions:  dextrose 5 % and sodium chloride 0.45 %, 150 mL/hr  dextrose 5 % and sodium chloride 0.45 % with KCl 20 mEq/L, 150 mL/hr, Last Rate: Stopped (04/19/24 2335)  dextrose 5 % and sodium chloride 0.45 % with KCl 40 mEq/L, 150 mL/hr  dextrose 5 % and sodium chloride 0.9 %, 150 mL/hr  dextrose 5 % and sodium chloride 0.9 % with KCl 20 mEq, 150 mL/hr, Last Rate: Stopped (04/19/24 0135)  dextrose 5% and sodium chloride 0.9% with KCl 40 mEq/L, 150 mL/hr  sodium chloride 0.45 % 1,000 mL with potassium chloride 40 mEq infusion, 250 mL/hr  sodium chloride, 250 mL/hr  sodium chloride 0.45 % with KCl 20 mEq, 250 mL/hr, Last Rate: 250 mL/hr (04/19/24 1602)  sodium chloride, 250 mL/hr  sodium chloride, 100 mL/hr, Last Rate: Stopped (04/20/24 0900)  sodium chloride 0.9 % with KCl 20 mEq, 250 mL/hr, Last Rate: Stopped (04/18/24 1524)  sodium chloride 0.9 % with KCl 40 mEq/L, 250 mL/hr    , PRN Meds:    acetaminophen **OR** acetaminophen    aluminum-magnesium  hydroxide-simethicone    senna-docusate sodium **AND** polyethylene glycol **AND** bisacodyl **AND** bisacodyl    Calcium Replacement - Follow Nurse / BPA Driven Protocol    clonazePAM    dextrose    dextrose    dextrose    dextrose 5 % and sodium chloride 0.45 %    dextrose 5 % and sodium chloride 0.45 % with KCl 20 mEq/L    dextrose 5 % and sodium chloride 0.45 % with KCl 40 mEq/L    dextrose 5 % and sodium chloride 0.9 %    dextrose 5 % and sodium chloride 0.9 % with KCl 20 mEq    dextrose 5% and sodium chloride 0.9% with KCl 40 mEq/L    glucagon (human recombinant)    insulin lispro    labetalol    Magnesium Cardiology Dose Replacement - Follow Nurse / BPA Driven Protocol    nitroglycerin    ondansetron ODT **OR** ondansetron    Phosphorus Replacement - Follow Nurse / BPA Driven Protocol    Potassium Replacement - Follow Nurse / BPA Driven Protocol    sodium chloride 0.45 % 1,000 mL with potassium chloride 40 mEq infusion    sodium chloride    sodium chloride 0.45 % with KCl 20 mEq    sodium chloride    sodium chloride    sodium chloride    sodium chloride    sodium chloride    sodium chloride    sodium chloride 0.9 % with KCl 20 mEq    sodium chloride 0.9 % with KCl 40 mEq/L    tiZANidine and Allergies:  Prednisone and Nitrofurantoin monohyd macro    Objective     Vital Signs   Temp:  [98.3 °F (36.8 °C)-100.5 °F (38.1 °C)] 98.6 °F (37 °C)  Heart Rate:  [] 90  Resp:  [19-25] 25  BP: (147-187)/(72-97) 184/88    Intake & Output (last day)         04/19 0701  04/20 0700 04/20 0701  04/21 0700    P.O. 0     I.V. (mL/kg) 5367 (64.7)     Total Intake(mL/kg) 5367 (64.7)     Urine (mL/kg/hr) 3650 (1.8)     Emesis/NG output 0     Total Output 3650     Net +1717           Urine Unmeasured Occurrence 4 x     Emesis Unmeasured Occurrence 1 x              Physical Exam:     Mental status: patient awake and alert, seated in hospital bed, accurately reports her name and the name of her .  Inconsistent reporting of  tactile sensation side with eyes closed.  Cranial nerves: pupils equal, round, visual fields full to confrontation; EOMI; no ptosis or facial droop; facial sensation and strength intact throughout; hearing intact to voice; no nystagmus; palate elevates symmetrically; trapezius 5/5 bilaterally; tongue midline on protrusion and AROM intact. No dysarthria.   Motor: No drift. 5/5 distally and proximally in all limbs. Tone is normal.   Reflexes: 2/4 at bicep/triceps/brachioradialis/patella/Achilles bilaterally; No ankle clonus.   Sensation: Intact to light touch distally in all limbs.   Gait/Coordination: FTN, finger tapping normal bilaterally. No tremor.       Results Review:   I reviewed the patient's new clinical results.    Lab Results (last 24 hours)       Procedure Component Value Units Date/Time    Vitamin B12 [[id number]]  (Normal) Collected: 04/20/24 0421    Specimen: Blood Updated: 04/20/24 1340     Vitamin B-12 883 pg/mL     Narrative:      Results may be falsely increased if patient taking Biotin.      Phosphorus [[id number]]  (Abnormal) Collected: 04/20/24 1225    Specimen: Blood Updated: 04/20/24 1257     Phosphorus 1.9 mg/dL     Basic Metabolic Panel [[id number]]  (Abnormal) Collected: 04/20/24 1225    Specimen: Blood Updated: 04/20/24 1254     Glucose 289 mg/dL      BUN 8 mg/dL      Creatinine 0.58 mg/dL      Sodium 134 mmol/L      Potassium 3.7 mmol/L      Comment: Slight hemolysis detected by analyzer. Result may be falsely elevated.        Chloride 101 mmol/L      CO2 15.0 mmol/L      Calcium 8.5 mg/dL      BUN/Creatinine Ratio 13.8     Anion Gap 18.0 mmol/L      eGFR 108.4 mL/min/1.73     Narrative:      GFR Normal >60  Chronic Kidney Disease <60  Kidney Failure <15      Magnesium [[id number]]  (Normal) Collected: 04/20/24 1225    Specimen: Blood Updated: 04/20/24 1254     Magnesium 2.2 mg/dL     POC Glucose With Meals, HS, AND Midsleep [[id number]]  (Abnormal) Collected: 04/20/24 1223    Specimen: Blood  Updated: 04/20/24 1225     Glucose 274 mg/dL      Comment: Serial Number: [id number]:  [id number]       Blood Culture - Blood, Arm, Left [[id number]]  (Normal) Collected: 04/18/24 1107    Specimen: Blood from Arm, Left Updated: 04/20/24 1130     Blood Culture No growth at 2 days    Narrative:      Less than seven (7) mL's of blood was collected.  Insufficient quantity may yield false negative results.    Blood Culture - Blood, Arm, Right [[id number]]  (Normal) Collected: 04/18/24 1107    Specimen: Blood from Arm, Right Updated: 04/20/24 1130     Blood Culture No growth at 2 days    Narrative:      Less than seven (7) mL's of blood was collected.  Insufficient quantity may yield false negative results.    POC Glucose Once [[id number]]  (Abnormal) Collected: 04/20/24 0754    Specimen: Blood Updated: 04/20/24 1038     Glucose 266 mg/dL      Comment: Serial Number: [id number]:  [id number]       Extra Tubes [[id number]] Collected: 04/20/24 0819    Specimen: Blood, Venous Line Updated: 04/20/24 0930    Narrative:      The following orders were created for panel order Extra Tubes.  Procedure                               Abnormality         Status                     ---------                               -----------         ------                     Lavender Top[[id number]]                                     Final result                 Please view results for these tests on the individual orders.    Lavender Top [[id number]] Collected: 04/20/24 0819    Specimen: Blood Updated: 04/20/24 0930     Extra Tube hold for add-on     Comment: Auto resulted       Basic Metabolic Panel [[id number]]  (Abnormal) Collected: 04/20/24 0819    Specimen: Blood Updated: 04/20/24 0849     Glucose 272 mg/dL      BUN 8 mg/dL      Creatinine 0.61 mg/dL      Sodium 138 mmol/L      Potassium 3.9 mmol/L      Comment: Slight hemolysis detected by analyzer. Result may be falsely elevated.        Chloride 106 mmol/L      CO2 19.0 mmol/L       Calcium 8.4 mg/dL      BUN/Creatinine Ratio 13.1     Anion Gap 13.0 mmol/L      eGFR 107.1 mL/min/1.73     Narrative:      GFR Normal >60  Chronic Kidney Disease <60  Kidney Failure <15      Magnesium [[id number]]  (Normal) Collected: 04/20/24 0819    Specimen: Blood Updated: 04/20/24 0849     Magnesium 2.1 mg/dL     Phosphorus [[id number]]  (Abnormal) Collected: 04/20/24 0819    Specimen: Blood Updated: 04/20/24 0849     Phosphorus 2.0 mg/dL     Phosphorus [[id number]]  (Normal) Collected: 04/20/24 0421    Specimen: Blood Updated: 04/20/24 0515     Phosphorus 2.6 mg/dL     Basic Metabolic Panel [[id number]]  (Abnormal) Collected: 04/20/24 0421    Specimen: Blood Updated: 04/20/24 0513     Glucose 332 mg/dL      BUN 8 mg/dL      Creatinine 0.58 mg/dL      Sodium 138 mmol/L      Potassium 4.1 mmol/L      Comment: Slight hemolysis detected by analyzer. Result may be falsely elevated.        Chloride 103 mmol/L      CO2 14.0 mmol/L      Calcium 8.7 mg/dL      BUN/Creatinine Ratio 13.8     Anion Gap 21.0 mmol/L      eGFR 108.4 mL/min/1.73     Narrative:      GFR Normal >60  Chronic Kidney Disease <60  Kidney Failure <15      Magnesium [[id number]]  (Normal) Collected: 04/20/24 0421    Specimen: Blood Updated: 04/20/24 0513     Magnesium 2.3 mg/dL     Comprehensive Metabolic Panel [[id number]]  (Abnormal) Collected: 04/20/24 0421    Specimen: Blood Updated: 04/20/24 0513     Glucose 332 mg/dL      BUN 8 mg/dL      Creatinine 0.58 mg/dL      Sodium 138 mmol/L      Potassium 4.1 mmol/L      Comment: Slight hemolysis detected by analyzer. Result may be falsely elevated.        Chloride 103 mmol/L      CO2 14.0 mmol/L      Calcium 8.7 mg/dL      Total Protein 6.7 g/dL      Albumin 3.6 g/dL      ALT (SGPT) 9 U/L      AST (SGOT) 10 U/L      Alkaline Phosphatase 106 U/L      Total Bilirubin 0.4 mg/dL      Globulin 3.1 gm/dL      A/G Ratio 1.2 g/dL      BUN/Creatinine Ratio 13.8     Anion Gap 21.0 mmol/L      eGFR 108.4 mL/min/1.73      Narrative:      GFR Normal >60  Chronic Kidney Disease <60  Kidney Failure <15      Lipid Panel [[id number]]  (Abnormal) Collected: 04/20/24 0421    Specimen: Blood Updated: 04/20/24 0513     Total Cholesterol 189 mg/dL      Triglycerides 161 mg/dL      HDL Cholesterol 65 mg/dL      LDL Cholesterol  96 mg/dL      VLDL Cholesterol 28 mg/dL      LDL/HDL Ratio 1.41    Narrative:      Cholesterol Reference Ranges  (U.S. Department of Health and Human Services ATP III Classifications)    Desirable          <200 mg/dL  Borderline High    200-239 mg/dL  High Risk          >240 mg/dL      Triglyceride Reference Ranges  (U.S. Department of Health and Human Services ATP III Classifications)    Normal           <150 mg/dL  Borderline High  150-199 mg/dL  High             200-499 mg/dL  Very High        >500 mg/dL    HDL Reference Ranges  (U.S. Department of Health and Human Services ATP III Classifications)    Low     <40 mg/dl (major risk factor for CHD)  High    >60 mg/dl ('negative' risk factor for CHD)        LDL Reference Ranges  (U.S. Department of Health and Human Services ATP III Classifications)    Optimal          <100 mg/dL  Near Optimal     100-129 mg/dL  Borderline High  130-159 mg/dL  High             160-189 mg/dL  Very High        >189 mg/dL    CBC & Differential [[id number]]  (Abnormal) Collected: 04/20/24 0421    Specimen: Blood Updated: 04/20/24 0446    Narrative:      The following orders were created for panel order CBC & Differential.  Procedure                               Abnormality         Status                     ---------                               -----------         ------                     CBC Auto Differential[[id number]]        Abnormal            Final result                 Please view results for these tests on the individual orders.    CBC Auto Differential [[id number]]  (Abnormal) Collected: 04/20/24 0421    Specimen: Blood Updated: 04/20/24 0446     WBC 10.15 10*3/mm3      RBC 4.39  10*6/mm3      Hemoglobin 11.8 g/dL      Hematocrit 36.8 %      MCV 83.8 fL      MCH 26.9 pg      MCHC 32.1 g/dL      RDW 14.1 %      RDW-SD 43.3 fl      MPV 9.3 fL      Platelets 474 10*3/mm3      Neutrophil % 76.1 %      Lymphocyte % 16.4 %      Monocyte % 6.7 %      Eosinophil % 0.0 %      Basophil % 0.1 %      Immature Grans % 0.7 %      Neutrophils, Absolute 7.73 10*3/mm3      Lymphocytes, Absolute 1.66 10*3/mm3      Monocytes, Absolute 0.68 10*3/mm3      Eosinophils, Absolute 0.00 10*3/mm3      Basophils, Absolute 0.01 10*3/mm3      Immature Grans, Absolute 0.07 10*3/mm3      nRBC 0.0 /100 WBC     Phosphorus [[id number]]  (Abnormal) Collected: 04/20/24 0000    Specimen: Blood Updated: 04/20/24 0038     Phosphorus 1.9 mg/dL     Basic Metabolic Panel [[id number]]  (Abnormal) Collected: 04/20/24 0000    Specimen: Blood Updated: 04/20/24 0035     Glucose 120 mg/dL      BUN 7 mg/dL      Creatinine 0.52 mg/dL      Sodium 140 mmol/L      Potassium 3.6 mmol/L      Comment: Slight hemolysis detected by analyzer. Result may be falsely elevated.        Chloride 104 mmol/L      CO2 20.0 mmol/L      Calcium 9.1 mg/dL      BUN/Creatinine Ratio 13.5     Anion Gap 16.0 mmol/L      eGFR 111.3 mL/min/1.73     Narrative:      GFR Normal >60  Chronic Kidney Disease <60  Kidney Failure <15      Magnesium [[id number]]  (Normal) Collected: 04/20/24 0000    Specimen: Blood Updated: 04/20/24 0035     Magnesium 2.3 mg/dL     TSH [[id number]]  (Normal) Collected: 04/19/24 2029    Specimen: Blood Updated: 04/19/24 2358     TSH 0.982 uIU/mL     POC Glucose 4x Daily Before Meals & at Bedtime [[id number]]  (Abnormal) Collected: 04/19/24 2258    Specimen: Blood Updated: 04/19/24 2300     Glucose 135 mg/dL      Comment: Serial Number: [id number]:  [id number]       POC Glucose Once [[id number]]  (Abnormal) Collected: 04/19/24 2124    Specimen: Blood Updated: 04/19/24 2126     Glucose 182 mg/dL      Comment: Serial Number: [id number]:   [id number]       Phosphorus [[id number]]  (Abnormal) Collected: 04/19/24 2029    Specimen: Blood Updated: 04/19/24 2058     Phosphorus 1.5 mg/dL     Basic Metabolic Panel [[id number]]  (Abnormal) Collected: 04/19/24 2029    Specimen: Blood Updated: 04/19/24 2052     Glucose 225 mg/dL      BUN 9 mg/dL      Creatinine 0.60 mg/dL      Sodium 138 mmol/L      Potassium 3.7 mmol/L      Chloride 104 mmol/L      CO2 19.0 mmol/L      Calcium 9.3 mg/dL      BUN/Creatinine Ratio 15.0     Anion Gap 15.0 mmol/L      eGFR 107.5 mL/min/1.73     Narrative:      GFR Normal >60  Chronic Kidney Disease <60  Kidney Failure <15      Magnesium [[id number]]  (Normal) Collected: 04/19/24 2029    Specimen: Blood Updated: 04/19/24 2052     Magnesium 2.3 mg/dL     POC Glucose Once [[id number]]  (Abnormal) Collected: 04/19/24 2022    Specimen: Blood Updated: 04/19/24 2024     Glucose 222 mg/dL      Comment: Serial Number: [id number]:  [id number]       POC Glucose Once [[id number]]  (Abnormal) Collected: 04/19/24 1920    Specimen: Blood Updated: 04/19/24 1922     Glucose 237 mg/dL      Comment: Serial Number: [id number]:  [id number]       POC Glucose Once [[id number]]  (Abnormal) Collected: 04/19/24 1816    Specimen: Blood Updated: 04/19/24 1818     Glucose 248 mg/dL      Comment: Serial Number: [id number]:  [id number]       POC Glucose Once [[id number]]  (Abnormal) Collected: 04/19/24 1716    Specimen: Blood Updated: 04/19/24 1719     Glucose 289 mg/dL      Comment: Serial Number: [id number]:  [id number]       POC Glucose Once [[id number]]  (Abnormal) Collected: 04/19/24 1619    Specimen: Blood Updated: 04/19/24 1621     Glucose 231 mg/dL      Comment: Serial Number: [id number]:  [id number]       Phosphorus [[id number]]  (Abnormal) Collected: 04/19/24 1511    Specimen: Blood Updated: 04/19/24 1556     Phosphorus 0.9 mg/dL     Basic Metabolic Panel [[id number]]  (Abnormal) Collected: 04/19/24 1511    Specimen: Blood Updated:  04/19/24 1547     Glucose 268 mg/dL      BUN 11 mg/dL      Creatinine 0.70 mg/dL      Sodium 138 mmol/L      Potassium 3.5 mmol/L      Chloride 103 mmol/L      CO2 18.0 mmol/L      Calcium 9.6 mg/dL      BUN/Creatinine Ratio 15.7     Anion Gap 17.0 mmol/L      eGFR 103.6 mL/min/1.73     Narrative:      GFR Normal >60  Chronic Kidney Disease <60  Kidney Failure <15      Magnesium [[id number]]  (Normal) Collected: 04/19/24 1511    Specimen: Blood Updated: 04/19/24 1547     Magnesium 2.4 mg/dL     POC Glucose Once [[id number]]  (Abnormal) Collected: 04/19/24 1517    Specimen: Blood Updated: 04/19/24 1520     Glucose 280 mg/dL      Comment: Serial Number: [id number]:  [id number]              Imaging Results (Last 24 Hours)       Procedure Component Value Units Date/Time    CT Angiogram Head [[id number]] Collected: 04/19/24 2252     Updated: 04/19/24 2304    Narrative:      CT ANGIOGRAM HEAD, CT ANGIOGRAM CAROTIDS    Date of Exam: 4/19/2024 10:41 PM EDT    Indication: AMS.    Comparison: Same day MRI of the brain    Technique: CTA of the head was performed after the uneventful intravenous administration of iodinated contrast. Reconstructed coronal and sagittal images were also obtained. In addition, a 3-D volume rendered image was created for interpretation.   Automated exposure control and iterative reconstruction methods were used.      Findings:  Contrast opacification: Excellent    Aortic Arch: Unremarkable    Right:    Innominate: Patent  Subclavian: Patent  Common Carotid: Patent  External Carotid:Patent  Internal Carotid: Patent    Intracranial ICA: Vascular calcifications without definite hemodynamically significant stenosis.  MCA:Patent  DANIE: Patent  PCA: Patent  Vertebral: Patent    Left:    Subclavian: Patent  Common Carotid: Patent  External Carotid:Patent  Internal Carotid: Patent    Intracranial ICA: Patent  MCA:Patent  DANIE: Patent  PCA: Patent  Vertebral: Patent      Basilar: Patent      Soft Tissue: No  definite acute arterially enhancing intracranial lesion is identified. For additional intracranial findings, please see concurrently obtained MRI of the brain.  Although evaluation is limited, the dural venous sinuses are patent.  Mild thickening of the visualized upper esophagus.  Otherwise the visualized soft tissues are unremarkable.    Lungs: Unremarkable  Bones: No acute osseous abnormality.      Impression:      Impression:  No acute arterial abnormality of the head and neck. No evidence of vessel occlusion, hemodynamically significant stenosis, dissection or aneurysm.    Incidentally noted mild wall thickening of the visualized upper esophagus. This is a nonspecific finding but may represent esophagitis.        Electronically Signed: Rajinder Fisher DO    4/19/2024 11:02 PM EDT    Workstation ID: [id number]    CT Angiogram Carotids [[id number]] Collected: 04/19/24 2252     Updated: 04/19/24 2304    Narrative:      CT ANGIOGRAM HEAD, CT ANGIOGRAM CAROTIDS    Date of Exam: 4/19/2024 10:41 PM EDT    Indication: AMS.    Comparison: Same day MRI of the brain    Technique: CTA of the head was performed after the uneventful intravenous administration of iodinated contrast. Reconstructed coronal and sagittal images were also obtained. In addition, a 3-D volume rendered image was created for interpretation.   Automated exposure control and iterative reconstruction methods were used.      Findings:  Contrast opacification: Excellent    Aortic Arch: Unremarkable    Right:    Innominate: Patent  Subclavian: Patent  Common Carotid: Patent  External Carotid:Patent  Internal Carotid: Patent    Intracranial ICA: Vascular calcifications without definite hemodynamically significant stenosis.  MCA:Patent  DANIE: Patent  PCA: Patent  Vertebral: Patent    Left:    Subclavian: Patent  Common Carotid: Patent  External Carotid:Patent  Internal Carotid: Patent    Intracranial ICA: Patent  MCA:Patent  DANIE: Patent  PCA:  Patent  Vertebral: Patent      Basilar: Patent      Soft Tissue: No definite acute arterially enhancing intracranial lesion is identified. For additional intracranial findings, please see concurrently obtained MRI of the brain.  Although evaluation is limited, the dural venous sinuses are patent.  Mild thickening of the visualized upper esophagus.  Otherwise the visualized soft tissues are unremarkable.    Lungs: Unremarkable  Bones: No acute osseous abnormality.      Impression:      Impression:  No acute arterial abnormality of the head and neck. No evidence of vessel occlusion, hemodynamically significant stenosis, dissection or aneurysm.    Incidentally noted mild wall thickening of the visualized upper esophagus. This is a nonspecific finding but may represent esophagitis.        Electronically Signed: Rajinder Fisher DO    4/19/2024 11:02 PM EDT    Workstation ID: [id number]    MRI Brain Without Contrast [[id number]] Collected: 04/19/24 2232     Updated: 04/19/24 2252    Narrative:      MRI BRAIN WO CONTRAST    Date of Exam: 4/19/2024 10:10 PM EDT    Indication: AMS.     Comparison: CT head 4/18/2024    Technique:  Routine multiplanar/multisequence sequence images of the brain were obtained without contrast administration.      Findings:  Punctate restricted diffusion within the left thalamus may be artifactual, although this is concerning for an acute infarct (image 48 of series 5).  No other definite acute infarct or abnormal restricted diffusion is identified on this study.    No definite acute intracranial hemorrhage. Please note that evaluation is significantly limited due to lack of dedicated GRE images.  No mass effect, edema or midline shift    A few scattered punctate T2/FLAIR hyperintensities are noted within the periventricular and subcortical white matter, nonspecific but most likely represents chronic small vessel ischemic changes    No extra-axial fluid collection.    The midline structures are  unremarkable    Note is of obstructive hydrocephalus.      Patient is status post left lens extraction. Otherwise the orbits are unremarkable  The visualized paranasal sinuses and mastoid air cells are clear.    The visualized soft tissues are unremarkable.  No acute osseous abnormality.      Impression:      Impression:  Punctate restricted diffusion within the left thalamus may be artifactual, although this is concerning for an acute infarct. No other definite acute infarct or abnormal restricted diffusion is identified.    A few scattered punctate T2/FLAIR hyperintensities are noted within the periventricular and subcortical white matter, nonspecific but most likely represents chronic small vessel ischemic changes.        Electronically Signed: Rajinder Fisher DO    4/19/2024 10:47 PM EDT    Workstation ID: [id number]               Assessment & Plan     Acute ischemic stroke, punctate region in the left thalamus.    Agree this is an area of possible small very limited acute ischemic stroke, unlikely to be embolic in origin, however with patient's vascular risk factors would complete stroke workup to include TTE and vessel imaging by CTA, which actually have already been completed.  No large vessel occlusion, and no concerning findings on TTE.  Stroke labs reviewed, A1c over 9%, and LDL at 96.  Patient technically needs PT/OT/speech and language therapy evaluations for the identification of a new stroke.    And of course she will need secondary prevention, I recommend at least 81 mg of aspirin daily and Lipitor 80 mg daily.    Patient has an outpatient neurologist who manages her neuropathy associated with diabetes.  Recommend routine follow-up with that provider.    I believe her workup is complete, and at this point she just needs to be on secondary prevention as above.  Neurology will sign off, but do not hesitate to call us back.    I discussed the patient's findings and my recommendations with patient,  family, and nursing staff    Medical Decision Making for this neurology consultation consists of the following:  Review of previous chart, including H/P, provider and nursing notes as applicable.  Review of medications and vitals.  Review of previous labs, neuroimaging, and additional relevant diagnostics, as applicable.   Interpretation of laboratory, imaging, and other diagnostic results, as applicable.   Total face-to-face/floor time: 60 minutes.       This note contains portions which were generated via Dragon dictation software (voice to written text).    Teto Cha DO  04/20/24  14:26 EDT

## 2024-04-20 NOTE — PLAN OF CARE
Goal Outcome Evaluation:      Pt status and vital signs stable entire shift, despite persistent hypertension, provider aware. Good urine output this shift. Mentation has improved throughout the course of the shift. MRI and CT scans completed, CT negative. MRI showed acute infarct in left thalamus, provider notified and CVA orders in place. NIH of 9. No treatment at this time due to pt being out of timeframe for antithrombus therapy. After pt was made aware of this, mentation started improving. Pt now alert and oriented X4. Speech is clear, but words are not always present, pt still moans to express needs. Blood glucose had improved to 130s, DKA/ insulin gtt discontinued. Repeat lab work showed increased CO2, Anion Gap and glucose compared to previous labs. Sub Q insulin/ glucommander started. Neurology and endocrinology consults placed. Pt failed bedside swallow eval due to not being oriented at the time of examination. Pt is impatiently awaiting SLP for evaluation with repeated requests for water. Pt stroke depression scale high. Pt received 1 NS bolus. Pt stable.

## 2024-04-20 NOTE — SIGNIFICANT NOTE
#DM    - patient taken of insulin drip last night as DKA resolved    - this morning bicarb 14 with AG 21 and glucose 332    - change SC insulin to glucommander SC protocol    - 1 L NS bolus    - will have endocrinology establish care    - monitor labs

## 2024-04-20 NOTE — CONSULTS
[facility]    Referring Provider: Dr. Reba Castellanos  Reason for Consultation: Diabetes evaluation & management.    Patient Care Team:  Sandra Matias APRN as PCP - General (Family Medicine)    Chief complaint Hyperglycemia      Subjective .     History of present illness:    This is a  53 y.o. female with type 1 Diabetes since , using insulin pump since .  Currently using a T slim Control IQ since 2022:  Basal rates 12a 1.0, 3a 0.65, 9:30a 0.8, 4p 0.825; ICR 1:15; correction 1:50; target 150.  According to , she became lethargic with high sugar & vomiting. He gave sq lispro injection when noticed that pump tubing had a kink, which interrupted insulin delivery.   She got worse & was brought to hosp.  Admitted in DKA & placed on the insulin drip protocol.  Transitioned to sq glucommander, but AG opened again.  Seen by neurology & confirmed acute stroke as well.      Review of Systems  Review of Systems   Eyes:  Negative for blurred vision.   Gastrointestinal:  Positive for nausea and vomiting.   Endocrine: Negative for polyuria.   Neurological:  Positive for weakness and confusion. Negative for headache.       History  Past Medical History:   Diagnosis Date    Anxiety     Autonomic neuropathy associated with type 1 diabetes mellitus     Bipolar 2 disorder     Cataracts, bilateral     Diabetes mellitus     TYPE 1    Diabetes mellitus type I     Diabetic retinopathy associated with type 1 diabetes mellitus     Disease of thyroid gland     DKA (diabetic ketoacidoses)     Foot fracture, right     High cholesterol     Kidney stone     Macular degeneration, bilateral     Neuropathy     Pelvic pain      Past Surgical History:   Procedure Laterality Date    ANKLE SURGERY Right 2015     SECTION      COLONOSCOPY N/A 2023    Procedure: COLONOSCOPY;  Surgeon: Lori Cleary MD;  Location: [facility] ENDOSCOPY;  Service: Gastroenterology;  Laterality:  N/A;    DIAGNOSTIC LAPAROSCOPY      DIAGNOSTIC LAPAROSCOPY N/A 2017    Procedure: LAPAROSCOPY, PARTIAL LEFT SALPINGECTOMY;  Surgeon: Arleen Quintana MD;  Location: [facility];  Service:     DILATATION AND CURETTAGE N/A 7/10/2018    Procedure: FROZEN DILATATION AND CURETTAGE POWER MORECELLATOR;  Surgeon: Nicole Shannon MD;  Location: [facility] OR;  Service: Gynecology    HYSTERECTOMY SUPRACERVICAL N/A 7/10/2018    Procedure: LAPAROSCOPIC SUPRACERVICAL HYSTERECTOMY WITH MORCELLATOR, LEFT SALPINGECTOMY;  Surgeon: Nicole Shannon MD;  Location: [facility] OR;  Service: Gynecology    HYSTEROSCOPY ENDOMETRIAL ABLATION      WITH TUBAL AND RIGHT OVARY REMOVAL    TONSILLECTOMY       Family History   Problem Relation Age of Onset    Malig Hyperthermia Neg Hx      Social History     Tobacco Use    Smoking status: Former     Current packs/day: 0.00     Types: Cigarettes     Start date: 1992     Quit date: 2017     Years since quittin.7     Passive exposure: Past    Smokeless tobacco: Former    Tobacco comments:     Currently using vaping   Vaping Use    Vaping status: Some Days    Substances: Nicotine, Flavoring    Devices: Pre-filled or refillable cartridge   Substance Use Topics    Alcohol use: No    Drug use: No     Medications Prior to Admission   Medication Sig Dispense Refill Last Dose    albuterol sulfate  (90 Base) MCG/ACT inhaler Inhale 2 puffs Every 4 (Four) Hours As Needed.       citalopram (CeleXA) 20 MG tablet Take 1 tablet by mouth Daily.       clonazePAM (KlonoPIN) 1 MG tablet Take 1 tablet by mouth Daily As Needed for Anxiety.       gabapentin (NEURONTIN) 600 MG tablet Take 2 tablets by mouth 3 (Three) Times a Day.       HYDROcodone-acetaminophen (NORCO)  MG per tablet Take 1 tablet by mouth Every 6 (Six) Hours As Needed.       Insulin Glargine (BASAGLAR KWIKPEN) 100 UNIT/ML injection pen Inject 19 units once daily. Use in case of pump failure. 15 mL 12     Insulin  Lispro (humaLOG) 100 UNIT/ML injection USE WITH INSULIN PUMP WITH A MAX DAILY DOSE  UNITS 30 mL 0     nortriptyline (PAMELOR) 10 MG capsule Take 1 capsule by mouth Every Night.       ondansetron ODT (ZOFRAN-ODT) 4 MG disintegrating tablet Place 1 tablet on the tongue Every 8 (Eight) Hours As Needed.   4/18/2024    OXcarbazepine (TRILEPTAL) 300 MG tablet Take 1 tablet by mouth 3 times a day.       SUMAtriptan (IMITREX) 50 MG tablet Take 1 tablet by mouth 1 (One) Time As Needed.       tiZANidine (ZANAFLEX) 4 MG tablet Take 1 tablet by mouth Every 8 (Eight) Hours As Needed for Muscle Spasms.        Scheduled Meds:  aspirin, 325 mg, Oral, Daily   Or  aspirin, 300 mg, Rectal, Daily  atorvastatin, 80 mg, Oral, Nightly  citalopram, 20 mg, Oral, Daily  famotidine, 20 mg, Intravenous, Daily  gabapentin, 600 mg, Oral, Q8H  heparin (porcine), 5,000 Units, Subcutaneous, Q8H  insulin glargine, 20 Units, Subcutaneous, Daily  insulin lispro, 10 Units, Subcutaneous, TID With Meals  lisinopril, 10 mg, Oral, Q24H  OXcarbazepine, 300 mg, Oral, TID  sodium chloride, 10 mL, Intravenous, Q12H      Continuous Infusions:  sodium chloride, 100 mL/hr, Last Rate: Stopped (04/20/24 0900)      PRN Meds:    acetaminophen **OR** acetaminophen    aluminum-magnesium hydroxide-simethicone    senna-docusate sodium **AND** polyethylene glycol **AND** bisacodyl **AND** bisacodyl    Calcium Replacement - Follow Nurse / BPA Driven Protocol    clonazePAM    dextrose    dextrose    dextrose    glucagon (human recombinant)    labetalol    Magnesium Cardiology Dose Replacement - Follow Nurse / BPA Driven Protocol    nitroglycerin    ondansetron ODT **OR** ondansetron    Phosphorus Replacement - Follow Nurse / BPA Driven Protocol    sodium chloride    sodium chloride    sodium chloride    tiZANidine  Allergies:  Prednisone and Nitrofurantoin monohyd macro    Objective     Vital Signs   Temp:  [98.3 °F (36.8 °C)-99.1 °F (37.3 °C)] 98.6 °F (37 °C)  Heart  Rate:  [] 88  Resp:  [19-25] 25  BP: (147-187)/(71-97) 164/90    Physical Exam:     General Appearance:    Alert, cooperative, in moderate distress   Head:    Normocephalic, without obvious abnormality, atraumatic   Eyes:            Lids and lashes normal, conjunctivae and sclerae normal, no   icterus, no pallor, corneas clear, PERRLA   Throat:   No oral lesions,  oral mucosa moist   Neck:   No adenopathy, supple,  no thyromegaly, no carotid bruit   Lungs:     Clear     Heart:    Regular rhythm and normal rate   Chest Wall:    No abnormalities observed   Abdomen:     Normal bowel sounds, soft                 Extremities:   Moves all extremities well, no edema               Pulses:   Pulses palpable and equal bilaterally   Skin:   Dry   Neurologic:  DTR absent, able to feel the 10g monofilament       Results Review  I have reviewed the patient's new clinical results, labs & imaging.    Lab Results (last 24 hours)       Procedure Component Value Units Date/Time    POC Glucose Once [[id number]]  (Abnormal) Collected: 04/20/24 1650    Specimen: Blood Updated: 04/20/24 1651     Glucose 313 mg/dL      Comment: Serial Number: [id number]:  [id number]       Acetone, Urine, Qualitative - Urine, Clean Catch [[id number]]  (Abnormal) Collected: 04/20/24 1532    Specimen: Urine, Clean Catch Updated: 04/20/24 1543     Ketones, UA 40 mg/dL (2+)    Vitamin B12 [[id number]]  (Normal) Collected: 04/20/24 0421    Specimen: Blood Updated: 04/20/24 1340     Vitamin B-12 883 pg/mL     Narrative:      Results may be falsely increased if patient taking Biotin.      Phosphorus [[id number]]  (Abnormal) Collected: 04/20/24 1225    Specimen: Blood Updated: 04/20/24 1257     Phosphorus 1.9 mg/dL     Basic Metabolic Panel [[id number]]  (Abnormal) Collected: 04/20/24 1225    Specimen: Blood Updated: 04/20/24 1254     Glucose 289 mg/dL      BUN 8 mg/dL      Creatinine 0.58 mg/dL      Sodium 134 mmol/L      Potassium 3.7 mmol/L      Comment:  Slight hemolysis detected by analyzer. Result may be falsely elevated.        Chloride 101 mmol/L      CO2 15.0 mmol/L      Calcium 8.5 mg/dL      BUN/Creatinine Ratio 13.8     Anion Gap 18.0 mmol/L      eGFR 108.4 mL/min/1.73     Narrative:      GFR Normal >60  Chronic Kidney Disease <60  Kidney Failure <15      Magnesium [[id number]]  (Normal) Collected: 04/20/24 1225    Specimen: Blood Updated: 04/20/24 1254     Magnesium 2.2 mg/dL     POC Glucose With Meals, HS, AND Midsleep [[id number]]  (Abnormal) Collected: 04/20/24 1223    Specimen: Blood Updated: 04/20/24 1225     Glucose 274 mg/dL      Comment: Serial Number: [id number]:  [id number]       Blood Culture - Blood, Arm, Left [[id number]]  (Normal) Collected: 04/18/24 1107    Specimen: Blood from Arm, Left Updated: 04/20/24 1130     Blood Culture No growth at 2 days    Narrative:      Less than seven (7) mL's of blood was collected.  Insufficient quantity may yield false negative results.    Blood Culture - Blood, Arm, Right [[id number]]  (Normal) Collected: 04/18/24 1107    Specimen: Blood from Arm, Right Updated: 04/20/24 1130     Blood Culture No growth at 2 days    Narrative:      Less than seven (7) mL's of blood was collected.  Insufficient quantity may yield false negative results.    POC Glucose Once [[id number]]  (Abnormal) Collected: 04/20/24 0754    Specimen: Blood Updated: 04/20/24 1038     Glucose 266 mg/dL      Comment: Serial Number: [id number]:  [id number]       Extra Tubes [[id number]] Collected: 04/20/24 0819    Specimen: Blood, Venous Line Updated: 04/20/24 0930    Narrative:      The following orders were created for panel order Extra Tubes.  Procedure                               Abnormality         Status                     ---------                               -----------         ------                     Lavender Top[[id number]]                                     Final result                 Please view results for these tests  on the individual orders.    Lavender Top [[id number]] Collected: 04/20/24 0819    Specimen: Blood Updated: 04/20/24 0930     Extra Tube hold for add-on     Comment: Auto resulted       Basic Metabolic Panel [[id number]]  (Abnormal) Collected: 04/20/24 0819    Specimen: Blood Updated: 04/20/24 0849     Glucose 272 mg/dL      BUN 8 mg/dL      Creatinine 0.61 mg/dL      Sodium 138 mmol/L      Potassium 3.9 mmol/L      Comment: Slight hemolysis detected by analyzer. Result may be falsely elevated.        Chloride 106 mmol/L      CO2 19.0 mmol/L      Calcium 8.4 mg/dL      BUN/Creatinine Ratio 13.1     Anion Gap 13.0 mmol/L      eGFR 107.1 mL/min/1.73     Narrative:      GFR Normal >60  Chronic Kidney Disease <60  Kidney Failure <15      Magnesium [[id number]]  (Normal) Collected: 04/20/24 0819    Specimen: Blood Updated: 04/20/24 0849     Magnesium 2.1 mg/dL     Phosphorus [[id number]]  (Abnormal) Collected: 04/20/24 0819    Specimen: Blood Updated: 04/20/24 0849     Phosphorus 2.0 mg/dL     Phosphorus [[id number]]  (Normal) Collected: 04/20/24 0421    Specimen: Blood Updated: 04/20/24 0515     Phosphorus 2.6 mg/dL     Basic Metabolic Panel [[id number]]  (Abnormal) Collected: 04/20/24 0421    Specimen: Blood Updated: 04/20/24 0513     Glucose 332 mg/dL      BUN 8 mg/dL      Creatinine 0.58 mg/dL      Sodium 138 mmol/L      Potassium 4.1 mmol/L      Comment: Slight hemolysis detected by analyzer. Result may be falsely elevated.        Chloride 103 mmol/L      CO2 14.0 mmol/L      Calcium 8.7 mg/dL      BUN/Creatinine Ratio 13.8     Anion Gap 21.0 mmol/L      eGFR 108.4 mL/min/1.73     Narrative:      GFR Normal >60  Chronic Kidney Disease <60  Kidney Failure <15      Magnesium [[id number]]  (Normal) Collected: 04/20/24 0421    Specimen: Blood Updated: 04/20/24 0513     Magnesium 2.3 mg/dL     Comprehensive Metabolic Panel [[id number]]  (Abnormal) Collected: 04/20/24 0421    Specimen: Blood Updated: 04/20/24 0513     Glucose 332  mg/dL      BUN 8 mg/dL      Creatinine 0.58 mg/dL      Sodium 138 mmol/L      Potassium 4.1 mmol/L      Comment: Slight hemolysis detected by analyzer. Result may be falsely elevated.        Chloride 103 mmol/L      CO2 14.0 mmol/L      Calcium 8.7 mg/dL      Total Protein 6.7 g/dL      Albumin 3.6 g/dL      ALT (SGPT) 9 U/L      AST (SGOT) 10 U/L      Alkaline Phosphatase 106 U/L      Total Bilirubin 0.4 mg/dL      Globulin 3.1 gm/dL      A/G Ratio 1.2 g/dL      BUN/Creatinine Ratio 13.8     Anion Gap 21.0 mmol/L      eGFR 108.4 mL/min/1.73     Narrative:      GFR Normal >60  Chronic Kidney Disease <60  Kidney Failure <15      Lipid Panel [[id number]]  (Abnormal) Collected: 04/20/24 0421    Specimen: Blood Updated: 04/20/24 0513     Total Cholesterol 189 mg/dL      Triglycerides 161 mg/dL      HDL Cholesterol 65 mg/dL      LDL Cholesterol  96 mg/dL      VLDL Cholesterol 28 mg/dL      LDL/HDL Ratio 1.41    Narrative:      Cholesterol Reference Ranges  (U.S. Department of Health and Human Services ATP III Classifications)    Desirable          <200 mg/dL  Borderline High    200-239 mg/dL  High Risk          >240 mg/dL      Triglyceride Reference Ranges  (U.S. Department of Health and Human Services ATP III Classifications)    Normal           <150 mg/dL  Borderline High  150-199 mg/dL  High             200-499 mg/dL  Very High        >500 mg/dL    HDL Reference Ranges  (U.S. Department of Health and Human Services ATP III Classifications)    Low     <40 mg/dl (major risk factor for CHD)  High    >60 mg/dl ('negative' risk factor for CHD)        LDL Reference Ranges  (U.S. Department of Health and Human Services ATP III Classifications)    Optimal          <100 mg/dL  Near Optimal     100-129 mg/dL  Borderline High  130-159 mg/dL  High             160-189 mg/dL  Very High        >189 mg/dL    CBC & Differential [[id number]]  (Abnormal) Collected: 04/20/24 0421    Specimen: Blood Updated: 04/20/24 0446    Narrative:       The following orders were created for panel order CBC & Differential.  Procedure                               Abnormality         Status                     ---------                               -----------         ------                     CBC Auto Differential[[id number]]        Abnormal            Final result                 Please view results for these tests on the individual orders.    CBC Auto Differential [[id number]]  (Abnormal) Collected: 04/20/24 0421    Specimen: Blood Updated: 04/20/24 0446     WBC 10.15 10*3/mm3      RBC 4.39 10*6/mm3      Hemoglobin 11.8 g/dL      Hematocrit 36.8 %      MCV 83.8 fL      MCH 26.9 pg      MCHC 32.1 g/dL      RDW 14.1 %      RDW-SD 43.3 fl      MPV 9.3 fL      Platelets 474 10*3/mm3      Neutrophil % 76.1 %      Lymphocyte % 16.4 %      Monocyte % 6.7 %      Eosinophil % 0.0 %      Basophil % 0.1 %      Immature Grans % 0.7 %      Neutrophils, Absolute 7.73 10*3/mm3      Lymphocytes, Absolute 1.66 10*3/mm3      Monocytes, Absolute 0.68 10*3/mm3      Eosinophils, Absolute 0.00 10*3/mm3      Basophils, Absolute 0.01 10*3/mm3      Immature Grans, Absolute 0.07 10*3/mm3      nRBC 0.0 /100 WBC     Phosphorus [[id number]]  (Abnormal) Collected: 04/20/24 0000    Specimen: Blood Updated: 04/20/24 0038     Phosphorus 1.9 mg/dL     Basic Metabolic Panel [[id number]]  (Abnormal) Collected: 04/20/24 0000    Specimen: Blood Updated: 04/20/24 0035     Glucose 120 mg/dL      BUN 7 mg/dL      Creatinine 0.52 mg/dL      Sodium 140 mmol/L      Potassium 3.6 mmol/L      Comment: Slight hemolysis detected by analyzer. Result may be falsely elevated.        Chloride 104 mmol/L      CO2 20.0 mmol/L      Calcium 9.1 mg/dL      BUN/Creatinine Ratio 13.5     Anion Gap 16.0 mmol/L      eGFR 111.3 mL/min/1.73     Narrative:      GFR Normal >60  Chronic Kidney Disease <60  Kidney Failure <15      Magnesium [[id number]]  (Normal) Collected: 04/20/24 0000    Specimen: Blood Updated: 04/20/24 0035      Magnesium 2.3 mg/dL     TSH [[id number]]  (Normal) Collected: 04/19/24 2029    Specimen: Blood Updated: 04/19/24 2358     TSH 0.982 uIU/mL     POC Glucose 4x Daily Before Meals & at Bedtime [[id number]]  (Abnormal) Collected: 04/19/24 2258    Specimen: Blood Updated: 04/19/24 2300     Glucose 135 mg/dL      Comment: Serial Number: [id number]:  [id number]       POC Glucose Once [[id number]]  (Abnormal) Collected: 04/19/24 2124    Specimen: Blood Updated: 04/19/24 2126     Glucose 182 mg/dL      Comment: Serial Number: [id number]:  [id number]       Phosphorus [[id number]]  (Abnormal) Collected: 04/19/24 2029    Specimen: Blood Updated: 04/19/24 2058     Phosphorus 1.5 mg/dL     Basic Metabolic Panel [[id number]]  (Abnormal) Collected: 04/19/24 2029    Specimen: Blood Updated: 04/19/24 2052     Glucose 225 mg/dL      BUN 9 mg/dL      Creatinine 0.60 mg/dL      Sodium 138 mmol/L      Potassium 3.7 mmol/L      Chloride 104 mmol/L      CO2 19.0 mmol/L      Calcium 9.3 mg/dL      BUN/Creatinine Ratio 15.0     Anion Gap 15.0 mmol/L      eGFR 107.5 mL/min/1.73     Narrative:      GFR Normal >60  Chronic Kidney Disease <60  Kidney Failure <15      Magnesium [[id number]]  (Normal) Collected: 04/19/24 2029    Specimen: Blood Updated: 04/19/24 2052     Magnesium 2.3 mg/dL     POC Glucose Once [[id number]]  (Abnormal) Collected: 04/19/24 2022    Specimen: Blood Updated: 04/19/24 2024     Glucose 222 mg/dL      Comment: Serial Number: [id number]:  [id number]       POC Glucose Once [[id number]]  (Abnormal) Collected: 04/19/24 1920    Specimen: Blood Updated: 04/19/24 1922     Glucose 237 mg/dL      Comment: Serial Number: [id number]:  [id number]       POC Glucose Once [[id number]]  (Abnormal) Collected: 04/19/24 1816    Specimen: Blood Updated: 04/19/24 1818     Glucose 248 mg/dL      Comment: Serial Number: [id number]:  [id number]       POC Glucose Once [[id number]]  (Abnormal) Collected: 04/19/24 1716     Specimen: Blood Updated: 04/19/24 1719     Glucose 289 mg/dL      Comment: Serial Number: [id number]:  [id number]             Lab Results   Component Value Date    HGBA1C 9.10 (H) 04/18/2024     Lab Results   Component Value Date    TSH 0.982 04/19/2024     Urine ketones 40 mg ( probably explains nausea )    Assessment & Plan     DKA  Acute ischemic stroke    Will discontinue glucommander.  Give one time Lantus & lispro now & start basal/bolus regimen.  Will wait to restart the pump once she is feeling up to it.  Will follow with you.  Thank you for the consult.    I discussed the patients findings and my recommendations with patient, , bedtime nursing & pharm.     Nai Patton MD  04/20/24  16:57 EDT

## 2024-04-20 NOTE — CASE MANAGEMENT/SOCIAL WORK
Continued Stay Note   Ismael     Patient Name: Karly Zayas  MRN: [id number]  Today's Date: 4/20/2024    Admit Date: 4/18/2024    Plan: Plan to return home with spouse.   Discharge Plan       Row Name 04/20/24 0700       Plan    Plan Comments DC barrier: neuro, endo following; pending pt/ot eval.  Sitter at bedside.                      Expected Discharge Date and Time       Expected Discharge Date Expected Discharge Time    Apr 20, 2024               Katty Aleman RN

## 2024-04-20 NOTE — PLAN OF CARE
Goal Outcome Evaluation:  Plan of Care Reviewed With: patient, spouse        Progress: no change          Patient remains mildly confused but able to answer orientation questions.  PRN meds given throughout the day for nausea and heartburn.  Insulin dosing adjusted and Glucommander d/c'd.  Evaluated by ST and diet ordered.  Patient transferring to [facility].

## 2024-04-20 NOTE — SIGNIFICANT NOTE
MRI concerning for CVA  CTA head/neck reviewed  Outside TNK window  Stroke order set placed with neurology consult    Electronically signed by Reba Castellanos DO, 04/19/24, 11:19 PM EDT.

## 2024-04-20 NOTE — PLAN OF CARE
Goal Outcome Evaluation:    Clinical swallow evaluation completed.  Pt was alert and responsive. Spouse at bedside reported pt consumes a regular and thin liquid diet at home (diabetic diet).  Pt was brought up in bed and given trials of thin by cup, thin by straw, puree, regular solid, pt consumed one bite of peaches and spat it out of oral cavity d/t disliking the taste, and declined soft to chew trial.  Complete labial closure resulting in no anterior loss of bolus. No difficulty using the spoon or straw.  Mastication mildly slow but functional on solid trials assessed.  Oral transit unremarkable. There was no pocketing or oral residual. Digital palpation suggests timely swallow.  After the swallow, pt had clear vocal quality, no cough or other overt s/s of aspiration.  Per finding, pt presents w/ functional oral and pharyngeal phase of swallow.  It is recommended pt resume a regular and thin liquid diet.     SLP Plan & Recommendation:     - regular and thin liquid diet     -  safe swallow strategies: HOB at a 90 degree angle, slow rate of intake, small bites/sips, alternate liquid and solid    - ST will continue to complete meal assessment(s) to ensure tolerance and safety of rec'd diet, provide further recs as indicated. -PF                             SLP Swallowing Diagnosis: swallow WFL/no suspected pharyngeal impairment (04/20/24 4203)

## 2024-04-20 NOTE — PLAN OF CARE
Problem: Skin Injury Risk Increased  Goal: Skin Health and Integrity  Outcome: Ongoing, Progressing     Problem: Adult Inpatient Plan of Care  Goal: Plan of Care Review  Outcome: Ongoing, Progressing  Goal: Patient-Specific Goal (Individualized)  Outcome: Ongoing, Progressing  Goal: Absence of Hospital-Acquired Illness or Injury  Outcome: Ongoing, Progressing  Intervention: Identify and Manage Fall Risk  Recent Flowsheet Documentation  Taken 4/20/2024 1816 by Kathy Veliz LPN  Safety Promotion/Fall Prevention: safety round/check completed  Intervention: Prevent and Manage VTE (Venous Thromboembolism) Risk  Recent Flowsheet Documentation  Taken 4/20/2024 1816 by Kathy Veliz LPN  VTE Prevention/Management:   bilateral   sequential compression devices off   patient refused intervention  Intervention: Prevent Infection  Recent Flowsheet Documentation  Taken 4/20/2024 1816 by Kathy Veliz LPN  Infection Prevention:   hand hygiene promoted   rest/sleep promoted   single patient room provided  Goal: Optimal Comfort and Wellbeing  Outcome: Ongoing, Progressing  Goal: Readiness for Transition of Care  Outcome: Ongoing, Progressing     Problem: Fall Injury Risk  Goal: Absence of Fall and Fall-Related Injury  Outcome: Ongoing, Progressing  Intervention: Identify and Manage Contributors  Recent Flowsheet Documentation  Taken 4/20/2024 1816 by Kathy Veliz LPN  Medication Review/Management: medications reviewed  Intervention: Promote Injury-Free Environment  Recent Flowsheet Documentation  Taken 4/20/2024 1816 by Kathy Veliz LPN  Safety Promotion/Fall Prevention: safety round/check completed     Problem: Diabetes Comorbidity  Goal: Blood Glucose Level Within Targeted Range  Outcome: Ongoing, Progressing     Problem: Adjustment to Illness (Stroke, Ischemic/Transient Ischemic Attack)  Goal: Optimal Coping  Outcome: Ongoing, Progressing     Problem: Bowel Elimination Impaired (Stroke, Ischemic/Transient Ischemic  Attack)  Goal: Effective Bowel Elimination  Outcome: Ongoing, Progressing     Problem: Cerebral Tissue Perfusion (Stroke, Ischemic/Transient Ischemic Attack)  Goal: Optimal Cerebral Tissue Perfusion  Outcome: Ongoing, Progressing     Problem: Cognitive Impairment (Stroke, Ischemic/Transient Ischemic Attack)  Goal: Optimal Cognitive Function  Outcome: Ongoing, Progressing     Problem: Communication Impairment (Stroke, Ischemic/Transient Ischemic Attack)  Goal: Improved Communication Skills  Outcome: Ongoing, Progressing     Problem: Functional Ability Impaired (Stroke, Ischemic/Transient Ischemic Attack)  Goal: Optimal Functional Ability  Outcome: Ongoing, Progressing     Problem: Respiratory Compromise (Stroke, Ischemic/Transient Ischemic Attack)  Goal: Effective Oxygenation and Ventilation  Outcome: Ongoing, Progressing     Problem: Sensorimotor Impairment (Stroke, Ischemic/Transient Ischemic Attack)  Goal: Improved Sensorimotor Function  Outcome: Ongoing, Progressing     Problem: Swallowing Impairment (Stroke, Ischemic/Transient Ischemic Attack)  Goal: Optimal Eating and Swallowing without Aspiration  Outcome: Ongoing, Progressing     Problem: Urinary Elimination Impaired (Stroke, Ischemic/Transient Ischemic Attack)  Goal: Effective Urinary Elimination  Outcome: Ongoing, Progressing   Goal Outcome Evaluation:

## 2024-04-21 LAB
ALBUMIN SERPL-MCNC: 3.6 G/DL (ref 3.5–5.2)
ALBUMIN/GLOB SERPL: 1.1 G/DL
ALP SERPL-CCNC: 93 U/L (ref 39–117)
ALT SERPL W P-5'-P-CCNC: 9 U/L (ref 1–33)
ANION GAP SERPL CALCULATED.3IONS-SCNC: 14 MMOL/L (ref 5–15)
AST SERPL-CCNC: 12 U/L (ref 1–32)
BASOPHILS # BLD AUTO: 0.02 10*3/MM3 (ref 0–0.2)
BASOPHILS NFR BLD AUTO: 0.2 % (ref 0–1.5)
BILIRUB SERPL-MCNC: 0.4 MG/DL (ref 0–1.2)
BILIRUB UR QL STRIP: NEGATIVE
BUN SERPL-MCNC: 8 MG/DL (ref 6–20)
BUN/CREAT SERPL: 13.3 (ref 7–25)
CALCIUM SPEC-SCNC: 9.1 MG/DL (ref 8.6–10.5)
CHLORIDE SERPL-SCNC: 103 MMOL/L (ref 98–107)
CLARITY UR: CLEAR
CO2 SERPL-SCNC: 22 MMOL/L (ref 22–29)
COLOR UR: ABNORMAL
CREAT SERPL-MCNC: 0.6 MG/DL (ref 0.57–1)
DEPRECATED RDW RBC AUTO: 43.2 FL (ref 37–54)
EGFRCR SERPLBLD CKD-EPI 2021: 107.5 ML/MIN/1.73
EOSINOPHIL # BLD AUTO: 0 10*3/MM3 (ref 0–0.4)
EOSINOPHIL NFR BLD AUTO: 0 % (ref 0.3–6.2)
ERYTHROCYTE [DISTWIDTH] IN BLOOD BY AUTOMATED COUNT: 14.1 % (ref 12.3–15.4)
GLOBULIN UR ELPH-MCNC: 3.3 GM/DL
GLUCOSE BLDC GLUCOMTR-MCNC: 102 MG/DL (ref 70–105)
GLUCOSE BLDC GLUCOMTR-MCNC: 106 MG/DL (ref 70–105)
GLUCOSE BLDC GLUCOMTR-MCNC: 106 MG/DL (ref 70–105)
GLUCOSE BLDC GLUCOMTR-MCNC: 136 MG/DL (ref 70–105)
GLUCOSE BLDC GLUCOMTR-MCNC: 153 MG/DL (ref 70–105)
GLUCOSE BLDC GLUCOMTR-MCNC: 205 MG/DL (ref 70–105)
GLUCOSE BLDC GLUCOMTR-MCNC: 255 MG/DL (ref 70–105)
GLUCOSE BLDC GLUCOMTR-MCNC: 270 MG/DL (ref 70–105)
GLUCOSE BLDC GLUCOMTR-MCNC: 42 MG/DL (ref 70–105)
GLUCOSE BLDC GLUCOMTR-MCNC: 56 MG/DL (ref 70–105)
GLUCOSE BLDC GLUCOMTR-MCNC: 73 MG/DL (ref 70–105)
GLUCOSE BLDC GLUCOMTR-MCNC: 86 MG/DL (ref 70–105)
GLUCOSE BLDC GLUCOMTR-MCNC: 90 MG/DL (ref 70–105)
GLUCOSE SERPL-MCNC: 221 MG/DL (ref 65–99)
GLUCOSE UR STRIP-MCNC: ABNORMAL MG/DL
HCT VFR BLD AUTO: 36.9 % (ref 34–46.6)
HGB BLD-MCNC: 11.9 G/DL (ref 12–15.9)
HGB UR QL STRIP.AUTO: NEGATIVE
IMM GRANULOCYTES # BLD AUTO: 0.02 10*3/MM3 (ref 0–0.05)
IMM GRANULOCYTES NFR BLD AUTO: 0.2 % (ref 0–0.5)
KETONES UR QL STRIP: ABNORMAL
LEUKOCYTE ESTERASE UR QL STRIP.AUTO: NEGATIVE
LYMPHOCYTES # BLD AUTO: 3.11 10*3/MM3 (ref 0.7–3.1)
LYMPHOCYTES NFR BLD AUTO: 38 % (ref 19.6–45.3)
MAGNESIUM SERPL-MCNC: 2.2 MG/DL (ref 1.6–2.6)
MCH RBC QN AUTO: 27 PG (ref 26.6–33)
MCHC RBC AUTO-ENTMCNC: 32.2 G/DL (ref 31.5–35.7)
MCV RBC AUTO: 83.7 FL (ref 79–97)
MONOCYTES # BLD AUTO: 0.58 10*3/MM3 (ref 0.1–0.9)
MONOCYTES NFR BLD AUTO: 7.1 % (ref 5–12)
NEUTROPHILS NFR BLD AUTO: 4.45 10*3/MM3 (ref 1.7–7)
NEUTROPHILS NFR BLD AUTO: 54.5 % (ref 42.7–76)
NITRITE UR QL STRIP: NEGATIVE
NRBC BLD AUTO-RTO: 0 /100 WBC (ref 0–0.2)
PH UR STRIP.AUTO: 5.5 [PH] (ref 5–8)
PHOSPHATE SERPL-MCNC: 1.9 MG/DL (ref 2.5–4.5)
PHOSPHATE SERPL-MCNC: 1.9 MG/DL (ref 2.5–4.5)
PLATELET # BLD AUTO: 450 10*3/MM3 (ref 140–450)
PMV BLD AUTO: 9.5 FL (ref 6–12)
POTASSIUM SERPL-SCNC: 3.5 MMOL/L (ref 3.5–5.2)
PROT SERPL-MCNC: 6.9 G/DL (ref 6–8.5)
PROT UR QL STRIP: ABNORMAL
RBC # BLD AUTO: 4.41 10*6/MM3 (ref 3.77–5.28)
SODIUM SERPL-SCNC: 139 MMOL/L (ref 136–145)
SP GR UR STRIP: 1.02 (ref 1–1.03)
UROBILINOGEN UR QL STRIP: ABNORMAL
WBC NRBC COR # BLD AUTO: 8.18 10*3/MM3 (ref 3.4–10.8)

## 2024-04-21 PROCEDURE — 85025 COMPLETE CBC W/AUTO DIFF WBC: CPT | Performed by: NURSE PRACTITIONER

## 2024-04-21 PROCEDURE — 82948 REAGENT STRIP/BLOOD GLUCOSE: CPT

## 2024-04-21 PROCEDURE — 99231 SBSQ HOSP IP/OBS SF/LOW 25: CPT | Performed by: INTERNAL MEDICINE

## 2024-04-21 PROCEDURE — 63710000001 INSULIN LISPRO (HUMAN) PER 5 UNITS: Performed by: INTERNAL MEDICINE

## 2024-04-21 PROCEDURE — 25010000002 HEPARIN (PORCINE) PER 1000 UNITS: Performed by: NURSE PRACTITIONER

## 2024-04-21 PROCEDURE — 25010000002 ONDANSETRON PER 1 MG: Performed by: NURSE PRACTITIONER

## 2024-04-21 PROCEDURE — 82948 REAGENT STRIP/BLOOD GLUCOSE: CPT | Performed by: INTERNAL MEDICINE

## 2024-04-21 PROCEDURE — 36415 COLL VENOUS BLD VENIPUNCTURE: CPT | Performed by: NURSE PRACTITIONER

## 2024-04-21 PROCEDURE — 84100 ASSAY OF PHOSPHORUS: CPT | Performed by: INTERNAL MEDICINE

## 2024-04-21 PROCEDURE — 97162 PT EVAL MOD COMPLEX 30 MIN: CPT

## 2024-04-21 PROCEDURE — 84100 ASSAY OF PHOSPHORUS: CPT | Performed by: HOSPITALIST

## 2024-04-21 PROCEDURE — 80053 COMPREHEN METABOLIC PANEL: CPT | Performed by: NURSE PRACTITIONER

## 2024-04-21 PROCEDURE — 63710000001 INSULIN GLARGINE PER 5 UNITS: Performed by: INTERNAL MEDICINE

## 2024-04-21 PROCEDURE — 83735 ASSAY OF MAGNESIUM: CPT | Performed by: NURSE PRACTITIONER

## 2024-04-21 PROCEDURE — 97530 THERAPEUTIC ACTIVITIES: CPT

## 2024-04-21 PROCEDURE — 81003 URINALYSIS AUTO W/O SCOPE: CPT | Performed by: INTERNAL MEDICINE

## 2024-04-21 PROCEDURE — 97166 OT EVAL MOD COMPLEX 45 MIN: CPT

## 2024-04-21 RX ORDER — NICOTINE POLACRILEX 4 MG
15 LOZENGE BUCCAL
Status: DISCONTINUED | OUTPATIENT
Start: 2024-04-21 | End: 2024-04-22 | Stop reason: HOSPADM

## 2024-04-21 RX ORDER — IBUPROFEN 600 MG/1
1 TABLET ORAL
Status: DISCONTINUED | OUTPATIENT
Start: 2024-04-21 | End: 2024-04-22 | Stop reason: HOSPADM

## 2024-04-21 RX ORDER — DEXTROSE MONOHYDRATE 25 G/50ML
25 INJECTION, SOLUTION INTRAVENOUS
Status: DISCONTINUED | OUTPATIENT
Start: 2024-04-21 | End: 2024-04-22 | Stop reason: HOSPADM

## 2024-04-21 RX ADMIN — ONDANSETRON 4 MG: 2 INJECTION INTRAMUSCULAR; INTRAVENOUS at 17:24

## 2024-04-21 RX ADMIN — HEPARIN SODIUM 5000 UNITS: 5000 INJECTION INTRAVENOUS; SUBCUTANEOUS at 15:05

## 2024-04-21 RX ADMIN — ATORVASTATIN CALCIUM 80 MG: 40 TABLET, FILM COATED ORAL at 20:34

## 2024-04-21 RX ADMIN — OXCARBAZEPINE 300 MG: 600 TABLET, FILM COATED ORAL at 15:06

## 2024-04-21 RX ADMIN — ALUMINUM HYDROXIDE, MAGNESIUM HYDROXIDE, AND SIMETHICONE 15 ML: 2400; 240; 2400 SUSPENSION ORAL at 09:51

## 2024-04-21 RX ADMIN — ONDANSETRON 4 MG: 2 INJECTION INTRAMUSCULAR; INTRAVENOUS at 09:47

## 2024-04-21 RX ADMIN — Medication 10 ML: at 20:34

## 2024-04-21 RX ADMIN — ALUMINUM HYDROXIDE, MAGNESIUM HYDROXIDE, AND SIMETHICONE 15 ML: 2400; 240; 2400 SUSPENSION ORAL at 03:07

## 2024-04-21 RX ADMIN — POTASSIUM, SODIUM PHOSPHATES 280 MG-160 MG-250 MG ORAL POWDER PACKET 2 PACKET: POWDER IN PACKET at 17:27

## 2024-04-21 RX ADMIN — HEPARIN SODIUM 5000 UNITS: 5000 INJECTION INTRAVENOUS; SUBCUTANEOUS at 05:33

## 2024-04-21 RX ADMIN — INSULIN GLARGINE 20 UNITS: 100 INJECTION, SOLUTION SUBCUTANEOUS at 09:57

## 2024-04-21 RX ADMIN — Medication 10 ML: at 08:49

## 2024-04-21 RX ADMIN — Medication 2 PACKET: at 06:28

## 2024-04-21 RX ADMIN — INSULIN LISPRO 8 UNITS: 100 INJECTION, SOLUTION INTRAVENOUS; SUBCUTANEOUS at 09:22

## 2024-04-21 RX ADMIN — TIZANIDINE 4 MG: 4 TABLET ORAL at 03:08

## 2024-04-21 RX ADMIN — CITALOPRAM HYDROBROMIDE 20 MG: 20 TABLET ORAL at 08:31

## 2024-04-21 RX ADMIN — OXCARBAZEPINE 300 MG: 600 TABLET, FILM COATED ORAL at 08:31

## 2024-04-21 RX ADMIN — ALUMINUM HYDROXIDE, MAGNESIUM HYDROXIDE, AND SIMETHICONE 15 ML: 2400; 240; 2400 SUSPENSION ORAL at 17:24

## 2024-04-21 RX ADMIN — GABAPENTIN 600 MG: 300 CAPSULE ORAL at 05:33

## 2024-04-21 RX ADMIN — ONDANSETRON 4 MG: 2 INJECTION INTRAMUSCULAR; INTRAVENOUS at 03:08

## 2024-04-21 RX ADMIN — ONDANSETRON 4 MG: 2 INJECTION INTRAMUSCULAR; INTRAVENOUS at 22:54

## 2024-04-21 RX ADMIN — GABAPENTIN 600 MG: 300 CAPSULE ORAL at 22:54

## 2024-04-21 RX ADMIN — LISINOPRIL 10 MG: 5 TABLET ORAL at 08:31

## 2024-04-21 RX ADMIN — GABAPENTIN 600 MG: 300 CAPSULE ORAL at 15:05

## 2024-04-21 RX ADMIN — OXCARBAZEPINE 300 MG: 600 TABLET, FILM COATED ORAL at 20:34

## 2024-04-21 RX ADMIN — ASPIRIN 325 MG ORAL TABLET 325 MG: 325 PILL ORAL at 08:31

## 2024-04-21 RX ADMIN — HEPARIN SODIUM 5000 UNITS: 5000 INJECTION INTRAVENOUS; SUBCUTANEOUS at 22:54

## 2024-04-21 RX ADMIN — INSULIN LISPRO 6 UNITS: 100 INJECTION, SOLUTION INTRAVENOUS; SUBCUTANEOUS at 09:21

## 2024-04-21 RX ADMIN — FAMOTIDINE 20 MG: 10 INJECTION INTRAVENOUS at 08:31

## 2024-04-21 NOTE — PLAN OF CARE
Problem: Skin Injury Risk Increased  Goal: Skin Health and Integrity  Outcome: Ongoing, Progressing  Intervention: Optimize Skin Protection  Recent Flowsheet Documentation  Taken 4/21/2024 0345 by Sravanthi Vogt LPN  Head of Bed (HOB) Positioning: HOB elevated  Taken 4/21/2024 0007 by Sravanthi Vogt LPN  Head of Bed (HOB) Positioning: HOB elevated  Taken 4/20/2024 1937 by Sravanthi Vogt LPN  Pressure Reduction Techniques: frequent weight shift encouraged  Head of Bed (HOB) Positioning: HOB elevated  Pressure Reduction Devices:   alternating pressure pump (ADD)   specialty bed utilized  Skin Protection: adhesive use limited     Problem: Adult Inpatient Plan of Care  Goal: Plan of Care Review  Outcome: Ongoing, Progressing  Goal: Patient-Specific Goal (Individualized)  Outcome: Ongoing, Progressing  Goal: Absence of Hospital-Acquired Illness or Injury  Outcome: Ongoing, Progressing  Intervention: Identify and Manage Fall Risk  Recent Flowsheet Documentation  Taken 4/21/2024 0345 by Sravanthi Vogt LPN  Safety Promotion/Fall Prevention:   activity supervised   assistive device/personal items within reach  Taken 4/21/2024 0200 by Sravanthi Vogt LPN  Safety Promotion/Fall Prevention:   activity supervised   assistive device/personal items within reach  Taken 4/21/2024 0007 by Sravanthi Vogt LPN  Safety Promotion/Fall Prevention:   activity supervised   assistive device/personal items within reach  Taken 4/20/2024 2200 by Sravanthi Vogt LPN  Safety Promotion/Fall Prevention:   activity supervised   assistive device/personal items within reach  Taken 4/20/2024 1937 by Sravanthi Vogt LPN  Safety Promotion/Fall Prevention:   activity supervised   assistive device/personal items within reach  Intervention: Prevent Skin Injury  Recent Flowsheet Documentation  Taken 4/21/2024 0345 by Sravanthi Vogt LPN  Body Position: position changed independently  Taken 4/21/2024 0007 by Sravanthi Vogt LPN  Body Position:  position changed independently  Taken 4/20/2024 1937 by Sravanthi Vogt LPN  Body Position: position changed independently  Skin Protection: adhesive use limited  Intervention: Prevent and Manage VTE (Venous Thromboembolism) Risk  Recent Flowsheet Documentation  Taken 4/21/2024 0345 by Sravanthi Vogt LPN  Activity Management: activity encouraged  Range of Motion: active ROM (range of motion) encouraged  Taken 4/21/2024 0007 by Sravanthi Vogt LPN  Activity Management: activity encouraged  VTE Prevention/Management:   bilateral   sequential compression devices off   patient refused intervention  Range of Motion: active ROM (range of motion) encouraged  Taken 4/20/2024 1937 by Sravanthi Vogt LPN  Activity Management: activity encouraged  VTE Prevention/Management:   bilateral   sequential compression devices off   patient refused intervention  Range of Motion: active ROM (range of motion) encouraged  Intervention: Prevent Infection  Recent Flowsheet Documentation  Taken 4/21/2024 0345 by Sravanthi Vogt LPN  Infection Prevention:   environmental surveillance performed   equipment surfaces disinfected  Taken 4/21/2024 0200 by Sravanthi Vogt LPN  Infection Prevention:   environmental surveillance performed   equipment surfaces disinfected  Taken 4/21/2024 0007 by Sravanthi Vogt LPN  Infection Prevention:   environmental surveillance performed   equipment surfaces disinfected  Taken 4/20/2024 2200 by Sravanthi Vogt LPN  Infection Prevention:   environmental surveillance performed   equipment surfaces disinfected  Taken 4/20/2024 1937 by Sravanthi Vogt LPN  Infection Prevention:   environmental surveillance performed   equipment surfaces disinfected  Goal: Optimal Comfort and Wellbeing  Outcome: Ongoing, Progressing  Intervention: Provide Person-Centered Care  Recent Flowsheet Documentation  Taken 4/20/2024 1937 by Sravanthi Vogt LPN  Trust Relationship/Rapport:   care explained   empathic listening  provided   questions answered   questions encouraged   reassurance provided   thoughts/feelings acknowledged  Goal: Readiness for Transition of Care  Outcome: Ongoing, Progressing     Problem: Fall Injury Risk  Goal: Absence of Fall and Fall-Related Injury  Outcome: Ongoing, Progressing  Intervention: Identify and Manage Contributors  Recent Flowsheet Documentation  Taken 4/21/2024 0345 by Sravanthi Vogt LPN  Medication Review/Management: medications reviewed  Taken 4/21/2024 0200 by Sravanthi Votg LPN  Medication Review/Management: medications reviewed  Taken 4/21/2024 0007 by Sravatnhi Vogt LPN  Medication Review/Management: medications reviewed  Taken 4/20/2024 2200 by Sravanthi Vogt LPN  Medication Review/Management: medications reviewed  Taken 4/20/2024 1937 by Sravanthi Vogt LPN  Medication Review/Management: medications reviewed  Intervention: Promote Injury-Free Environment  Recent Flowsheet Documentation  Taken 4/21/2024 0345 by Sravanthi Vogt LPN  Safety Promotion/Fall Prevention:   activity supervised   assistive device/personal items within reach  Taken 4/21/2024 0200 by Sravanthi Vogt LPN  Safety Promotion/Fall Prevention:   activity supervised   assistive device/personal items within reach  Taken 4/21/2024 0007 by Sravanthi Vogt LPN  Safety Promotion/Fall Prevention:   activity supervised   assistive device/personal items within reach  Taken 4/20/2024 2200 by Sravanthi Vogt LPN  Safety Promotion/Fall Prevention:   activity supervised   assistive device/personal items within reach  Taken 4/20/2024 1937 by Sravanthi Vogt LPN  Safety Promotion/Fall Prevention:   activity supervised   assistive device/personal items within reach     Problem: Diabetes Comorbidity  Goal: Blood Glucose Level Within Targeted Range  Outcome: Ongoing, Progressing     Problem: Adjustment to Illness (Stroke, Ischemic/Transient Ischemic Attack)  Goal: Optimal Coping  Outcome: Ongoing, Progressing  Intervention:  Support Psychosocial Response to Stroke  Recent Flowsheet Documentation  Taken 4/20/2024 1937 by Sravanthi Vogt LPN  Family/Support System Care: self-care encouraged     Problem: Bowel Elimination Impaired (Stroke, Ischemic/Transient Ischemic Attack)  Goal: Effective Bowel Elimination  Outcome: Ongoing, Progressing     Problem: Cerebral Tissue Perfusion (Stroke, Ischemic/Transient Ischemic Attack)  Goal: Optimal Cerebral Tissue Perfusion  Outcome: Ongoing, Progressing     Problem: Cognitive Impairment (Stroke, Ischemic/Transient Ischemic Attack)  Goal: Optimal Cognitive Function  Outcome: Ongoing, Progressing     Problem: Communication Impairment (Stroke, Ischemic/Transient Ischemic Attack)  Goal: Improved Communication Skills  Outcome: Ongoing, Progressing     Problem: Functional Ability Impaired (Stroke, Ischemic/Transient Ischemic Attack)  Goal: Optimal Functional Ability  Outcome: Ongoing, Progressing  Intervention: Optimize Functional Ability  Recent Flowsheet Documentation  Taken 4/21/2024 0345 by Sravanthi Vogt LPN  Activity Management: activity encouraged  Taken 4/21/2024 0007 by Sravanthi Vogt LPN  Activity Management: activity encouraged  Taken 4/20/2024 1937 by Sravanthi Vogt LPN  Activity Management: activity encouraged     Problem: Respiratory Compromise (Stroke, Ischemic/Transient Ischemic Attack)  Goal: Effective Oxygenation and Ventilation  Outcome: Ongoing, Progressing  Intervention: Optimize Oxygenation and Ventilation  Recent Flowsheet Documentation  Taken 4/21/2024 0345 by Sravanthi Vogt LPN  Head of Bed (HOB) Positioning: HOB elevated  Taken 4/21/2024 0007 by Sravanthi Vogt LPN  Head of Bed (HOB) Positioning: HOB elevated  Taken 4/20/2024 1937 by Sravanthi Vogt LPN  Head of Bed (HOB) Positioning: HOB elevated     Problem: Sensorimotor Impairment (Stroke, Ischemic/Transient Ischemic Attack)  Goal: Improved Sensorimotor Function  Outcome: Ongoing, Progressing  Intervention: Optimize  Range of Motion, Motor Control and Function  Recent Flowsheet Documentation  Taken 4/21/2024 0345 by Sravanthi Vogt LPN  Positioning/Transfer Devices:   pillows   in use  Range of Motion: active ROM (range of motion) encouraged  Taken 4/21/2024 0007 by Sravanthi Vogt LPN  Positioning/Transfer Devices:   pillows   in use  Range of Motion: active ROM (range of motion) encouraged  Taken 4/20/2024 1937 by Sravanthi Vogt LPN  Positioning/Transfer Devices:   pillows   in use  Range of Motion: active ROM (range of motion) encouraged  Intervention: Optimize Sensory and Perceptual Ability  Recent Flowsheet Documentation  Taken 4/20/2024 1937 by Sravanthi Vogt LPN  Pressure Reduction Techniques: frequent weight shift encouraged  Pressure Reduction Devices:   alternating pressure pump (ADD)   specialty bed utilized     Problem: Swallowing Impairment (Stroke, Ischemic/Transient Ischemic Attack)  Goal: Optimal Eating and Swallowing without Aspiration  Outcome: Ongoing, Progressing     Problem: Urinary Elimination Impaired (Stroke, Ischemic/Transient Ischemic Attack)  Goal: Effective Urinary Elimination  Outcome: Ongoing, Progressing   Goal Outcome Evaluation:

## 2024-04-21 NOTE — PLAN OF CARE
Goal Outcome Evaluation:  Plan of Care Reviewed With: patient, spouse           Outcome Evaluation: Karly Zayas is a 53 y.o. female with hx of DM, Neuropathy, anxiety and depression with presents with AMS due to DKA. MRI reveals acute infarct L thalamus.  At baseline, pt lives with her  and 15 y.o. Autistic daughter in a 2 story home with 1STE.  Pt is typically independent without AD but has a SPC and RW if needed.  She reports hx of multiple falls.Pt states she has been limited  in moblity to primarily in her home since COVID. At time of PT evaluation, she is A&O x 4.  She performed bed mobility independently and was able to don ankle brace.  Pt with decreased BLE strength and neuropathy in both feet. Coordination WFL UEs and LEs. No visual deficits noted. She requires HHA to walk 30' without AD. Pt exhibits elevated HR with mobility. Pt was encouraged to use RW at home until more steady and spouse was instructed in how to provide CGA on stairs.  Pt would benefit from HH PT for strengthening and balance.  Pt and spouse verbalize agreement.      Anticipated Discharge Disposition (PT): home with assist, home with home health

## 2024-04-21 NOTE — PROGRESS NOTES
"Subjective   Karlycisco Zayas is a 53 y.o. female.   Seen for diabetes f/u.  Feeling better.  Ate breakfast & lunch.   Restarted insulin with plans to go home this evening, but sugar dropped.  Had Lantus this am since the original plan was to restart the pump tomorrow.  Had D5 1/2NS running overnight @ 50 cc/h. Decreased to 20 cc/h this am due to high sugar.      Objective     /66 (BP Location: Left arm, Patient Position: Sitting)   Pulse 88   Temp 97.9 °F (36.6 °C) (Oral)   Resp 14   Ht 175.3 cm (69.02\")   Wt 78.6 kg (173 lb 4.5 oz)   LMP 06/06/2018   SpO2 96%   BMI 25.58 kg/m²   Blood sugar  270/255 this am,  90/86 @ lunch, 106 @ 2p, 42 56 @ 5p; cr 06, Co2 22, AG 14    ASSESSMENT  Diabetes type 1, with hyperglycemia  Patient is Improving    PLAN    Had juice & will eat supper now.  IV fluids increased back up to 75 cc/h.  Decreased pump basal rate to 0.2 units/h until morning.  Will have diabetes educator check her in am.         Nai Patton MD  4/21/2024  18:17 EDT    "

## 2024-04-21 NOTE — NURSING NOTE
Blood sugar checked pt was 42. Dr. Patton was at bedside gave nurse a verbal order to increase fluids to 75 ml/hr. Nurse also gave pt 8 oz of juice. Will continue to recheck sugar.

## 2024-04-21 NOTE — THERAPY EVALUATION
Patient Name: Karly Zayas  : 1970    MRN: [id number]                              Today's Date: 2024       Admit Date: 2024    Visit Dx:     ICD-10-CM ICD-9-CM   1. Diabetic ketoacidosis without coma associated with type 1 diabetes mellitus  E10.10 250.13   2. TRINA (acute kidney injury)  N17.9 584.9   3. Generalized abdominal pain  R10.84 789.07     Patient Active Problem List   Diagnosis    Pelvic pain    Abrasion    Chronic back pain    Closed fracture of head of metatarsal bone    Contusion of right foot    DDD (degenerative disc disease), lumbar    Depression    Goiter    Hand paresthesia    Hyperlipidemia, mixed    Insulin pump status    Myofascial pain    Neuritis of foot    Obesity    Orthopedic aftercare    Encounter for immunization    Status post epidural steroid injection    Type 1 diabetes mellitus with diabetic neuropathy    Type 1 diabetes mellitus with hyperglycemia    Vitamin D deficiency    Intermediate stage nonexudative age-related macular degeneration of both eyes    Nuclear sclerotic cataract of both eyes    DKA, type 1    Acute CVA (cerebrovascular accident)    Hypertension, uncontrolled     Past Medical History:   Diagnosis Date    Anxiety     Autonomic neuropathy associated with type 1 diabetes mellitus     Bipolar 2 disorder     Cataracts, bilateral     Diabetes mellitus     TYPE 1    Diabetes mellitus type I     Diabetic retinopathy associated with type 1 diabetes mellitus     Disease of thyroid gland     DKA (diabetic ketoacidoses)     Foot fracture, right     High cholesterol     Kidney stone     Macular degeneration, bilateral     Neuropathy     Pelvic pain      Past Surgical History:   Procedure Laterality Date    ANKLE SURGERY Right 2015     SECTION  2008    COLONOSCOPY N/A 2023    Procedure: COLONOSCOPY;  Surgeon: Lori Cleary MD;  Location: [facility] ENDOSCOPY;  Service: Gastroenterology;  Laterality: N/A;    DIAGNOSTIC LAPAROSCOPY   1991    DIAGNOSTIC LAPAROSCOPY N/A 11/1/2017    Procedure: LAPAROSCOPY, PARTIAL LEFT SALPINGECTOMY;  Surgeon: Arleen Quintana MD;  Location: [facility];  Service:     DILATATION AND CURETTAGE N/A 7/10/2018    Procedure: FROZEN DILATATION AND CURETTAGE POWER MORECELLATOR;  Surgeon: Nicole Shannon MD;  Location: [facility] OR;  Service: Gynecology    HYSTERECTOMY SUPRACERVICAL N/A 7/10/2018    Procedure: LAPAROSCOPIC SUPRACERVICAL HYSTERECTOMY WITH MORCELLATOR, LEFT SALPINGECTOMY;  Surgeon: Nicole Shannon MD;  Location: [facility] OR;  Service: Gynecology    HYSTEROSCOPY ENDOMETRIAL ABLATION  2009    WITH TUBAL AND RIGHT OVARY REMOVAL    TONSILLECTOMY  1983      General Information       Row Name 04/21/24 1307          Physical Therapy Time and Intention    Document Type evaluation  -CL     Mode of Treatment physical therapy  -CL       Row Name 04/21/24 1307          General Information    Patient Profile Reviewed yes  -CL     Prior Level of Function independent:;all household mobility  Has been limited to home mostly since COVID  -CL     Existing Precautions/Restrictions fall;brace worn when out of bed  L walking boot  -CL     Barriers to Rehab medically complex;previous functional deficit;impaired sensation  -CL       Row Name 04/21/24 1307          Living Environment    People in Home spouse;child(art), dependent  -CL     Name(s) of People in Home Spouse: Ruth 15 y.o. autistic daughter  -CL       Row Name 04/21/24 1307          Home Main Entrance    Number of Stairs, Main Entrance one  -CL       Row Name 04/21/24 1307          Stairs Within Home, Primary    Number of Stairs, Within Home, Primary twelve  2 story home  -CL     Stair Railings, Within Home, Primary railings safe and in good condition  -CL       Row Name 04/21/24 1307          Cognition    Orientation Status (Cognition) oriented x 4  -CL       Row Name 04/21/24 1307          Safety Issues, Functional Mobility    Impairments Affecting Function  (Mobility) balance;sensation/sensory awareness;endurance/activity tolerance;pain  -CL               User Key  (r) = Recorded By, (t) = Taken By, (c) = Cosigned By      Initials Name Provider Type    Zahra Smith PT Physical Therapist                   Mobility       Row Name 04/21/24 1311          Bed Mobility    Bed Mobility bed mobility (all) activities  -CL     All Activities, [place] (Bed Mobility) modified independence  -CL     Assistive Device (Bed Mobility) bed rails  -CL       Row Name 04/21/24 1311          Sit-Stand Transfer    Sit-Stand [place] (Transfers) minimum assist (75% patient effort)  -CL       Row Name 04/21/24 1311          Gait/Stairs (Locomotion)    [place] Level (Gait) minimum assist (75% patient effort)  -CL     Patient was able to Ambulate yes  -CL     Distance in Feet (Gait) 30  -CL     Deviations/Abnormal Patterns (Gait) antalgic  -CL               User Key  (r) = Recorded By, (t) = Taken By, (c) = Cosigned By      Initials Name Provider Type    Zahra Smith PT Physical Therapist                   Obj/Interventions       Row Name 04/21/24 1313          Range of Motion Comprehensive    Comment, General Range of Motion BLE WFL L ankle deferred due to report of fx and wearing L walking boot  -CL       Row Name 04/21/24 1313          Strength Comprehensive (MMT)    Comment, General Manual Muscle Testing (MMT) Assessment Hip flex 3/5 bilaterally, knee ext 4/5 bilaterally  -CL       Row Name 04/21/24 1313          Balance    Balance Assessment sitting static balance;sitting dynamic balance;standing static balance;standing dynamic balance  -CL     Static Sitting Balance independent  -CL     Dynamic Sitting Balance independent  -CL     Position, Sitting Balance sitting edge of bed  able to bend forward to don walking boot with no LOB  -CL     Static Standing Balance minimal assist  -CL     Dynamic Standing Balance minimal assist  -CL     Position/Device Used,  Standing Balance unsupported  -CL       Row Name 04/21/24 1313          Sensory Assessment (Somatosensory)    Sensory Assessment (Somatosensory) bilateral LE  -CL     Bilateral LE Sensory Assessment impaired  -CL               User Key  (r) = Recorded By, (t) = Taken By, (c) = Cosigned By      Initials Name Provider Type    CL Zahra Gomez, PT Physical Therapist                   Goals/Plan       Row Name 04/21/24 1328          Transfer Goal 1 (PT)    Activity/Assistive Device (Transfer Goal 1, PT) sit-to-stand/stand-to-sit;bed-to-chair/chair-to-bed  -CL     [place] Level/Cues Needed (Transfer Goal 1, PT) modified independence  -CL     Time Frame (Transfer Goal 1, PT) long term goal (LTG);2 weeks  -CL       Row Name 04/21/24 1328          Gait Training Goal 1 (PT)    Activity/Assistive Device (Gait Training Goal 1, PT) gait (walking locomotion);assistive device use  -CL     [place] Level (Gait Training Goal 1, PT) modified independence  -CL     Distance (Gait Training Goal 1, PT) 100'  -CL     Time Frame (Gait Training Goal 1, PT) long term goal (LTG);2 weeks  -CL       Row Name 04/21/24 1328          Stairs Goal 1 (PT)    Activity/Assistive Device (Stairs Goal 1, PT) ascending stairs;descending stairs  -CL     [place] Level/Cues Needed (Stairs Goal 1, PT) supervision required  -CL     Number of Stairs (Stairs Goal 1, PT) 12  -CL     Time Frame (Stairs Goal 1, PT) long term goal (LTG);2 weeks  -CL       Row Name 04/21/24 1328          Therapy Assessment/Plan (PT)    Planned Therapy Interventions (PT) balance training;bed mobility training;gait training;neuromuscular re-education;patient/family education;stair training;strengthening;transfer training  -CL               User Key  (r) = Recorded By, (t) = Taken By, (c) = Cosigned By      Initials Name Provider Type    CL Zahra Gomez, PT Physical Therapist                   Clinical Impression       Row Name 04/21/24 1315          Pain     Pretreatment Pain Rating 5/10  -CL     Posttreatment Pain Rating 5/10  -CL     Pain Location - Side/Orientation Left  -CL     Pain Location - ankle  -CL     Pain Intervention(s) Repositioned  -CL       Row Name 04/21/24 9911          Plan of Care Review    Plan of Care Reviewed With patient;spouse  -CL     Outcome Evaluation Karly Zayas is a 53 y.o. female with hx of DM, Neuropathy, anxiety and depression with presents with AMS due to DKA. MRI reveals acute infarct L thalamus.  At baseline, pt lives with her  and 15 y.o. Autistic daughter in a 2 story home with 1STE.  Pt is typically independent without AD but has a SPC and RW if needed.  She reports hx of multiple falls.Pt states she has been limited  in moblity to primarily in her home since COVID. At time of PT evaluation, she is A&O x 4.  She performed bed mobility independently and was able to don ankle brace.  Pt with decreased BLE strength and neuropathy in both feet. Coordination WFL UEs and LEs. No visual deficits noted. She requires HHA to walk 30' without AD. Pt exhibits elevated HR with mobility. Pt was encouraged to use RW at home until more steady and spouse was instructed in how to provide CGA on stairs.  Pt would benefit from HH PT for strengthening and balance.  Pt and spouse verbalize agreement.  -       Row Name 04/21/24 1150          Therapy Assessment/Plan (PT)    Rehab Potential (PT) good, to achieve stated therapy goals  -CL     Criteria for Skilled Interventions Met (PT) yes;skilled treatment is necessary  -CL     Therapy Frequency (PT) 3 times/wk  -CL     Predicted Duration of Therapy Intervention (PT) until discharge  -CL       Row Name 04/21/24 6224          Vital Signs    Pretreatment Heart Rate (beats/min) 111  -CL     Intratreatment Heart Rate (beats/min) 124  -CL     Posttreatment Heart Rate (beats/min) 112  -CL     O2 Delivery Pre Treatment room air  -       Row Name 04/21/24 4763          Positioning and Restraints     Pre-Treatment Position in bed  -CL     Post Treatment Position bed  -CL     In Bed notified nsg;supine;call light within reach;exit alarm on;with family/caregiver  -CL               User Key  (r) = Recorded By, (t) = Taken By, (c) = Cosigned By      Initials Name Provider Type    Zahra Smith, PT Physical Therapist                   Outcome Measures       Row Name 04/21/24 1329          How much help from another person do you currently need...    Turning from your back to your side while in flat bed without using bedrails? 4  -CL     Moving from lying on back to sitting on the side of a flat bed without bedrails? 4  -CL     Moving to and from a bed to a chair (including a wheelchair)? 3  -CL     Standing up from a chair using your arms (e.g., wheelchair, bedside chair)? 3  -CL     Climbing 3-5 steps with a railing? 3  -CL     To walk in hospital room? 3  -CL     AM-PAC 6 Clicks Score (PT) 20  -CL     Highest Level of Mobility Goal 6 --> Walk 10 steps or more  -CL       Row Name 04/21/24 1329          Modified [place] Scale    Modified [place] Scale 0 - No Symptoms at all.  -CL       Row Name 04/21/24 1329 04/21/24 1250       Functional Assessment    Outcome Measure Options AM-PAC 6 Clicks Basic Mobility (PT);Modified [place]  -CL AM-PAC 6 Clicks Daily Activity (OT)  -MS              User Key  (r) = Recorded By, (t) = Taken By, (c) = Cosigned By      Initials Name Provider Type    Neda Cuello, OT Occupational Therapist    Zahra Smith, PT Physical Therapist                                 Physical Therapy Education       Title: PT OT SLP Therapies (Done)       Topic: Physical Therapy (Done)       Point: Mobility training (Done)       Learning Progress Summary             Patient Acceptance, E,TB, VU by CL at 4/21/2024 1330   Significant Other Acceptance, E,TB, VU by CL at 4/21/2024 1330                         Point: Precautions (Done)       Learning Progress Summary             Patient  Acceptance, E,TB, VU by CL at 4/21/2024 1330   Significant Other Acceptance, E,TB, VU by CL at 4/21/2024 1330                                         User Key       Initials Effective Dates Name Provider Type Discipline     03/02/22 -  Zahra Gomez, PT Physical Therapist PT                  PT Recommendation and Plan  Planned Therapy Interventions (PT): balance training, bed mobility training, gait training, neuromuscular re-education, patient/family education, stair training, strengthening, transfer training  Plan of Care Reviewed With: patient, spouse  Outcome Evaluation: Karly Zayas is a 53 y.o. female with hx of DM, Neuropathy, anxiety and depression with presents with AMS due to DKA. MRI reveals acute infarct L thalamus.  At baseline, pt lives with her  and 15 y.o. Autistic daughter in a 2 story home with 1STE.  Pt is typically independent without AD but has a SPC and RW if needed.  She reports hx of multiple falls.Pt states she has been limited  in moblity [facility] At time of PT evaluation, she is A&O x 4.  She performed bed mobility independently and was able to don ankle brace.  Pt with decreased BLE strength and neuropathy in both feet. Coordination WFL UEs and LEs. No visual deficits noted. She requires HHA to walk 30' without AD. Pt exhibits elevated HR with mobility. Pt was encouraged to use RW at home until more steady and spouse was instructed in how to provide CGA on stairs.  Pt would benefit from HH PT for strengthening and balance.  Pt and spouse verbalize agreement.     Time Calculation:   PT Evaluation Complexity  History, PT Evaluation Complexity: 3 or more personal factors and/or comorbidities  Examination of Body Systems (PT Eval Complexity): total of 3 or more elements  Clinical Presentation (PT Evaluation Complexity): evolving  Clinical Decision Making (PT Evaluation Complexity): moderate complexity  Overall Complexity (PT Evaluation  Complexity): moderate complexity     PT Charges       Row Name 04/21/24 1330             Time Calculation    Start Time 1004  -CL      Stop Time 1031  -CL      Time Calculation (min) 27 min  -CL      PT Received On 04/21/24  -CL      PT - Next Appointment 04/23/24  -CL      PT Goal Re-Cert Due Date 05/05/24  -CL         Time Calculation- PT    Total Timed Code Minutes- PT 8 minute(s)  -CL                User Key  (r) = Recorded By, (t) = Taken By, (c) = Cosigned By      Initials Name Provider Type    CL Zahra Gomez, PT Physical Therapist                  Therapy Charges for Today       Code Description Service Date Service Provider Modifiers Qty    86364887414 HC PT EVAL MOD COMPLEXITY 4 4/21/2024 Zahra Gomez, PT GP 1    [id number]  PT THERAPEUTIC ACT EA 15 MIN 4/21/2024 Zahra Gomez, PT GP 1            PT G-Codes  Outcome Measure Options: AM-PAC 6 Clicks Basic Mobility (PT), Modified [place]  AM-PAC 6 Clicks Score (PT): 20  AM-PAC 6 Clicks Score (OT): 21  Modified [place]ran Scale: 0 - No Symptoms at all.  PT Discharge Summary  Anticipated Discharge Disposition (PT): home with assist, home with home health    Zahra Gomez PT  4/21/2024     Humira Counseling:  I discussed with the patient the risks of adalimumab including but not limited to myelosuppression, immunosuppression, autoimmune hepatitis, demyelinating diseases, lymphoma, and serious infections.  The patient understands that monitoring is required including a PPD at baseline and must alert us or the primary physician if symptoms of infection or other concerning signs are noted.

## 2024-04-21 NOTE — PLAN OF CARE
Problem: Skin Injury Risk Increased  Goal: Skin Health and Integrity  Outcome: Ongoing, Progressing     Problem: Adult Inpatient Plan of Care  Goal: Plan of Care Review  Outcome: Ongoing, Progressing  Goal: Patient-Specific Goal (Individualized)  Outcome: Ongoing, Progressing  Goal: Absence of Hospital-Acquired Illness or Injury  Outcome: Ongoing, Progressing  Intervention: Identify and Manage Fall Risk  Recent Flowsheet Documentation  Taken 4/21/2024 1600 by Kathy Veliz LPN  Safety Promotion/Fall Prevention: safety round/check completed  Taken 4/21/2024 1500 by Kathy Veliz LPN  Safety Promotion/Fall Prevention: safety round/check completed  Taken 4/21/2024 1400 by Kathy Veliz LPN  Safety Promotion/Fall Prevention: safety round/check completed  Taken 4/21/2024 1311 by Kathy Veliz LPN  Safety Promotion/Fall Prevention: safety round/check completed  Taken 4/21/2024 1230 by Kathy Veliz LPN  Safety Promotion/Fall Prevention: safety round/check completed  Taken 4/21/2024 1100 by Kathy Veliz LPN  Safety Promotion/Fall Prevention: safety round/check completed  Taken 4/21/2024 1000 by Kathy Veliz LPN  Safety Promotion/Fall Prevention: safety round/check completed  Taken 4/21/2024 0900 by Kathy Veliz LPN  Safety Promotion/Fall Prevention: safety round/check completed  Taken 4/21/2024 0700 by Kathy Veliz LPN  Safety Promotion/Fall Prevention: safety round/check completed  Intervention: Prevent Infection  Recent Flowsheet Documentation  Taken 4/21/2024 1600 by Kathy Veliz LPN  Infection Prevention:   hand hygiene promoted   rest/sleep promoted   single patient room provided  Taken 4/21/2024 1500 by Kathy Veliz LPN  Infection Prevention:   hand hygiene promoted   single patient room provided   rest/sleep promoted  Taken 4/21/2024 1400 by Kathy Veliz LPN  Infection Prevention:   hand hygiene promoted   rest/sleep promoted   single patient room provided  Taken 4/21/2024 1311 by Kathy Veliz  LPN  Infection Prevention:   hand hygiene promoted   rest/sleep promoted   single patient room provided  Taken 4/21/2024 1230 by Kathy Veliz LPN  Infection Prevention:   hand hygiene promoted   rest/sleep promoted   single patient room provided  Taken 4/21/2024 1100 by Kathy Veliz LPN  Infection Prevention:   hand hygiene promoted   rest/sleep promoted   single patient room provided  Taken 4/21/2024 1000 by Kathy Veliz LPN  Infection Prevention:   hand hygiene promoted   rest/sleep promoted   single patient room provided  Taken 4/21/2024 0900 by Kathy Veliz LPN  Infection Prevention:   hand hygiene promoted   rest/sleep promoted   single patient room provided  Taken 4/21/2024 0700 by Kathy Veliz LPN  Infection Prevention:   hand hygiene promoted   rest/sleep promoted   single patient room provided  Goal: Optimal Comfort and Wellbeing  Outcome: Ongoing, Progressing  Goal: Readiness for Transition of Care  Outcome: Ongoing, Progressing     Problem: Fall Injury Risk  Goal: Absence of Fall and Fall-Related Injury  Outcome: Ongoing, Progressing  Intervention: Identify and Manage Contributors  Recent Flowsheet Documentation  Taken 4/21/2024 1600 by Kathy Veliz LPN  Medication Review/Management: medications reviewed  Taken 4/21/2024 1500 by Kathy Veliz LPN  Medication Review/Management: medications reviewed  Taken 4/21/2024 1400 by Kathy Veliz LPN  Medication Review/Management: medications reviewed  Taken 4/21/2024 1311 by Kathy Veliz LPN  Medication Review/Management: medications reviewed  Taken 4/21/2024 1230 by Kathy Veliz LPN  Medication Review/Management: medications reviewed  Taken 4/21/2024 1100 by Kathy Veliz LPN  Medication Review/Management: medications reviewed  Taken 4/21/2024 1000 by Kathy Veliz LPN  Medication Review/Management: medications reviewed  Taken 4/21/2024 0900 by Kathy Veliz LPN  Medication Review/Management: medications reviewed  Taken 4/21/2024 0700 by  Kathy Veliz LPN  Medication Review/Management: medications reviewed  Intervention: Promote Injury-Free Environment  Recent Flowsheet Documentation  Taken 4/21/2024 1600 by Kathy Veliz LPN  Safety Promotion/Fall Prevention: safety round/check completed  Taken 4/21/2024 1500 by Kathy Veliz LPN  Safety Promotion/Fall Prevention: safety round/check completed  Taken 4/21/2024 1400 by Kathy Veliz LPN  Safety Promotion/Fall Prevention: safety round/check completed  Taken 4/21/2024 1311 by Kathy Veliz LPN  Safety Promotion/Fall Prevention: safety round/check completed  Taken 4/21/2024 1230 by Kathy Veliz LPN  Safety Promotion/Fall Prevention: safety round/check completed  Taken 4/21/2024 1100 by Kathy Veliz LPN  Safety Promotion/Fall Prevention: safety round/check completed  Taken 4/21/2024 1000 by Kathy Veliz LPN  Safety Promotion/Fall Prevention: safety round/check completed  Taken 4/21/2024 0900 by Kathy Veliz LPN  Safety Promotion/Fall Prevention: safety round/check completed  Taken 4/21/2024 0700 by Kathy Veliz LPN  Safety Promotion/Fall Prevention: safety round/check completed     Problem: Diabetes Comorbidity  Goal: Blood Glucose Level Within Targeted Range  Outcome: Ongoing, Progressing     Problem: Adjustment to Illness (Stroke, Ischemic/Transient Ischemic Attack)  Goal: Optimal Coping  Outcome: Ongoing, Progressing     Problem: Bowel Elimination Impaired (Stroke, Ischemic/Transient Ischemic Attack)  Goal: Effective Bowel Elimination  Outcome: Ongoing, Progressing     Problem: Cerebral Tissue Perfusion (Stroke, Ischemic/Transient Ischemic Attack)  Goal: Optimal Cerebral Tissue Perfusion  Outcome: Ongoing, Progressing     Problem: Cognitive Impairment (Stroke, Ischemic/Transient Ischemic Attack)  Goal: Optimal Cognitive Function  Outcome: Ongoing, Progressing     Problem: Communication Impairment (Stroke, Ischemic/Transient Ischemic Attack)  Goal: Improved Communication Skills  Outcome:  Ongoing, Progressing     Problem: Functional Ability Impaired (Stroke, Ischemic/Transient Ischemic Attack)  Goal: Optimal Functional Ability  Outcome: Ongoing, Progressing     Problem: Respiratory Compromise (Stroke, Ischemic/Transient Ischemic Attack)  Goal: Effective Oxygenation and Ventilation  Outcome: Ongoing, Progressing     Problem: Sensorimotor Impairment (Stroke, Ischemic/Transient Ischemic Attack)  Goal: Improved Sensorimotor Function  Outcome: Ongoing, Progressing     Problem: Swallowing Impairment (Stroke, Ischemic/Transient Ischemic Attack)  Goal: Optimal Eating and Swallowing without Aspiration  Outcome: Ongoing, Progressing     Problem: Urinary Elimination Impaired (Stroke, Ischemic/Transient Ischemic Attack)  Goal: Effective Urinary Elimination  Outcome: Ongoing, Progressing   Goal Outcome Evaluation:

## 2024-04-21 NOTE — PLAN OF CARE
Goal Outcome Evaluation:  Plan of Care Reviewed With: patient, spouse        Progress: no change  Outcome Evaluation: Pt is a 52 y/o F admitted to [facility] 4/18/24 with c/o hyperglycemia. Hospital dx DKA, noted to have pump malfunction, found to have acute infarct L thalamus. At baseline pt resides with spouse in 2 story home with 1 FAVIO. Pt (I) with ADLs and does not use AD for mobility, however pt noted to have L comminuted dx of distal fifth metatarsal shaft, minimally displaced avulsion fx distal fibular tip. May weight bear with boot with ambulating. Pt has cane and RW if needed. This date pt A&Ox4 on RA in fowlers upon arrival. Pt c/o 5/10 pain L ankle, however agreeable to mobilize with OT/PT. Pt completes bed mobility with supervision, able to don weight bearing boot with set up sitting edge of bed and comes to standing and ambulates with CGA-Julia x2. Pt may benefit from use of RW at dc to increase balance and HHOT to increase (I) with ADLs/ increase activity tolerance. OT will follow while admitted. Anticipate safe to dc home with spouse assist.      Anticipated Discharge Disposition (OT): home with home health, home with assist

## 2024-04-21 NOTE — THERAPY EVALUATION
Patient Name: Karly Zayas  : 1970    MRN: [id number]                              Today's Date: 2024       Admit Date: 2024    Visit Dx:     ICD-10-CM ICD-9-CM   1. Diabetic ketoacidosis without coma associated with type 1 diabetes mellitus  E10.10 250.13   2. TRINA (acute kidney injury)  N17.9 584.9   3. Generalized abdominal pain  R10.84 789.07     Patient Active Problem List   Diagnosis    Pelvic pain    Abrasion    Chronic back pain    Closed fracture of head of metatarsal bone    Contusion of right foot    DDD (degenerative disc disease), lumbar    Depression    Goiter    Hand paresthesia    Hyperlipidemia, mixed    Insulin pump status    Myofascial pain    Neuritis of foot    Obesity    Orthopedic aftercare    Encounter for immunization    Status post epidural steroid injection    Type 1 diabetes mellitus with diabetic neuropathy    Type 1 diabetes mellitus with hyperglycemia    Vitamin D deficiency    Intermediate stage nonexudative age-related macular degeneration of both eyes    Nuclear sclerotic cataract of both eyes    DKA, type 1    Acute CVA (cerebrovascular accident)    Hypertension, uncontrolled     Past Medical History:   Diagnosis Date    Anxiety     Autonomic neuropathy associated with type 1 diabetes mellitus     Bipolar 2 disorder     Cataracts, bilateral     Diabetes mellitus     TYPE 1    Diabetes mellitus type I     Diabetic retinopathy associated with type 1 diabetes mellitus     Disease of thyroid gland     DKA (diabetic ketoacidoses)     Foot fracture, right     High cholesterol     Kidney stone     Macular degeneration, bilateral     Neuropathy     Pelvic pain      Past Surgical History:   Procedure Laterality Date    ANKLE SURGERY Right 2015     SECTION  2008    COLONOSCOPY N/A 2023    Procedure: COLONOSCOPY;  Surgeon: Lori Cleary MD;  Location: [facility] ENDOSCOPY;  Service: Gastroenterology;  Laterality: N/A;    DIAGNOSTIC LAPAROSCOPY   1991    DIAGNOSTIC LAPAROSCOPY N/A 11/1/2017    Procedure: LAPAROSCOPY, PARTIAL LEFT SALPINGECTOMY;  Surgeon: Arleen Quintana MD;  Location: [facility] OR [facility];  Service:     DILATATION AND CURETTAGE N/A 7/10/2018    Procedure: FROZEN DILATATION AND CURETTAGE POWER MORECELLATOR;  Surgeon: Nicole Shannon MD;  Location: [facility] OR;  Service: Gynecology    HYSTERECTOMY SUPRACERVICAL N/A 7/10/2018    Procedure: LAPAROSCOPIC SUPRACERVICAL HYSTERECTOMY WITH MORCELLATOR, LEFT SALPINGECTOMY;  Surgeon: Nicole Shannon MD;  Location: [facility] OR;  Service: Gynecology    HYSTEROSCOPY ENDOMETRIAL ABLATION  2009    WITH TUBAL AND RIGHT OVARY REMOVAL    TONSILLECTOMY  1983      General Information       Row Name 04/21/24 1234          OT Time and Intention    Document Type evaluation  -MS     Mode of Treatment occupational therapy  -MS       Row Name 04/21/24 1234          General Information    Patient Profile Reviewed yes  -MS     Prior Level of Function independent:;ADL's  -MS     Existing Precautions/Restrictions fall  able to weight bear with boot  -MS     Barriers to Rehab medically complex;previous functional deficit;impaired sensation  -MS       Row Name 04/21/24 1234          Occupational Profile    Reason for Services/Referral (Occupational Profile) Pt is a 52 y/o F admitted to [facility] 4/18/24 with c/o hyperglycemia, hospital dx DKA, noted to have pump malfunction. CXR (-) acute. CT head (-) acute. CT A/P (-) acute . MRI brain (+) restricted diffusion within L thalamus, concerns for acute infarct. CTA head (-) acute. PMHx significant for depression, hx CVA, bipolar 2 disorder, and DMI. At baseline pt resides with spouse in 2 story home with 1 FAVIO. Pt (I) with ADLs and does not use AD for mobility, however pt noted to have L comminuted dx of distal fifth metatarsal shaft, minimally displaced avulsion fx distal fibular tip. May weight bear with boot with ambulating. Pt has cane and RW if needed.  -MS     Environmental  Supports and Barriers (Occupational Profile) supportive family  -MS       Row Name 04/21/24 1234          Living Environment    People in Home spouse;child(art), dependent  -MS       Row Name 04/21/24 1234          Home Main Entrance    Number of Stairs, Main Entrance one  -MS       Row Name 04/21/24 1234          Stairs Within Home, Primary    Number of Stairs, Within Home, Primary other (see comments)  2 story home  -MS       Row Name 04/21/24 1234          Cognition    Orientation Status (Cognition) oriented x 4  -MS       Row Name 04/21/24 1234          Safety Issues, Functional Mobility    Impairments Affecting Function (Mobility) pain;endurance/activity tolerance  -MS               User Key  (r) = Recorded By, (t) = Taken By, (c) = Cosigned By      Initials Name Provider Type    Neda Cuello OT Occupational Therapist                     Mobility/ADL's       Row Name 04/21/24 1240          Bed Mobility    Bed Mobility bed mobility (all) activities  -MS     All Activities, [place] (Bed Mobility) supervision  -MS     Assistive Device (Bed Mobility) bed rails  -MS       Row Name 04/21/24 1240          Transfers    Transfers sit-stand transfer  -MS       Row Name 04/21/24 1240          Sit-Stand Transfer    Sit-Stand [place] (Transfers) minimum assist (75% patient effort)  -MS     Comment, (Sit-Stand Transfer) cl HHA  -MS       Row Name 04/21/24 1240          Activities of Daily Living    BADL Assessment/Intervention lower body dressing  -MS       Row Name 04/21/24 1240          Lower Body Dressing Assessment/Training    [place] Level (Lower Body Dressing) don;set up  don boot  -MS     Position (Lower Body Dressing) edge of bed sitting  -MS               User Key  (r) = Recorded By, (t) = Taken By, (c) = Cosigned By      Initials Name Provider Type    Neda Cuello OT Occupational Therapist                   Obj/Interventions       Row Name 04/21/24 1242          Sensory Assessment  "(Somatosensory)    Sensory Assessment (Somatosensory) UE sensation intact  -MS     Sensory Assessment reports neuropathy in cl feet, \"walking on cold bricks\"  -MS       Row Name 04/21/24 1242          Vision Assessment/Intervention    Visual Impairment/Limitations WFL  -MS       Row Name 04/21/24 1242          Range of Motion Comprehensive    Comment, General Range of Motion BUE WNL  -MS       Row Name 04/21/24 1242          Strength Comprehensive (MMT)    Comment, General Manual Muscle Testing (MMT) Assessment BUE grossly 4-/5  -MS       Row Name 04/21/24 1242          Balance    Balance Assessment sitting static balance;sitting dynamic balance;standing static balance;standing dynamic balance  -MS     Static Sitting Balance supervision  -MS     Dynamic Sitting Balance standby assist  -MS     Position, Sitting Balance unsupported;sitting edge of bed  -MS     Static Standing Balance contact guard;minimal assist  -MS     Dynamic Standing Balance minimal assist  -MS     Position/Device Used, Standing Balance unsupported  cl HHA  -MS               User Key  (r) = Recorded By, (t) = Taken By, (c) = Cosigned By      Initials Name Provider Type    Neda Cuello, OT Occupational Therapist                   Goals/Plan       Row Name 04/21/24 1249          Transfer Goal 1 (OT)    Activity/Assistive Device (Transfer Goal 1, OT) transfers, all  -MS     [place] Level/Cues Needed (Transfer Goal 1, OT) modified independence  -MS     Time Frame (Transfer Goal 1, OT) long term goal (LTG);2 weeks  -MS     Progress/Outcome (Transfer Goal 1, OT) new goal  -MS       Row Name 04/21/24 1249          Toileting Goal 1 (OT)    Activity/Device (Toileting Goal 1, OT) toileting skills, all  -MS     [place] Level/Cues Needed (Toileting Goal 1, OT) modified independence  -MS     Time Frame (Toileting Goal 1, OT) long term goal (LTG);2 weeks  -MS     Progress/Outcome (Toileting Goal 1, OT) new goal  -MS       Row Name 04/21/24 1249 "          Problem Specific Goal 1 (OT)    Problem Specific Goal 1 (OT) increase activity tolerance needed for ADL routine >5 minutes without rest break  -MS     Time Frame (Problem Specific Goal 1, OT) long term goal (LTG);2 weeks  -MS     Progress/Outcome (Problem Specific Goal 1, OT) new goal  -MS       Row Name 04/21/24 1249          Therapy Assessment/Plan (OT)    Planned Therapy Interventions (OT) activity tolerance training;adaptive equipment training;BADL retraining;functional balance retraining;IADL retraining;occupation/activity based interventions;passive ROM/stretching;patient/caregiver education/training;transfer/mobility retraining;strengthening exercise;ROM/therapeutic exercise  -MS               User Key  (r) = Recorded By, (t) = Taken By, (c) = Cosigned By      Initials Name Provider Type    MS Riggs Neda, OT Occupational Therapist                   Clinical Impression       Row Name 04/21/24 1245          Pain Assessment    Pretreatment Pain Rating 5/10  -MS     Posttreatment Pain Rating 5/10  -MS     Pain Location - Side/Orientation Left  -MS     Pain Location - ankle  -MS     Pain Intervention(s) Repositioned;Emotional support  -MS       Row Name 04/21/24 4463          Plan of Care Review    Plan of Care Reviewed With patient;spouse  -MS     Progress no change  -MS     Outcome Evaluation Pt is a 54 y/o F admitted to [facility] 4/18/24 with c/o hyperglycemia. Hospital dx DKA, noted to have pump malfunction, found to have acute infarct L thalamus. At baseline pt resides with spouse in 2 story home with 1 FAVIO. Pt (I) with ADLs and does not use AD for mobility, however pt noted to have L comminuted dx of distal fifth metatarsal shaft, minimally displaced avulsion fx distal fibular tip. May weight bear with boot with ambulating. Pt has cane and RW if needed. This date pt A&Ox4 on RA in [place] upon arrival. Pt c/o 5/10 pain L ankle, however agreeable to mobilize with OT/PT. Pt completes bed mobility with  supervision, able to don weight bearing boot with set up sitting edge of bed and comes to standing and ambulates with CGA-Julia x2. Pt may benefit from use of RW at dc to increase balance and HHOT to increase (I) with ADLs/ increase activity tolerance. OT will follow while admitted. Anticipate safe to dc home with spouse assist.  -MS       Row Name 04/21/24 1244          Therapy Assessment/Plan (OT)    Rehab Potential (OT) good, to achieve stated therapy goals  -MS     Criteria for Skilled Therapeutic Interventions Met (OT) yes;meets criteria;skilled treatment is necessary  -MS     Therapy Frequency (OT) 3 times/wk  -MS     Predicted Duration of Therapy Intervention (OT) until d/c  -MS       Row Name 04/21/24 1244          Therapy Plan Review/Discharge Plan (OT)    Anticipated Discharge Disposition (OT) home with home health;home with assist  -MS       Row Name 04/21/24 1244          Vital Signs    Pretreatment Heart Rate (beats/min) 111  -MS     Intratreatment Heart Rate (beats/min) 124  -MS     Posttreatment Heart Rate (beats/min) 111  -MS     O2 Delivery Pre Treatment room air  -MS     O2 Delivery Intra Treatment room air  -MS     O2 Delivery Post Treatment room air  -MS     Pre Patient Position Supine  -MS     Intra Patient Position Standing  -MS     Post Patient Position Supine  -MS       Row Name 04/21/24 1244          Positioning and Restraints    Pre-Treatment Position in bed  -MS     Post Treatment Position bed  -MS     In Bed notified nsg;supine;call light within reach;encouraged to call for assist;exit alarm on;with family/caregiver  -MS               User Key  (r) = Recorded By, (t) = Taken By, (c) = Cosigned By      Initials Name Provider Type    MS Neda Riggs, OT Occupational Therapist                   Outcome Measures       Row Name 04/21/24 1250          How much help from another is currently needed...    Putting on and taking off regular lower body clothing? 3  -MS     Bathing (including  washing, rinsing, and drying) 3  -MS     Toileting (which includes using toilet bed pan or urinal) 3  -MS     Putting on and taking off regular upper body clothing 4  -MS     Taking care of personal grooming (such as brushing teeth) 4  -MS     Eating meals 4  -MS     AM-PAC 6 Clicks Score (OT) 21  -MS       Row Name 04/21/24 1250          Functional Assessment    Outcome Measure Options AM-PAC 6 Clicks Daily Activity (OT)  -MS               User Key  (r) = Recorded By, (t) = Taken By, (c) = Cosigned By      Initials Name Provider Type    MS Neda Riggs OT Occupational Therapist                    Occupational Therapy Education       Title: PT OT SLP Therapies (Done)       Topic: Occupational Therapy (Done)       Point: ADL training (Done)       Description:   Instruct learner(s) on proper safety adaptation and remediation techniques during self care or transfers.   Instruct in proper use of assistive devices.                  Learning Progress Summary             Patient Acceptance, E,TB, VU by MS at 4/21/2024 1250                         Point: Precautions (Done)       Description:   Instruct learner(s) on prescribed precautions during self-care and functional transfers.                  Learning Progress Summary             Patient Acceptance, E,TB, VU by MS at 4/21/2024 1250                         Point: Body mechanics (Done)       Description:   Instruct learner(s) on proper positioning and spine alignment during self-care, functional mobility activities and/or exercises.                  Learning Progress Summary             Patient Acceptance, E,TB, VU by MS at 4/21/2024 1250                                         User Key       Initials Effective Dates Name Provider Type Discipline    MS 07/13/22 -  Neda Riggs OT Occupational Therapist OT                  OT Recommendation and Plan  Planned Therapy Interventions (OT): activity tolerance training, adaptive equipment training, BADL retraining,  functional balance retraining, IADL retraining, occupation/activity based interventions, passive ROM/stretching, patient/caregiver education/training, transfer/mobility retraining, strengthening exercise, ROM/therapeutic exercise  Therapy Frequency (OT): 3 times/wk  Plan of Care Review  Plan of Care Reviewed With: patient, spouse  Progress: no change  Outcome Evaluation: Pt is a 52 y/o F admitted to [facility] 4/18/24 with c/o hyperglycemia. Hospital dx DKA, noted to have pump malfunction, found to have acute infarct L thalamus. At baseline pt resides with spouse in 2 story home with 1 FAVIO. Pt (I) with ADLs and does not use AD for mobility, however pt noted to have L comminuted dx of distal fifth metatarsal shaft, minimally displaced avulsion fx distal fibular tip. May weight bear with boot with ambulating. Pt has cane and RW if needed. [facility] Pt c/o 5/10 pain L ankle, however agreeable to mobilize with OT/PT. Pt completes bed mobility with supervision, able to don weight bearing boot with set up sitting edge of bed and comes to standing and ambulates with CGA-Julia x2. Pt may benefit from use of RW at dc to increase balance and HHOT to increase (I) with ADLs/ increase activity tolerance. OT will follow while admitted. Anticipate safe to dc home with spouse assist.     Time Calculation:                   Neda Riggs OT  4/21/2024

## 2024-04-21 NOTE — PROGRESS NOTES
[facility]     Progress Note    Patient Name: Karly Zayas  : 1970  MRN: [id number]  Primary Care Physician:  Sandra Matias APRN  Date of admission: 2024  Service date and time: 24 15:02 EDT  Subjective   Subjective     Chief Complaint: elevated BS    HPI:  Chastity patient seen and examined at bedside, no acute overnight event.  She is laying comfortably in bed with family present.  Family was able to bring her insulin pump and and she has not connected now.  She had a hemodynamically stable.      Objective   Objective     Vitals:   Temp:  [98.4 °F (36.9 °C)-98.7 °F (37.1 °C)] 98.7 °F (37.1 °C)  Heart Rate:  [83-96] 83  Resp:  [16-21] 16  BP: (155-174)/(76-82) 174/77  Physical Exam    Constitutional: Awake, alert   Eyes: PERRLA, sclerae anicteric, no conjunctival injection   HENT: NCAT, mucous membranes moist   Neck: Supple, no thyromegaly, no lymphadenopathy, trachea midline   Respiratory: Clear to auscultation bilaterally, nonlabored respirations    Cardiovascular: RRR, no murmurs, rubs, or gallops, palpable pedal pulses bilaterally   Gastrointestinal: Positive bowel sounds, soft, nontender, nondistended   Musculoskeletal: No bilateral ankle edema, no clubbing or cyanosis to extremities   Psychiatric: Appropriate affect, cooperative   Neurologic: Oriented x 3, strength symmetric in all extremities, Cranial Nerves grossly intact to confrontation, speech clear   Skin: No rashes     Result Review    Result Review:  I have personally reviewed the results from the time of this admission to 2024 15:02 EDT and agree with these findings:  [x]  Laboratory list / accordion  [x]  Microbiology  [x]  Radiology  [x]  EKG/Telemetry   [x]  Cardiology/Vascular   []  Pathology  []  Old records  []  Other:        Assessment & Plan   Assessment / Plan       Active Hospital Problems:  Active Hospital Problems    Diagnosis     **DKA, type 1     Acute CVA (cerebrovascular accident)     Hypertension,  uncontrolled     Hyperlipidemia, mixed     Type 1 diabetes mellitus with diabetic neuropathy     Chronic back pain     Depression    Anxiety    Plan:   -She presented with DKA in the settings of kinked insulin pump tubing per family.  She has been off insulin drip for over 24 hours now.  Family was able to bring in her pump which has been connected.  Will monitor overnight and see how her glucose trend while she is on the pump.  -Her endocrinologist is following her inpatient, appreciate recommendations.  -Glucose check ACHS.  - Neuro consulted given tiny stroke identified on imaging, no further workup recommended.  She is to continue aspirin and atorvastatin on discharge.,  -She was seen by PT/OT who recommended discharge home with home PT.  -Continue home antihypertensives, as needed IV hydralazine for systolic blood pressure above 160.        DVT prophylaxis: SCDs  Full code  Continue patient level of care, if her blood glucose remained stable now that she is on insulin pump she can be discharged in the morning.        Dr. Sushant Mccrary

## 2024-04-22 ENCOUNTER — DOCUMENTATION (OUTPATIENT)
Dept: HOME HEALTH SERVICES | Facility: HOME HEALTHCARE | Age: 54
End: 2024-04-22
Payer: COMMERCIAL

## 2024-04-22 ENCOUNTER — TRANSCRIBE ORDERS (OUTPATIENT)
Dept: HOME HEALTH SERVICES | Facility: HOME HEALTHCARE | Age: 54
End: 2024-04-22
Payer: COMMERCIAL

## 2024-04-22 ENCOUNTER — READMISSION MANAGEMENT (OUTPATIENT)
Dept: CALL CENTER | Facility: HOSPITAL | Age: 54
End: 2024-04-22
Payer: COMMERCIAL

## 2024-04-22 ENCOUNTER — HOME HEALTH ADMISSION (OUTPATIENT)
Dept: HOME HEALTH SERVICES | Facility: HOME HEALTHCARE | Age: 54
End: 2024-04-22
Payer: COMMERCIAL

## 2024-04-22 VITALS
DIASTOLIC BLOOD PRESSURE: 69 MMHG | TEMPERATURE: 99 F | HEART RATE: 92 BPM | WEIGHT: 173.28 LBS | OXYGEN SATURATION: 93 % | HEIGHT: 69 IN | SYSTOLIC BLOOD PRESSURE: 134 MMHG | RESPIRATION RATE: 16 BRPM | BODY MASS INDEX: 25.67 KG/M2

## 2024-04-22 DIAGNOSIS — E10.10 DIABETIC KETOACIDOSIS WITHOUT COMA ASSOCIATED WITH TYPE 1 DIABETES MELLITUS: Primary | ICD-10-CM

## 2024-04-22 LAB
ALBUMIN SERPL-MCNC: 3.6 G/DL (ref 3.5–5.2)
ALBUMIN/GLOB SERPL: 1.2 G/DL
ALP SERPL-CCNC: 86 U/L (ref 39–117)
ALT SERPL W P-5'-P-CCNC: 8 U/L (ref 1–33)
ANION GAP SERPL CALCULATED.3IONS-SCNC: 11 MMOL/L (ref 5–15)
AST SERPL-CCNC: 11 U/L (ref 1–32)
BILIRUB SERPL-MCNC: 0.3 MG/DL (ref 0–1.2)
BUN SERPL-MCNC: 8 MG/DL (ref 6–20)
BUN/CREAT SERPL: 10.7 (ref 7–25)
CALCIUM SPEC-SCNC: 8.9 MG/DL (ref 8.6–10.5)
CHLORIDE SERPL-SCNC: 104 MMOL/L (ref 98–107)
CO2 SERPL-SCNC: 24 MMOL/L (ref 22–29)
CREAT SERPL-MCNC: 0.75 MG/DL (ref 0.57–1)
DEPRECATED RDW RBC AUTO: 43.5 FL (ref 37–54)
EGFRCR SERPLBLD CKD-EPI 2021: 95.3 ML/MIN/1.73
ERYTHROCYTE [DISTWIDTH] IN BLOOD BY AUTOMATED COUNT: 14 % (ref 12.3–15.4)
GLOBULIN UR ELPH-MCNC: 3 GM/DL
GLUCOSE BLDC GLUCOMTR-MCNC: 161 MG/DL (ref 70–105)
GLUCOSE BLDC GLUCOMTR-MCNC: 178 MG/DL (ref 70–105)
GLUCOSE BLDC GLUCOMTR-MCNC: 228 MG/DL (ref 70–105)
GLUCOSE BLDC GLUCOMTR-MCNC: 261 MG/DL (ref 70–105)
GLUCOSE BLDC GLUCOMTR-MCNC: 38 MG/DL (ref 70–105)
GLUCOSE BLDC GLUCOMTR-MCNC: 51 MG/DL (ref 70–105)
GLUCOSE SERPL-MCNC: 54 MG/DL (ref 65–99)
HCT VFR BLD AUTO: 36.8 % (ref 34–46.6)
HGB BLD-MCNC: 11.6 G/DL (ref 12–15.9)
LYMPHOCYTES # BLD MANUAL: 6.12 10*3/MM3 (ref 0.7–3.1)
LYMPHOCYTES NFR BLD MANUAL: 5 % (ref 5–12)
MAGNESIUM SERPL-MCNC: 2.1 MG/DL (ref 1.6–2.6)
MCH RBC QN AUTO: 26.9 PG (ref 26.6–33)
MCHC RBC AUTO-ENTMCNC: 31.5 G/DL (ref 31.5–35.7)
MCV RBC AUTO: 85.4 FL (ref 79–97)
MONOCYTES # BLD: 0.51 10*3/MM3 (ref 0.1–0.9)
NEUTROPHILS # BLD AUTO: 3.57 10*3/MM3 (ref 1.7–7)
NEUTROPHILS NFR BLD MANUAL: 35 % (ref 42.7–76)
PHOSPHATE SERPL-MCNC: 2.7 MG/DL (ref 2.5–4.5)
PLATELET # BLD AUTO: 496 10*3/MM3 (ref 140–450)
PMV BLD AUTO: 9.2 FL (ref 6–12)
POTASSIUM SERPL-SCNC: 2.9 MMOL/L (ref 3.5–5.2)
PROT SERPL-MCNC: 6.6 G/DL (ref 6–8.5)
RBC # BLD AUTO: 4.31 10*6/MM3 (ref 3.77–5.28)
RBC MORPH BLD: NORMAL
SCAN SLIDE: NORMAL
SMALL PLATELETS BLD QL SMEAR: ADEQUATE
SODIUM SERPL-SCNC: 139 MMOL/L (ref 136–145)
VARIANT LYMPHS NFR BLD MANUAL: 60 % (ref 19.6–45.3)
WBC MORPH BLD: NORMAL
WBC NRBC COR # BLD AUTO: 10.2 10*3/MM3 (ref 3.4–10.8)

## 2024-04-22 PROCEDURE — 82948 REAGENT STRIP/BLOOD GLUCOSE: CPT

## 2024-04-22 PROCEDURE — 82948 REAGENT STRIP/BLOOD GLUCOSE: CPT | Performed by: INTERNAL MEDICINE

## 2024-04-22 PROCEDURE — 85025 COMPLETE CBC W/AUTO DIFF WBC: CPT | Performed by: NURSE PRACTITIONER

## 2024-04-22 PROCEDURE — 84100 ASSAY OF PHOSPHORUS: CPT | Performed by: INTERNAL MEDICINE

## 2024-04-22 PROCEDURE — 25010000002 HEPARIN (PORCINE) PER 1000 UNITS: Performed by: NURSE PRACTITIONER

## 2024-04-22 PROCEDURE — 85007 BL SMEAR W/DIFF WBC COUNT: CPT | Performed by: NURSE PRACTITIONER

## 2024-04-22 PROCEDURE — 80053 COMPREHEN METABOLIC PANEL: CPT | Performed by: NURSE PRACTITIONER

## 2024-04-22 PROCEDURE — 83735 ASSAY OF MAGNESIUM: CPT | Performed by: NURSE PRACTITIONER

## 2024-04-22 RX ORDER — ATORVASTATIN CALCIUM 80 MG/1
80 TABLET, FILM COATED ORAL NIGHTLY
Qty: 30 TABLET | Refills: 0 | Status: SHIPPED | OUTPATIENT
Start: 2024-04-22

## 2024-04-22 RX ORDER — ASPIRIN 325 MG
325 TABLET ORAL DAILY
Qty: 30 TABLET | Refills: 0 | Status: SHIPPED | OUTPATIENT
Start: 2024-04-23

## 2024-04-22 RX ORDER — POTASSIUM CHLORIDE 20 MEQ/1
40 TABLET, EXTENDED RELEASE ORAL EVERY 4 HOURS
Status: DISCONTINUED | OUTPATIENT
Start: 2024-04-22 | End: 2024-04-22 | Stop reason: HOSPADM

## 2024-04-22 RX ADMIN — DEXTROSE AND SODIUM CHLORIDE 75 ML/HR: 5; 450 INJECTION, SOLUTION INTRAVENOUS at 04:13

## 2024-04-22 RX ADMIN — OXCARBAZEPINE 300 MG: 600 TABLET, FILM COATED ORAL at 08:05

## 2024-04-22 RX ADMIN — Medication 10 ML: at 08:07

## 2024-04-22 RX ADMIN — HEPARIN SODIUM 5000 UNITS: 5000 INJECTION INTRAVENOUS; SUBCUTANEOUS at 06:32

## 2024-04-22 RX ADMIN — ASPIRIN 325 MG ORAL TABLET 325 MG: 325 PILL ORAL at 08:05

## 2024-04-22 RX ADMIN — LISINOPRIL 10 MG: 5 TABLET ORAL at 08:05

## 2024-04-22 RX ADMIN — CITALOPRAM HYDROBROMIDE 20 MG: 20 TABLET ORAL at 08:05

## 2024-04-22 RX ADMIN — GABAPENTIN 600 MG: 300 CAPSULE ORAL at 06:32

## 2024-04-22 RX ADMIN — FAMOTIDINE 20 MG: 10 INJECTION INTRAVENOUS at 08:05

## 2024-04-22 RX ADMIN — DEXTROSE MONOHYDRATE 25 G: 25 INJECTION, SOLUTION INTRAVENOUS at 01:19

## 2024-04-22 RX ADMIN — POTASSIUM CHLORIDE 40 MEQ: 1500 TABLET, EXTENDED RELEASE ORAL at 08:05

## 2024-04-22 NOTE — CONSULTS
Diabetes Education    Patient Name:  Karly Zayas  YOB: 1970  MRN: [id number]  Admit Date:  4/18/2024        MD consult for insulin pump. Patient was admitted in DKA. Patient stated that the pump alarms were going off at home due to a knot in the tubing. Patient was not feeling good, disconnected the pump and set it on the headboard.  tried to get patient reconnected but was unable and patient ended up being brought to the hospital. Patient stated she has tried to teach her  about the pump without success. Insulin pump settings being changed to original settings.   Insulin Pump Contract completed and in chart: Yes  Insulin Pump Log at bedside:  Yes  LDA started: Yes  Patient Supplied Insulin Pump (orders initiated): Yes  Educator Consult entered: Yes    Pump Brand/Model: Tandem T-slim  CGMS Brand/Model: Dexcom    Type of Insulin Used: Humalog    Basal Rate: (units/hour) 12am - 0.8 U/H                                                   3am - 0.6 U/H                                                9:30am - 0.8 U/H    Bolus Rate: (insulin:carbohydrate ratio) 1:15    Last Bolus Given: 4 units this morning                                                                 Insulin Sensitivity Factor/Correction Dose: 1:70     Blood Glucose Target:  110    Active Insulin:      Date of Last Site Change: 4/21/2024    Location of Catheter: right lower abdomen    Site Assessment: clean, dry, and intact    Educator assessed:       pump supplies at bedside: no       Insulin Expiration Date:   Patient being discharged home.             Electronically signed by:  Fauzia Matias RN  04/22/24 10:04 EDT

## 2024-04-22 NOTE — PAYOR COMM NOTE
"This is discharge notification for Karly Zayas   Reference/Auth # [id number]   Pt discharged routine to home on 24.    HOMERO Le, RN, CCM  Utilization Review Nurse  [facility]  Direct & confidential phone # [phone]  Fax # [phone]      Karly Zayas (53 y.o. Female)       Date of Birth   1970    Social Security Number       Address   [address] [place] [address] [address] [place] [address]    Home Phone   [phone]    MRN   [id number]       Confucianist   None    Marital Status                               Admission Date   24    Admission Type   Emergency    Admitting Provider   Lorne Al MD    Attending Provider       Department, Room/Bed   [facility] NEURO, /       Discharge Date   2024    Discharge Disposition   Home or Self Care    Discharge Destination                                 Attending Provider: (none)   Allergies: Prednisone, Nitrofurantoin Monohyd Macro    Isolation: None   Infection: None   Code Status: CPR    Ht: 175.3 cm (69.02\")   Wt: 78.6 kg (173 lb 4.5 oz)    Admission Cmt: None   Principal Problem: DKA, type 1 [E10.10]                   Active Insurance as of 2024       Primary Coverage       Payor Plan Insurance Group Employer/Plan Group    [facility] [id number] [facility] P[facility] [id number]       Payor Plan Address Payor Plan Phone Number Payor Plan Fax Number Effective Dates    [address] [phone]  3/17/2023 - None Entered    [place] [id number]         Subscriber Name Subscriber Birth Date Member ID       JOSE LUIS ZAYAS 4/3/1963 [id number]                     Emergency Contacts        (Rel.) Home Phone Work Phone Mobile Phone    Jose Luis Zayas (Spouse) [phone] -- --                 Discharge Summary        Satya Souza MD at 24 [address] [place] [address]      : 1970  MRN: [id number]  Primary Care Physician:  Sandra Matias, APRN  Date of " admission: 4/18/2024  Service date and time: 04/21/24 15:02 EDT     Subjective  Subjective      Chief Complaint: elevated BS     HPI:  Chastity patient seen and examined at bedside, no acute overnight event.  She is laying comfortably in bed with family present.  Family was able to bring her insulin pump and and she has not connected now.  She had a hemodynamically stable.      4/22  Patient seen with RN at bedside  Awaiting discharge planning  Patient on insulin pump which would be resumed  Her DKA is resolved  Patient had DKA due to kinked insulin pump tubing and being off insulin for 24 hours  The pump has been reconnected  Seen per endocrine and insulin has been adjusted  Patient seen per neuro patient had tiny stroke on imaging  She will be continued on aspirin and statin on discharge  Time for discharge 35 minutes        Objective  Objective      Vitals:   Temp:  [98.4 °F (36.9 °C)-98.7 °F (37.1 °C)] 98.7 °F (37.1 °C)  Heart Rate:  [83-96] 83  Resp:  [16-21] 16  BP: (155-174)/(76-82) 174/77  Physical Exam                         Constitutional: Awake, alert              Eyes: PERRLA, sclerae anicteric, no conjunctival injection              HENT: NCAT, mucous membranes moist              Neck: Supple, no thyromegaly, no lymphadenopathy, trachea midline              Respiratory: Clear to auscultation bilaterally, nonlabored respirations               Cardiovascular: RRR, no murmurs, rubs, or gallops, palpable pedal pulses bilaterally              Gastrointestinal: Positive bowel sounds, soft, nontender, nondistended              Musculoskeletal: No bilateral ankle edema, no clubbing or cyanosis to extremities              Psychiatric: Appropriate affect, cooperative              Neurologic: Oriented x 3, strength symmetric in all extremities, Cranial Nerves grossly intact to confrontation, speech clear              Skin: No rashes            Result Review  Result Review:  I have personally reviewed the results from  the time of this admission to 4/21/2024 15:02 EDT and agree with these findings:  [x]  Laboratory list / accordion  [x]  Microbiology  [x]  Radiology  [x]  EKG/Telemetry   [x]  Cardiology/Vascular   []  Pathology  []  Old records  []  Other:                 Assessment & Plan  Assessment / Plan         Active Hospital Problems:       Active Hospital Problems     Diagnosis      **DKA, type 1      Acute CVA (cerebrovascular accident)      Hypertension, uncontrolled      Hyperlipidemia, mixed      Type 1 diabetes mellitus with diabetic neuropathy      Chronic back pain      Depression     Anxiety     Plan:   -She presented with DKA in the settings of kinked insulin pump tubing per family.  She has been off insulin drip for over 24 hours now.  Family was able to bring in her pump which has been connected.  Will monitor overnight and see how her glucose trend while she is on the pump.  -Her endocrinologist is following her inpatient, appreciate recommendations.  -Glucose check ACHS.  - Neuro consulted given tiny stroke identified on imaging, no further workup recommended.  She is to continue aspirin and atorvastatin on discharge.,  -She was seen by PT/OT who recommended discharge home with home PT.  -Continue home antihypertensives, as needed IV hydralazine for systolic blood pressure above 160.           DVT prophylaxis: SCDs  Full code  Continue patient level of care, if her blood glucose remained stable now that she is on i    Electronically signed by Satya Souza MD at 04/22/24 2953

## 2024-04-22 NOTE — DISCHARGE PLACEMENT REQUEST
"aKrly Zayas (53 y.o. Female)       Date of Birth   1970    Social Security Number       Address   [address] [place] [address] [address] [place] [address]    Home Phone   [phone]    MRN   [id number]       Mandaeism   None    Marital Status                               Admission Date   4/18/24    Admission Type   Emergency    Admitting Provider   Lorne Al MD    Attending Provider   Satya Souza MD    Department, Room/Bed   [facility], 246/1       Discharge Date       Discharge Disposition   Home or Self Care    Discharge Destination                                 Attending Provider: Satya Souza MD    Allergies: Prednisone, Nitrofurantoin Monohyd Macro    Isolation: None   Infection: None   Code Status: CPR    Ht: 175.3 cm (69.02\")   Wt: 78.6 kg (173 lb 4.5 oz)    Admission Cmt: None   Principal Problem: DKA, type 1 [E10.10]                   Active Insurance as of 4/18/2024       Primary Coverage       Payor Plan Insurance Group Employer/Plan Group    [facility] [id number]       Payor Plan Address Payor Plan Phone Number Payor Plan Fax Number Effective Dates    [address] [phone]  3/17/2023 - None Entered    [facility] [id number]         Subscriber Name Subscriber Birth Date Member ID       JOSE LUIS ZAYAS 4/3/1963 [id number]                     Emergency Contacts        (Rel.) Home Phone Work Phone Mobile Phone    MarquezJose Luis (Spouse) [phone] -- --                "

## 2024-04-22 NOTE — PLAN OF CARE
Problem: Skin Injury Risk Increased  Goal: Skin Health and Integrity  Outcome: Ongoing, Progressing  Intervention: Optimize Skin Protection  Recent Flowsheet Documentation  Taken 4/22/2024 0805 by Melody Lopez LPN  Pressure Reduction Techniques:   frequent weight shift encouraged   weight shift assistance provided  Pressure Reduction Devices:   positioning supports utilized   pressure-redistributing mattress utilized  Skin Protection:   adhesive use limited   tubing/devices free from skin contact     Problem: Adult Inpatient Plan of Care  Goal: Plan of Care Review  Outcome: Ongoing, Progressing  Flowsheets (Taken 4/22/2024 1011)  Progress: improving  Plan of Care Reviewed With: patient  Goal: Patient-Specific Goal (Individualized)  Outcome: Ongoing, Progressing  Goal: Absence of Hospital-Acquired Illness or Injury  Outcome: Ongoing, Progressing  Intervention: Identify and Manage Fall Risk  Recent Flowsheet Documentation  Taken 4/22/2024 0805 by [place], Melody A, LPN  Safety Promotion/Fall Prevention:   activity supervised   assistive device/personal items within reach   clutter free environment maintained   fall prevention program maintained   nonskid shoes/slippers when out of bed   room organization consistent   safety round/check completed  Intervention: Prevent Skin Injury  Recent Flowsheet Documentation  Taken 4/22/2024 0805 by Melody Lopez LPN  Skin Protection:   adhesive use limited   tubing/devices free from skin contact  Intervention: Prevent and Manage VTE (Venous Thromboembolism) Risk  Recent Flowsheet Documentation  Taken 4/22/2024 0805 by Melody Lopez LPN  VTE Prevention/Management:   bilateral   sequential compression devices off   patient refused intervention  Range of Motion: active ROM (range of motion) encouraged  Intervention: Prevent Infection  Recent Flowsheet Documentation  Taken 4/22/2024 0805 by Melody Lopez LPN  Infection Prevention: hand hygiene  promoted  Goal: Optimal Comfort and Wellbeing  Outcome: Ongoing, Progressing  Intervention: Provide Person-Centered Care  Recent Flowsheet Documentation  Taken 4/22/2024 0805 by Melody Lopez LPN  Trust Relationship/Rapport:   care explained   thoughts/feelings acknowledged  Goal: Readiness for Transition of Care  Outcome: Ongoing, Progressing     Problem: Fall Injury Risk  Goal: Absence of Fall and Fall-Related Injury  Outcome: Ongoing, Progressing  Intervention: Identify and Manage Contributors  Recent Flowsheet Documentation  Taken 4/22/2024 0805 by Melody Lopez LPN  Medication Review/Management: medications reviewed  Intervention: Promote Injury-Free Environment  Recent Flowsheet Documentation  Taken 4/22/2024 0805 by [place], Melody A, LPN  Safety Promotion/Fall Prevention:   activity supervised   assistive device/personal items within reach   clutter free environment maintained   fall prevention program maintained   nonskid shoes/slippers when out of bed   room organization consistent   safety round/check completed     Problem: Diabetes Comorbidity  Goal: Blood Glucose Level Within Targeted Range  Outcome: Ongoing, Progressing     Problem: Adjustment to Illness (Stroke, Ischemic/Transient Ischemic Attack)  Goal: Optimal Coping  Outcome: Ongoing, Progressing  Intervention: Support Psychosocial Response to Stroke  Recent Flowsheet Documentation  Taken 4/22/2024 0805 by Melody Lopez LPN  Supportive Measures: active listening utilized  Family/Support System Care: self-care encouraged     Problem: Bowel Elimination Impaired (Stroke, Ischemic/Transient Ischemic Attack)  Goal: Effective Bowel Elimination  Outcome: Ongoing, Progressing     Problem: Cerebral Tissue Perfusion (Stroke, Ischemic/Transient Ischemic Attack)  Goal: Optimal Cerebral Tissue Perfusion  Outcome: Ongoing, Progressing  Intervention: Protect and Optimize Cerebral Perfusion  Recent Flowsheet Documentation  Taken 4/22/2024 0805  by Melody Lopez LPN  Sensory Stimulation Regulation: care clustered     Problem: Cognitive Impairment (Stroke, Ischemic/Transient Ischemic Attack)  Goal: Optimal Cognitive Function  Outcome: Ongoing, Progressing  Intervention: Optimize Cognitive Function  Recent Flowsheet Documentation  Taken 4/22/2024 0805 by Melody Lopez LPN  Sensory Stimulation Regulation: care clustered  Reorientation Measures: clock in view     Problem: Communication Impairment (Stroke, Ischemic/Transient Ischemic Attack)  Goal: Improved Communication Skills  Outcome: Ongoing, Progressing  Intervention: Optimize Communication Skills  Recent Flowsheet Documentation  Taken 4/22/2024 0805 by Melody Lopez LPN  Communication Enhancement Strategies: call light answered in person     Problem: Functional Ability Impaired (Stroke, Ischemic/Transient Ischemic Attack)  Goal: Optimal Functional Ability  Outcome: Ongoing, Progressing     Problem: Respiratory Compromise (Stroke, Ischemic/Transient Ischemic Attack)  Goal: Effective Oxygenation and Ventilation  Outcome: Ongoing, Progressing     Problem: Sensorimotor Impairment (Stroke, Ischemic/Transient Ischemic Attack)  Goal: Improved Sensorimotor Function  Outcome: Ongoing, Progressing  Intervention: Optimize Range of Motion, Motor Control and Function  Recent Flowsheet Documentation  Taken 4/22/2024 0805 by Melody Lopez LPN  Range of Motion: active ROM (range of motion) encouraged  Intervention: Optimize Sensory and Perceptual Ability  Recent Flowsheet Documentation  Taken 4/22/2024 0805 by Melody Lopez LPN  Pressure Reduction Techniques:   frequent weight shift encouraged   weight shift assistance provided  Pressure Reduction Devices:   positioning supports utilized   pressure-redistributing mattress utilized     Problem: Swallowing Impairment (Stroke, Ischemic/Transient Ischemic Attack)  Goal: Optimal Eating and Swallowing without Aspiration  Outcome: Ongoing,  Progressing     Problem: Urinary Elimination Impaired (Stroke, Ischemic/Transient Ischemic Attack)  Goal: Effective Urinary Elimination  Outcome: Ongoing, Progressing   Goal Outcome Evaluation:  Plan of Care Reviewed With: patient        Progress: improving

## 2024-04-22 NOTE — DISCHARGE SUMMARY
[facility]      : 1970  MRN: [id number]  Primary Care Physician:  Sandra Matias APRN  Date of admission: 2024  Service date and time: 24 15:02 EDT     Subjective  Subjective      Chief Complaint: elevated BS     HPI:  Chastity patient seen and examined at bedside, no acute overnight event.  She is laying comfortably in bed with family present.  Family was able to bring her insulin pump and and she has not connected now.  She had a hemodynamically stable.        Patient seen with RN at bedside  Awaiting discharge planning  Patient on insulin pump which would be resumed  Her DKA is resolved  Patient had DKA due to kinked insulin pump tubing and being off insulin for 24 hours  The pump has been reconnected  Seen per endocrine and insulin has been adjusted  Patient seen per neuro patient had tiny stroke on imaging  She will be continued on aspirin and statin on discharge  Time for discharge 35 minutes        Objective  Objective      Vitals:   Temp:  [98.4 °F (36.9 °C)-98.7 °F (37.1 °C)] 98.7 °F (37.1 °C)  Heart Rate:  [83-96] 83  Resp:  [16-21] 16  BP: (155-174)/(76-82) 174/77  Physical Exam                         Constitutional: Awake, alert              Eyes: PERRLA, sclerae anicteric, no conjunctival injection              HENT: NCAT, mucous membranes moist              Neck: Supple, no thyromegaly, no lymphadenopathy, trachea midline              Respiratory: Clear to auscultation bilaterally, nonlabored respirations               Cardiovascular: RRR, no murmurs, rubs, or gallops, palpable pedal pulses bilaterally              Gastrointestinal: Positive bowel sounds, soft, nontender, nondistended              Musculoskeletal: No bilateral ankle edema, no clubbing or cyanosis to extremities              Psychiatric: Appropriate affect, cooperative              Neurologic: Oriented x 3, strength symmetric in all extremities, Cranial Nerves grossly intact to confrontation, speech clear               Skin: No rashes            Result Review  Result Review:  I have personally reviewed the results from the time of this admission to 4/21/2024 15:02 EDT and agree with these findings:  [x]  Laboratory list / accordion  [x]  Microbiology  [x]  Radiology  [x]  EKG/Telemetry   [x]  Cardiology/Vascular   []  Pathology  []  Old records  []  Other:                 Assessment & Plan  Assessment / Plan         Active Hospital Problems:       Active Hospital Problems     Diagnosis      **DKA, type 1      Acute CVA (cerebrovascular accident)      Hypertension, uncontrolled      Hyperlipidemia, mixed      Type 1 diabetes mellitus with diabetic neuropathy      Chronic back pain      Depression     Anxiety     Plan:   -She presented with DKA in the settings of kinked insulin pump tubing per family.  She has been off insulin drip for over 24 hours now.  Family was able to bring in her pump which has been connected.  Will monitor overnight and see how her glucose trend while she is on the pump.  -Her endocrinologist is following her inpatient, appreciate recommendations.  -Glucose check ACHS.  - Neuro consulted given tiny stroke identified on imaging, no further workup recommended.  She is to continue aspirin and atorvastatin on discharge.,  -She was seen by PT/OT who recommended discharge home with home PT.  -Continue home antihypertensives, as needed IV hydralazine for systolic blood pressure above 160.           DVT prophylaxis: SCDs  Full code  Continue patient level of care, if her blood glucose remained stable now that she is on i

## 2024-04-22 NOTE — PROGRESS NOTES
Demographics verified. Pt is agreeable to services and has no other agency    Nursing    Sandra Matias NP will be the following provider    Pharmacy:[facility]

## 2024-04-22 NOTE — PLAN OF CARE
Fair shift spent, medicated as ordered, blood sugar monitored and treated accordingly, fluid regime maintained, left stable, care and observation continues    Goal Outcome Evaluation:    Problem: Adult Inpatient Plan of Care  Goal: Absence of Hospital-Acquired Illness or Injury  Outcome: Ongoing, Progressing  Intervention: Identify and Manage Fall Risk  Recent Flowsheet Documentation  Taken 4/22/2024 0400 by Cheng Cha RN  Safety Promotion/Fall Prevention:   assistive device/personal items within reach   clutter free environment maintained   fall prevention program maintained   nonskid shoes/slippers when out of bed   safety round/check completed   room organization consistent  Taken 4/22/2024 0200 by Cheng Cha RN  Safety Promotion/Fall Prevention:   assistive device/personal items within reach   clutter free environment maintained   fall prevention program maintained   nonskid shoes/slippers when out of bed   room organization consistent   safety round/check completed  Taken 4/22/2024 0000 by Cheng Cha RN  Safety Promotion/Fall Prevention:   assistive device/personal items within reach   clutter free environment maintained   fall prevention program maintained   nonskid shoes/slippers when out of bed   room organization consistent   safety round/check completed  Taken 4/21/2024 2200 by Cheng Cha RN  Safety Promotion/Fall Prevention:   assistive device/personal items within reach   clutter free environment maintained   fall prevention program maintained   nonskid shoes/slippers when out of bed   room organization consistent   safety round/check completed  Taken 4/21/2024 2000 by Cheng Cha RN  Safety Promotion/Fall Prevention:   assistive device/personal items within reach   clutter free environment maintained   fall prevention program maintained   nonskid shoes/slippers when out of bed   room organization consistent   safety round/check completed  Intervention: Prevent Skin  Injury  Recent Flowsheet Documentation  Taken 4/22/2024 0400 by Cheng Cha RN  Body Position:   position changed independently   weight shifting  Taken 4/22/2024 0000 by Cheng Cha RN  Body Position:   weight shifting   position changed independently   sitting up in bed  Taken 4/21/2024 2000 by Cheng Cha RN  Body Position:   position changed independently   weight shifting   sitting up in bed  Skin Protection: adhesive use limited  Intervention: Prevent and Manage VTE (Venous Thromboembolism) Risk  Recent Flowsheet Documentation  Taken 4/21/2024 2000 by Cheng Cha RN  Activity Management: activity encouraged  VTE Prevention/Management:   sequential compression devices off   patient refused intervention  Range of Motion: ROM (range of motion) performed  Intervention: Prevent Infection  Recent Flowsheet Documentation  Taken 4/22/2024 0400 by Cheng Cha RN  Infection Prevention:   hand hygiene promoted   rest/sleep promoted   single patient room provided  Taken 4/22/2024 0200 by Cheng Cha RN  Infection Prevention:   hand hygiene promoted   rest/sleep promoted   single patient room provided  Taken 4/22/2024 0000 by Cheng Cha RN  Infection Prevention:   hand hygiene promoted   rest/sleep promoted   single patient room provided  Taken 4/21/2024 2200 by Cheng Cha RN  Infection Prevention:   hand hygiene promoted   rest/sleep promoted   single patient room provided  Taken 4/21/2024 2000 by Cheng Cha RN  Infection Prevention:   hand hygiene promoted   rest/sleep promoted   single patient room provided  Goal: Optimal Comfort and Wellbeing  Outcome: Ongoing, Progressing  Intervention: Provide Person-Centered Care  Recent Flowsheet Documentation  Taken 4/21/2024 2000 by Cheng Cha RN  Trust Relationship/Rapport:   care explained   choices provided   thoughts/feelings acknowledged  Goal: Readiness for Transition of Care  Outcome: Ongoing,  Progressing  Intervention: Mutually Develop Transition Plan  Recent Flowsheet Documentation  Taken 4/22/2024 0100 by Cheng Cha, RN  Equipment Needed After Discharge: none  Equipment Currently Used at Home: none  Anticipated Changes Related to Illness: none  Transportation Anticipated: family or friend will provide  Transportation Concerns: none  Current Discharge Risk: (low BS) other (see comments)  Concerns to be Addressed: discharge planning  Readmission Within the Last 30 Days: no previous admission in last 30 days  Patient/Family Anticipated Services at Transition: none  Patient/Family Anticipates Transition to: home with family     Problem: Diabetes Comorbidity  Goal: Blood Glucose Level Within Targeted Range  Outcome: Ongoing, Progressing  Intervention: Monitor and Manage Glycemia  Recent Flowsheet Documentation  Taken 4/21/2024 2000 by Cheng Cha, RN  Glycemic Management: blood glucose monitored

## 2024-04-23 ENCOUNTER — TRANSCRIBE ORDERS (OUTPATIENT)
Dept: HOME HEALTH SERVICES | Facility: HOME HEALTHCARE | Age: 54
End: 2024-04-23
Payer: COMMERCIAL

## 2024-04-23 DIAGNOSIS — E10.10 TYPE 1 DIABETES MELLITUS WITH KETOACIDOSIS WITHOUT COMA: Primary | ICD-10-CM

## 2024-04-23 DIAGNOSIS — E10.65 TYPE 1 DIABETES MELLITUS WITH HYPERGLYCEMIA: Primary | ICD-10-CM

## 2024-04-23 LAB
BACTERIA SPEC AEROBE CULT: NORMAL
BACTERIA SPEC AEROBE CULT: NORMAL

## 2024-04-23 RX ORDER — PROCHLORPERAZINE 25 MG/1
1 SUPPOSITORY RECTAL CONTINUOUS
Qty: 1 EACH | Refills: 3 | Status: SHIPPED | OUTPATIENT
Start: 2024-04-23

## 2024-04-23 RX ORDER — PROCHLORPERAZINE 25 MG/1
SUPPOSITORY RECTAL
Qty: 9 EACH | Refills: 3 | Status: SHIPPED | OUTPATIENT
Start: 2024-04-23

## 2024-04-23 RX ORDER — INSULIN LISPRO 100 [IU]/ML
INJECTION, SOLUTION INTRAVENOUS; SUBCUTANEOUS
Qty: 90 ML | Refills: 3 | Status: SHIPPED | OUTPATIENT
Start: 2024-04-23

## 2024-04-23 NOTE — OUTREACH NOTE
Prep Survey      Flowsheet Row Responses   [facility] facility patient discharged from? [facility]   Is LACE score < 7 ? No   Eligibility Readm Mgmt   Discharge diagnosis DKA, type 1   Does the patient have one of the following disease processes/diagnoses(primary or secondary)? Other   Does the patient have Home health ordered? Yes   What is the Home health agency?  [facility]   Is there a DME ordered? No   Prep survey completed? Yes            Berkley RODRIGUEZ - Registered Nurse

## 2024-04-23 NOTE — CASE MANAGEMENT/SOCIAL WORK
Case Management Discharge Note      Final Note: Home with [facility]         Selected Continued Care - Discharged on 4/22/2024 Admission date: 4/18/2024 - Discharge disposition: Home or Self Care          Home Medical Care Coordination complete.      Service Provider Selected Services Address Phone Fax Patient Preferred     [facility] [address] [facility] [place] [address] [phone] --                 Transportation Services  Private: Car    Final Discharge Disposition Code: 06 - home with home health care

## 2024-04-26 DIAGNOSIS — E10.65 TYPE 1 DIABETES MELLITUS WITH HYPERGLYCEMIA: ICD-10-CM

## 2024-04-27 RX ORDER — INSULIN LISPRO 100 [IU]/ML
INJECTION, SOLUTION INTRAVENOUS; SUBCUTANEOUS
Qty: 90 ML | Refills: 3 | Status: SHIPPED | OUTPATIENT
Start: 2024-04-27

## 2024-04-29 ENCOUNTER — HOME CARE VISIT (OUTPATIENT)
Dept: HOME HEALTH SERVICES | Facility: HOME HEALTHCARE | Age: 54
End: 2024-04-29
Payer: COMMERCIAL

## 2024-04-29 ENCOUNTER — READMISSION MANAGEMENT (OUTPATIENT)
Dept: CALL CENTER | Facility: HOSPITAL | Age: 54
End: 2024-04-29
Payer: COMMERCIAL

## 2024-04-29 VITALS
HEART RATE: 89 BPM | BODY MASS INDEX: 17.48 KG/M2 | OXYGEN SATURATION: 97 % | RESPIRATION RATE: 17 BRPM | SYSTOLIC BLOOD PRESSURE: 98 MMHG | HEIGHT: 69 IN | WEIGHT: 118 LBS | DIASTOLIC BLOOD PRESSURE: 56 MMHG | TEMPERATURE: 97.8 F

## 2024-04-29 PROCEDURE — G0299 HHS/HOSPICE OF RN EA 15 MIN: HCPCS

## 2024-04-29 NOTE — HOME HEALTH
"SOC Note:  Home Health ordered for: SN / PT for diabetes / CVA teaching. Patient reports she has been a diabetic for a long time and does not need SN diabetes education. Patient reports she had a pump malfunctionbing kinked that caused her recent high blood sugars and not a lack of knowledge. Patient does agree to PT services to help her strengthen her core. Patient further reports that she has 2 broken bones in her left ankle that are healing and now ready for her to bear weight. PCP was unable to confirm status of left ankle. Patient reports Dr Cruz has seen he for this.    Reason for Hosp/Primary Dx/Co-morbidities:   DKA, type 1, Acute CVA (cerebrovascular accident), Hypertension, uncontrolled, Hyperlipidemia, mixed, Type 1 diabetes mellitus with diabetic neuropathy, Chronic back pain, Depression,   Anxiety    Focus of Care: Type 1 diabetes mellitus with diabetic neuropathy, unspecified     Patient's goal(s):\"To be to go for walks with my  without falling down\"    Current Functional status/mobility/DME: ambulatory with offloading boot left foot    HB status/Living Arrangements: Patient lives in private home with spouse who works nights    Skin Integrity/wound status: skin remains intact    Code Status: Full    Fall Risk/Safety concerns: Patient is a high falls risk related to 2 falls in the past year 1 down a flight of stairs    Educated on Emergency Plan, steps to take prior to going to the ER and when to Call Home Health First:  Reviewed with patient    Medication issues/Concerns: Major potential for drug to drug interactions sent to PCP for review    Additional Problems/Concerns: Patient is refusing the need for SN services so patient will not be admitted to skilled nursing but PT only    SDOH Barriers (i.e. caregiver concerns, social isolation, transportation, food insecurity, environment, income etc.)/Need for MSW: None found    Plan for next visit: SN will make no further visits"

## 2024-04-29 NOTE — OUTREACH NOTE
Medical Week 1 Survey      Flowsheet Row Responses   [facility] patient discharged from? [facility]   Does the patient have one of the following disease processes/diagnoses(primary or secondary)? Other   Week 1 attempt successful? Yes   Call start time 1309   Call end time 1311   Discharge diagnosis DKA, type 1   Is patient permission given to speak with other caregiver? Yes   Person spoke with today (if not patient) and relationship Jose Luis Zayas Spouse   Meds reviewed with patient/caregiver? Yes   Does the patient have all medications ordered at discharge? Yes   Is the patient taking all medications as directed (includes completed medication regime)? Yes   Does the patient have a primary care provider?  Yes   Comments regarding PCP  reports that patient has already seen her primary since discharge.   Has the patient kept scheduled appointments due by today? Yes   What is the Home health agency?  [facility]   Has home health visited the patient within 72 hours of discharge? Yes   Psychosocial issues? No   Did the patient receive a copy of their discharge instructions? Yes   Nursing interventions Reviewed instructions with patient  [spouse]   What is the patient's perception of their health status since discharge? Improving  [ reports patient blood sugar readings have been stable.]   Is the patient/caregiver able to teach back signs and symptoms related to disease process for when to call PCP? Yes   Is the patient/caregiver able to teach back signs and symptoms related to disease process for when to call 911? Yes   Is the patient/caregiver able to teach back the hierarchy of who to call/visit for symptoms/problems? PCP, Specialist, Home health nurse, Urgent Care, ED, 911 Yes   Week 1 call completed? Yes   Would this patient benefit from a Referral to Amb Social Work? No   Is the patient interested in additional calls from an ambulatory ? No   Call end time 1311            LISA KELLY - Registered  Nurse

## 2024-04-30 ENCOUNTER — HOME CARE VISIT (OUTPATIENT)
Dept: HOME HEALTH SERVICES | Facility: HOME HEALTHCARE | Age: 54
End: 2024-04-30
Payer: COMMERCIAL

## 2024-04-30 PROCEDURE — G0151 HHCP-SERV OF PT,EA 15 MIN: HCPCS

## 2024-05-01 VITALS
RESPIRATION RATE: 18 BRPM | SYSTOLIC BLOOD PRESSURE: 110 MMHG | TEMPERATURE: 98.4 F | OXYGEN SATURATION: 99 % | HEART RATE: 76 BPM | DIASTOLIC BLOOD PRESSURE: 72 MMHG

## 2024-05-01 NOTE — HOME HEALTH
"Eval Note    Patient's goal(s): \"To be able to walk safely\"     Services required to achieve goals: PT    Potential Issues for goal attainment: Homebound status since covid 19 epidemic, history of falls and fear of leaving house    Problems identified: Progressive decline in functional skills and tolerance    Describe the Functional status and safety: Multiple attempts coming to stand, unable to ambulate in the middle of pathways and therefore needs min/cga. Habitually ambulates short distances indoor with constant reaching for wall/furniture.     Describe any environmental issues: Patient lives with spouse who works night shift    Any equipment needs: Rollator walker    POC confirmed with patient on 4/30/24    Pertinent information: Patient has been in lt CAM boot x 8 weeks per Dr. Cruz but she missed her appt during hospitalization. History of type 1 diabetes with potential for hypoglycemia    Patient will require PT 2wk3 and 1wk3 for therapeutic/home exercise program, transfer teaching and gait training."

## 2024-05-02 ENCOUNTER — TELEPHONE (OUTPATIENT)
Dept: PODIATRY | Facility: CLINIC | Age: 54
End: 2024-05-02

## 2024-05-02 NOTE — TELEPHONE ENCOUNTER
Caller: Karly Zayas    Relationship to patient: Self    Best call back number: [phone]*    Chief complaint: FOOT FRACTURE FOLLOW UP    Type of visit: FOLLOW UP     Requested date: ASAP     If rescheduling, when is the original appointment: 4/18/24     Additional notes:PT IS CALLING IN TO SCHEDULE AN APPOINTMENT, PT HAD AN APPOINTMENT ON 4/18/24 BUT WAS ADMITTED TO THE HOSPITAL .. PT WOULD LIKE A CALL BACK.. PLEASE ADVISE.

## 2024-05-07 ENCOUNTER — HOME CARE VISIT (OUTPATIENT)
Dept: HOME HEALTH SERVICES | Facility: HOME HEALTHCARE | Age: 54
End: 2024-05-07
Payer: COMMERCIAL

## 2024-05-07 PROCEDURE — G0151 HHCP-SERV OF PT,EA 15 MIN: HCPCS

## 2024-05-08 ENCOUNTER — HOME CARE VISIT (OUTPATIENT)
Dept: HOME HEALTH SERVICES | Facility: HOME HEALTHCARE | Age: 54
End: 2024-05-08
Payer: COMMERCIAL

## 2024-05-08 ENCOUNTER — READMISSION MANAGEMENT (OUTPATIENT)
Dept: CALL CENTER | Facility: HOSPITAL | Age: 54
End: 2024-05-08
Payer: COMMERCIAL

## 2024-05-08 ENCOUNTER — OFFICE VISIT (OUTPATIENT)
Dept: PODIATRY | Facility: CLINIC | Age: 54
End: 2024-05-08
Payer: COMMERCIAL

## 2024-05-08 VITALS — HEIGHT: 69 IN | RESPIRATION RATE: 20 BRPM | BODY MASS INDEX: 17.48 KG/M2 | WEIGHT: 118 LBS

## 2024-05-08 VITALS
HEART RATE: 88 BPM | TEMPERATURE: 98.2 F | SYSTOLIC BLOOD PRESSURE: 120 MMHG | DIASTOLIC BLOOD PRESSURE: 84 MMHG | RESPIRATION RATE: 18 BRPM | OXYGEN SATURATION: 96 %

## 2024-05-08 DIAGNOSIS — E10.40 TYPE 1 DIABETES MELLITUS WITH DIABETIC NEUROPATHY: Primary | ICD-10-CM

## 2024-05-08 DIAGNOSIS — S82.65XS CLOSED NONDISPLACED FRACTURE OF LATERAL MALLEOLUS OF LEFT FIBULA, SEQUELA: ICD-10-CM

## 2024-05-08 DIAGNOSIS — S92.355S CLOSED NONDISPLACED FRACTURE OF FIFTH METATARSAL BONE OF LEFT FOOT, SEQUELA: ICD-10-CM

## 2024-05-08 DIAGNOSIS — L85.3 XEROSIS OF SKIN: ICD-10-CM

## 2024-05-08 DIAGNOSIS — I87.303 CHRONIC VENOUS HYPERTENSION INVOLVING BOTH SIDES: ICD-10-CM

## 2024-05-14 ENCOUNTER — HOME CARE VISIT (OUTPATIENT)
Dept: HOME HEALTH SERVICES | Facility: HOME HEALTHCARE | Age: 54
End: 2024-05-14
Payer: COMMERCIAL

## 2024-05-14 PROCEDURE — G0151 HHCP-SERV OF PT,EA 15 MIN: HCPCS

## 2024-05-15 VITALS
TEMPERATURE: 98 F | OXYGEN SATURATION: 100 % | SYSTOLIC BLOOD PRESSURE: 144 MMHG | DIASTOLIC BLOOD PRESSURE: 82 MMHG | HEART RATE: 64 BPM | RESPIRATION RATE: 18 BRPM

## 2024-05-15 NOTE — HOME HEALTH
Routine Visit Note:    Skill/education provided: Therapeutic/home exercise program to ble, transfer teaching and gait training    Patient/caregiver response: Remarkable improvement in sit to stand from recliner chair needing distant supervision and good improvement in gait skills ambulating in the middle of indoor pathways without tending toward wall/furniture support.    Plan for next visit: Therapeutic/home exercise program to ble, transfer teaching and gait training    Other pertinent info: Patient acknowledged remarkable improvement in indoor functional skills but expressed fear of ambulating outdoor stating she feels tense and sometime dizzy to be in the open air outdoor. Patient was reassured that she would do well when ready for outdoor ambulation.

## 2024-05-16 ENCOUNTER — HOME CARE VISIT (OUTPATIENT)
Dept: HOME HEALTH SERVICES | Facility: HOME HEALTHCARE | Age: 54
End: 2024-05-16
Payer: COMMERCIAL

## 2024-05-21 ENCOUNTER — HOME CARE VISIT (OUTPATIENT)
Dept: HOME HEALTH SERVICES | Facility: HOME HEALTHCARE | Age: 54
End: 2024-05-21
Payer: COMMERCIAL

## 2024-05-23 ENCOUNTER — HOME CARE VISIT (OUTPATIENT)
Dept: HOME HEALTH SERVICES | Facility: HOME HEALTHCARE | Age: 54
End: 2024-05-23
Payer: COMMERCIAL

## 2024-05-30 ENCOUNTER — HOME CARE VISIT (OUTPATIENT)
Dept: HOME HEALTH SERVICES | Facility: HOME HEALTHCARE | Age: 54
End: 2024-05-30
Payer: COMMERCIAL

## 2024-06-15 ENCOUNTER — HOSPITAL ENCOUNTER (EMERGENCY)
Facility: HOSPITAL | Age: 54
Discharge: HOME | End: 2024-06-15
Attending: EMERGENCY MEDICINE
Payer: COMMERCIAL

## 2024-06-15 ENCOUNTER — APPOINTMENT (OUTPATIENT)
Dept: GENERAL RADIOLOGY | Facility: HOSPITAL | Age: 54
End: 2024-06-15
Payer: COMMERCIAL

## 2024-06-15 VITALS
WEIGHT: 190.04 LBS | DIASTOLIC BLOOD PRESSURE: 65 MMHG | RESPIRATION RATE: 16 BRPM | HEART RATE: 98 BPM | HEIGHT: 69 IN | BODY MASS INDEX: 28.15 KG/M2 | TEMPERATURE: 97.8 F | SYSTOLIC BLOOD PRESSURE: 105 MMHG | OXYGEN SATURATION: 100 %

## 2024-06-15 DIAGNOSIS — S99.911A INJURY OF RIGHT ANKLE, INITIAL ENCOUNTER: Primary | ICD-10-CM

## 2024-06-15 PROCEDURE — 73610 X-RAY EXAM OF ANKLE: CPT

## 2024-06-15 PROCEDURE — 99283 EMERGENCY DEPT VISIT LOW MDM: CPT

## 2024-06-15 PROCEDURE — 99213 OFFICE O/P EST LOW 20 MIN: CPT | Performed by: EMERGENCY MEDICINE

## 2024-06-15 RX ORDER — NALOXONE HYDROCHLORIDE 4 MG/.1ML
SPRAY NASAL
Qty: 2 EACH | Refills: 0 | Status: SHIPPED | OUTPATIENT
Start: 2024-06-15

## 2024-06-15 RX ORDER — HYDROCODONE BITARTRATE AND ACETAMINOPHEN 7.5; 325 MG/1; MG/1
1 TABLET ORAL ONCE
Status: COMPLETED | OUTPATIENT
Start: 2024-06-15 | End: 2024-06-15

## 2024-06-15 RX ORDER — HYDROCODONE BITARTRATE AND ACETAMINOPHEN 7.5; 325 MG/1; MG/1
1 TABLET ORAL EVERY 6 HOURS PRN
Qty: 10 TABLET | Refills: 0 | Status: SHIPPED | OUTPATIENT
Start: 2024-06-15

## 2024-06-15 RX ADMIN — HYDROCODONE BITARTRATE AND ACETAMINOPHEN 1 TABLET: 7.5; 325 TABLET ORAL at 01:51

## 2024-06-15 NOTE — FSED PROVIDER NOTE
Subjective   History of Present Illness  53-year-old female presents today for left ankle pain.  Patient states that she felt that her leg gave out on her causing her to fall onto her left ankle.  She denies any head trauma.  She states that this is separate frequently since her previous stroke.  She denies any nausea, vomiting, chest pain, shortness of breath.        Review of Systems   Musculoskeletal:         Positive for ankle pain   All other systems reviewed and are negative.      Past Medical History:   Diagnosis Date    Anxiety     Autonomic neuropathy associated with type 1 diabetes mellitus     Bipolar 2 disorder     Cataracts, bilateral     Diabetes mellitus     TYPE 1    Diabetes mellitus type I     Diabetic retinopathy associated with type 1 diabetes mellitus     Disease of thyroid gland     DKA (diabetic ketoacidoses)     Foot fracture, right     High cholesterol     Kidney stone     Macular degeneration, bilateral     Neuropathy     Pelvic pain        Allergies   Allergen Reactions    Nitrofurantoin Monohyd Macro Other (See Comments)     severe reaction- hospitalized    Prednisone Swelling     high fever/ body aches       Past Surgical History:   Procedure Laterality Date    ANKLE SURGERY Right 2015     SECTION      COLONOSCOPY N/A 2023    Procedure: COLONOSCOPY;  Surgeon: Lori Cleary MD;  Location: [facility] ENDOSCOPY;  Service: Gastroenterology;  Laterality: N/A;    DIAGNOSTIC LAPAROSCOPY      DIAGNOSTIC LAPAROSCOPY N/A 2017    Procedure: LAPAROSCOPY, PARTIAL LEFT SALPINGECTOMY;  Surgeon: Arleen Quintana MD;  Location: [facility];  Service:     DILATATION AND CURETTAGE N/A 7/10/2018    Procedure: FROZEN DILATATION AND CURETTAGE POWER MORECELLATOR;  Surgeon: Nicole Shannon MD;  Location: [facility] OR;  Service: Gynecology    HYSTERECTOMY SUPRACERVICAL N/A 7/10/2018    Procedure: LAPAROSCOPIC SUPRACERVICAL HYSTERECTOMY WITH MORCELLATOR, LEFT  SALPINGECTOMY;  Surgeon: Nicole Shannon MD;  Location: [facility] OR;  Service: Gynecology    HYSTEROSCOPY ENDOMETRIAL ABLATION  2009    WITH TUBAL AND RIGHT OVARY REMOVAL    TONSILLECTOMY  1983       Family History   Problem Relation Age of Onset    Malig Hyperthermia Neg Hx        Social History     Socioeconomic History    Marital status:    Tobacco Use    Smoking status: Former     Current packs/day: 0.00     Types: Cigarettes     Start date: 1992     Quit date: 2017     Years since quittin.9     Passive exposure: Past    Smokeless tobacco: Former    Tobacco comments:     Currently using vaping   Vaping Use    Vaping status: Some Days    Substances: Nicotine, Flavoring    Devices: Pre-filled or refillable cartridge   Substance and Sexual Activity    Alcohol use: No    Drug use: No    Sexual activity: Not Currently           Objective   Physical Exam  Vitals reviewed.   Constitutional:       Appearance: Normal appearance.   HENT:      Head: Normocephalic and atraumatic.   Eyes:      Extraocular Movements: Extraocular movements intact.      Pupils: Pupils are equal, round, and reactive to light.   Cardiovascular:      Rate and Rhythm: Normal rate and regular rhythm.   Pulmonary:      Effort: Pulmonary effort is normal.      Breath sounds: Normal breath sounds.   Abdominal:      General: Abdomen is flat.      Palpations: Abdomen is soft.   Musculoskeletal:      Comments: Pedal pulses are intact, tenderness to palpation of the right ankle, greatest at the lateral malleolus, small abrasion to left medial malleolus   Skin:     General: Skin is warm and dry.      Capillary Refill: Capillary refill takes less than 2 seconds.   Neurological:      Mental Status: She is alert and oriented to person, place, and time.         Procedures           ED Course                                           Medical Decision Making  Differential diagnosis includes was not limited to fracture, sprain, dislocation.   Patient previously had surgery on this ankle and is currently in a cam boot this was removed for examination and x-ray.  X-ray with some soft tissue swelling and densities at the distal fibula tiptop to be secondary to old injury cannot exclude acute injury.  Advised the patient follow-up for repeat x-ray.  She was provided a copy of her x-ray report, she is given pain medication here will prescribe pain medication, advised she follow up with her doctor. Advised to continue wearing cam boot until repeat imaging obtained.     Problems Addressed:  Injury of right ankle, initial encounter: complicated acute illness or injury    Amount and/or Complexity of Data Reviewed  Radiology: ordered.     Details: XR ANKLE 3+ VW RIGHT     Date of Exam: 6/15/2024 1:18 AM EDT     Indication: ankle pain sp fall     Comparison: None available.     Findings:  There is lateral malleolar soft tissue swelling. There is an irregular appearance of the distal fibular tip. This could be related to an old injury as there is a well-corticated rounded adjacent osseous density, but a superimposed chip or avulsion   fracture is not excluded. Joint spaces appear intact. There are small dorsal midfoot osteophytes.     IMPRESSION:  Impression:  Lateral malleolar soft tissue swelling and osseous densities at the distal fibular tip favored to reflect sequela of old injury although an acute chip or avulsion fracture of the distal fibular tip is not excluded. Consider short-term radiographic   follow-up if symptoms persist.      Risk  Prescription drug management.        Final diagnoses:   Injury of right ankle, initial encounter       ED Disposition  ED Disposition       ED Disposition   Discharge    Condition   Stable    Comment   --               Sandra Matias, APRN  [address] [address]  [phone]    Call            Medication List        New Prescriptions      HYDROcodone-acetaminophen 7.5-325 MG per  tablet  Commonly known as: NORCO  Take 1 tablet by mouth Every 6 (Six) Hours As Needed for Moderate Pain.  Replaces: HYDROcodone-acetaminophen  MG per tablet     naloxone 4 MG/0.1ML nasal spray  Commonly known as: NARCAN  Call 911. Don't prime. [place] in 1 nostril for overdose. Repeat in 2-3 minutes in other nostril if no or minimal breathing/responsiveness.            Stop      HYDROcodone-acetaminophen  MG per tablet  Commonly known as: NORCO  Replaced by: HYDROcodone-acetaminophen 7.5-325 MG per tablet               Where to Get Your Medications        These medications were sent to [id number] [place] - [address] AT [place] [address] [address] [place] - [phone] [facility] [phone] FX  [address] [place] [address]      Phone: [phone]   HYDROcodone-acetaminophen 7.5-325 MG per tablet  naloxone 4 MG/0.1ML nasal spray

## 2024-06-15 NOTE — DISCHARGE INSTRUCTIONS
XR ANKLE 3+ VW RIGHT     Date of Exam: 6/15/2024 1:18 AM EDT     Indication: ankle pain sp fall     Comparison: None available.     Findings:  There is lateral malleolar soft tissue swelling. There is an irregular appearance of the distal fibular tip. This could be related to an old injury as there is a well-corticated rounded adjacent osseous density, but a superimposed chip or avulsion   fracture is not excluded. Joint spaces appear intact. There are small dorsal midfoot osteophytes.     IMPRESSION:  Impression:  Lateral malleolar soft tissue swelling and osseous densities at the distal fibular tip favored to reflect sequela of old injury although an acute chip or avulsion fracture of the distal fibular tip is not excluded. Consider short-term radiographic   follow-up if symptoms persist.      Please follow up with your doctor for your repeat Xray.

## 2024-07-04 ENCOUNTER — HOSPITAL ENCOUNTER (OUTPATIENT)
Facility: HOSPITAL | Age: 54
Setting detail: OBSERVATION
Discharge: OTHER INSTITUTION | End: 2024-07-04
Attending: EMERGENCY MEDICINE | Admitting: INTERNAL MEDICINE
Payer: COMMERCIAL

## 2024-07-04 ENCOUNTER — APPOINTMENT (OUTPATIENT)
Dept: CT IMAGING | Facility: HOSPITAL | Age: 54
End: 2024-07-04
Payer: COMMERCIAL

## 2024-07-04 ENCOUNTER — APPOINTMENT (OUTPATIENT)
Dept: GENERAL RADIOLOGY | Facility: HOSPITAL | Age: 54
End: 2024-07-04
Payer: COMMERCIAL

## 2024-07-04 VITALS
RESPIRATION RATE: 17 BRPM | BODY MASS INDEX: 28.14 KG/M2 | HEIGHT: 69 IN | OXYGEN SATURATION: 94 % | WEIGHT: 190 LBS | TEMPERATURE: 97.9 F | HEART RATE: 86 BPM | SYSTOLIC BLOOD PRESSURE: 148 MMHG | DIASTOLIC BLOOD PRESSURE: 74 MMHG

## 2024-07-04 DIAGNOSIS — S92.002A CLOSED DISPLACED FRACTURE OF LEFT CALCANEUS, UNSPECIFIED PORTION OF CALCANEUS, INITIAL ENCOUNTER: Primary | ICD-10-CM

## 2024-07-04 LAB
ANION GAP SERPL CALCULATED.3IONS-SCNC: 11.1 MMOL/L (ref 5–15)
APTT PPP: 27 SECONDS (ref 61–76.5)
BASOPHILS # BLD AUTO: 0.1 10*3/MM3 (ref 0–0.2)
BASOPHILS NFR BLD AUTO: 1.3 % (ref 0–1.5)
BUN SERPL-MCNC: 9 MG/DL (ref 6–20)
BUN/CREAT SERPL: 12.5 (ref 7–25)
CALCIUM SPEC-SCNC: 9.1 MG/DL (ref 8.6–10.5)
CHLORIDE SERPL-SCNC: 103 MMOL/L (ref 98–107)
CO2 SERPL-SCNC: 23.9 MMOL/L (ref 22–29)
CREAT SERPL-MCNC: 0.72 MG/DL (ref 0.57–1)
DEPRECATED RDW RBC AUTO: 41.8 FL (ref 37–54)
EGFRCR SERPLBLD CKD-EPI 2021: 100.1 ML/MIN/1.73
EOSINOPHIL # BLD AUTO: 0 10*3/MM3 (ref 0–0.4)
EOSINOPHIL NFR BLD AUTO: 0 % (ref 0.3–6.2)
ERYTHROCYTE [DISTWIDTH] IN BLOOD BY AUTOMATED COUNT: 13 % (ref 12.3–15.4)
GLUCOSE BLDC GLUCOMTR-MCNC: 107 MG/DL (ref 70–105)
GLUCOSE BLDC GLUCOMTR-MCNC: 108 MG/DL (ref 70–105)
GLUCOSE SERPL-MCNC: 132 MG/DL (ref 65–99)
HCT VFR BLD AUTO: 36.5 % (ref 34–46.6)
HGB BLD-MCNC: 11.1 G/DL (ref 12–15.9)
IMM GRANULOCYTES # BLD AUTO: 0.03 10*3/MM3 (ref 0–0.05)
IMM GRANULOCYTES NFR BLD AUTO: 0.4 % (ref 0–0.5)
INR PPP: 0.96 (ref 0.93–1.1)
LYMPHOCYTES # BLD AUTO: 2.89 10*3/MM3 (ref 0.7–3.1)
LYMPHOCYTES NFR BLD AUTO: 36.6 % (ref 19.6–45.3)
MCH RBC QN AUTO: 27.1 PG (ref 26.6–33)
MCHC RBC AUTO-ENTMCNC: 30.4 G/DL (ref 31.5–35.7)
MCV RBC AUTO: 89 FL (ref 79–97)
MONOCYTES # BLD AUTO: 0.42 10*3/MM3 (ref 0.1–0.9)
MONOCYTES NFR BLD AUTO: 5.3 % (ref 5–12)
NEUTROPHILS NFR BLD AUTO: 4.45 10*3/MM3 (ref 1.7–7)
NEUTROPHILS NFR BLD AUTO: 56.4 % (ref 42.7–76)
NRBC BLD AUTO-RTO: 0 /100 WBC (ref 0–0.2)
PLATELET # BLD AUTO: 471 10*3/MM3 (ref 140–450)
PMV BLD AUTO: 9.5 FL (ref 6–12)
POTASSIUM SERPL-SCNC: 4.3 MMOL/L (ref 3.5–5.2)
PROTHROMBIN TIME: 10.5 SECONDS (ref 9.6–11.7)
RBC # BLD AUTO: 4.1 10*6/MM3 (ref 3.77–5.28)
SODIUM SERPL-SCNC: 138 MMOL/L (ref 136–145)
WBC NRBC COR # BLD AUTO: 7.89 10*3/MM3 (ref 3.4–10.8)

## 2024-07-04 PROCEDURE — G0378 HOSPITAL OBSERVATION PER HR: HCPCS

## 2024-07-04 PROCEDURE — 73610 X-RAY EXAM OF ANKLE: CPT

## 2024-07-04 PROCEDURE — 99284 EMERGENCY DEPT VISIT MOD MDM: CPT

## 2024-07-04 PROCEDURE — 25010000002 MORPHINE PER 10 MG: Performed by: EMERGENCY MEDICINE

## 2024-07-04 PROCEDURE — 82948 REAGENT STRIP/BLOOD GLUCOSE: CPT

## 2024-07-04 PROCEDURE — 70450 CT HEAD/BRAIN W/O DYE: CPT

## 2024-07-04 PROCEDURE — 99285 EMERGENCY DEPT VISIT HI MDM: CPT

## 2024-07-04 PROCEDURE — 96375 TX/PRO/DX INJ NEW DRUG ADDON: CPT

## 2024-07-04 PROCEDURE — 73630 X-RAY EXAM OF FOOT: CPT

## 2024-07-04 PROCEDURE — 96376 TX/PRO/DX INJ SAME DRUG ADON: CPT

## 2024-07-04 PROCEDURE — 80048 BASIC METABOLIC PNL TOTAL CA: CPT | Performed by: EMERGENCY MEDICINE

## 2024-07-04 PROCEDURE — 85610 PROTHROMBIN TIME: CPT | Performed by: EMERGENCY MEDICINE

## 2024-07-04 PROCEDURE — 96361 HYDRATE IV INFUSION ADD-ON: CPT

## 2024-07-04 PROCEDURE — 96374 THER/PROPH/DIAG INJ IV PUSH: CPT

## 2024-07-04 PROCEDURE — 25010000002 HYDROMORPHONE 1 MG/ML SOLUTION: Performed by: EMERGENCY MEDICINE

## 2024-07-04 PROCEDURE — 36415 COLL VENOUS BLD VENIPUNCTURE: CPT

## 2024-07-04 PROCEDURE — 85025 COMPLETE CBC W/AUTO DIFF WBC: CPT | Performed by: EMERGENCY MEDICINE

## 2024-07-04 PROCEDURE — 25010000002 ONDANSETRON PER 1 MG: Performed by: EMERGENCY MEDICINE

## 2024-07-04 PROCEDURE — 85730 THROMBOPLASTIN TIME PARTIAL: CPT | Performed by: EMERGENCY MEDICINE

## 2024-07-04 PROCEDURE — 25010000002 MORPHINE PER 10 MG: Performed by: INTERNAL MEDICINE

## 2024-07-04 PROCEDURE — 25010000002 HYDROMORPHONE 1 MG/ML SOLUTION

## 2024-07-04 PROCEDURE — 25810000003 LACTATED RINGERS PER 1000 ML

## 2024-07-04 RX ORDER — ONDANSETRON 2 MG/ML
4 INJECTION INTRAMUSCULAR; INTRAVENOUS ONCE
Status: COMPLETED | OUTPATIENT
Start: 2024-07-04 | End: 2024-07-04

## 2024-07-04 RX ORDER — POLYETHYLENE GLYCOL 3350 17 G/17G
17 POWDER, FOR SOLUTION ORAL DAILY PRN
Status: DISCONTINUED | OUTPATIENT
Start: 2024-07-04 | End: 2024-07-04 | Stop reason: HOSPADM

## 2024-07-04 RX ORDER — IBUPROFEN 600 MG/1
1 TABLET ORAL
Status: DISCONTINUED | OUTPATIENT
Start: 2024-07-04 | End: 2024-07-04 | Stop reason: HOSPADM

## 2024-07-04 RX ORDER — SODIUM CHLORIDE, SODIUM LACTATE, POTASSIUM CHLORIDE, CALCIUM CHLORIDE 600; 310; 30; 20 MG/100ML; MG/100ML; MG/100ML; MG/100ML
75 INJECTION, SOLUTION INTRAVENOUS CONTINUOUS
Status: DISCONTINUED | OUTPATIENT
Start: 2024-07-04 | End: 2024-07-04 | Stop reason: HOSPADM

## 2024-07-04 RX ORDER — NORTRIPTYLINE HYDROCHLORIDE 10 MG/1
10 CAPSULE ORAL NIGHTLY
Status: DISCONTINUED | OUTPATIENT
Start: 2024-07-04 | End: 2024-07-04 | Stop reason: HOSPADM

## 2024-07-04 RX ORDER — TIZANIDINE 4 MG/1
4 TABLET ORAL EVERY 8 HOURS PRN
Status: DISCONTINUED | OUTPATIENT
Start: 2024-07-04 | End: 2024-07-04 | Stop reason: HOSPADM

## 2024-07-04 RX ORDER — SODIUM CHLORIDE 9 MG/ML
40 INJECTION, SOLUTION INTRAVENOUS AS NEEDED
Status: DISCONTINUED | OUTPATIENT
Start: 2024-07-04 | End: 2024-07-04 | Stop reason: HOSPADM

## 2024-07-04 RX ORDER — PANTOPRAZOLE SODIUM 40 MG/1
40 TABLET, DELAYED RELEASE ORAL DAILY
COMMUNITY

## 2024-07-04 RX ORDER — ALBUTEROL SULFATE 90 UG/1
2 AEROSOL, METERED RESPIRATORY (INHALATION) EVERY 4 HOURS PRN
Status: DISCONTINUED | OUTPATIENT
Start: 2024-07-04 | End: 2024-07-04 | Stop reason: HOSPADM

## 2024-07-04 RX ORDER — LEVETIRACETAM 750 MG/1
750 TABLET ORAL 2 TIMES DAILY
COMMUNITY

## 2024-07-04 RX ORDER — OXCARBAZEPINE 150 MG/1
300 TABLET, FILM COATED ORAL 3 TIMES DAILY
Status: DISCONTINUED | OUTPATIENT
Start: 2024-07-04 | End: 2024-07-04 | Stop reason: HOSPADM

## 2024-07-04 RX ORDER — ACETAMINOPHEN 650 MG/1
650 SUPPOSITORY RECTAL EVERY 4 HOURS PRN
Status: DISCONTINUED | OUTPATIENT
Start: 2024-07-04 | End: 2024-07-04 | Stop reason: HOSPADM

## 2024-07-04 RX ORDER — ACETAMINOPHEN 160 MG/5ML
650 SOLUTION ORAL EVERY 4 HOURS PRN
Status: DISCONTINUED | OUTPATIENT
Start: 2024-07-04 | End: 2024-07-04 | Stop reason: HOSPADM

## 2024-07-04 RX ORDER — BISACODYL 10 MG
10 SUPPOSITORY, RECTAL RECTAL DAILY PRN
Status: DISCONTINUED | OUTPATIENT
Start: 2024-07-04 | End: 2024-07-04 | Stop reason: HOSPADM

## 2024-07-04 RX ORDER — PANTOPRAZOLE SODIUM 40 MG/1
40 TABLET, DELAYED RELEASE ORAL DAILY
Status: DISCONTINUED | OUTPATIENT
Start: 2024-07-04 | End: 2024-07-04 | Stop reason: HOSPADM

## 2024-07-04 RX ORDER — HYDROCODONE BITARTRATE AND ACETAMINOPHEN 10; 325 MG/1; MG/1
1 TABLET ORAL EVERY 6 HOURS PRN
COMMUNITY

## 2024-07-04 RX ORDER — ASPIRIN 81 MG/1
81 TABLET ORAL DAILY
COMMUNITY

## 2024-07-04 RX ORDER — SODIUM CHLORIDE 0.9 % (FLUSH) 0.9 %
10 SYRINGE (ML) INJECTION EVERY 12 HOURS SCHEDULED
Status: DISCONTINUED | OUTPATIENT
Start: 2024-07-04 | End: 2024-07-04 | Stop reason: HOSPADM

## 2024-07-04 RX ORDER — BISACODYL 5 MG/1
5 TABLET, DELAYED RELEASE ORAL DAILY PRN
Status: DISCONTINUED | OUTPATIENT
Start: 2024-07-04 | End: 2024-07-04 | Stop reason: HOSPADM

## 2024-07-04 RX ORDER — ACETAMINOPHEN 325 MG/1
650 TABLET ORAL EVERY 4 HOURS PRN
Status: DISCONTINUED | OUTPATIENT
Start: 2024-07-04 | End: 2024-07-04 | Stop reason: HOSPADM

## 2024-07-04 RX ORDER — DEXTROSE MONOHYDRATE 25 G/50ML
25 INJECTION, SOLUTION INTRAVENOUS
Status: DISCONTINUED | OUTPATIENT
Start: 2024-07-04 | End: 2024-07-04 | Stop reason: HOSPADM

## 2024-07-04 RX ORDER — ATORVASTATIN CALCIUM 20 MG/1
20 TABLET, FILM COATED ORAL NIGHTLY
Status: DISCONTINUED | OUTPATIENT
Start: 2024-07-04 | End: 2024-07-04 | Stop reason: HOSPADM

## 2024-07-04 RX ORDER — NICOTINE POLACRILEX 4 MG
15 LOZENGE BUCCAL
Status: DISCONTINUED | OUTPATIENT
Start: 2024-07-04 | End: 2024-07-04 | Stop reason: HOSPADM

## 2024-07-04 RX ORDER — ASPIRIN 81 MG/1
81 TABLET ORAL DAILY
Status: DISCONTINUED | OUTPATIENT
Start: 2024-07-04 | End: 2024-07-04 | Stop reason: HOSPADM

## 2024-07-04 RX ORDER — CITALOPRAM 20 MG/1
20 TABLET ORAL DAILY
Status: DISCONTINUED | OUTPATIENT
Start: 2024-07-04 | End: 2024-07-04 | Stop reason: HOSPADM

## 2024-07-04 RX ORDER — AMOXICILLIN 250 MG
2 CAPSULE ORAL 2 TIMES DAILY PRN
Status: DISCONTINUED | OUTPATIENT
Start: 2024-07-04 | End: 2024-07-04 | Stop reason: HOSPADM

## 2024-07-04 RX ORDER — INSULIN LISPRO 100 [IU]/ML
2-7 INJECTION, SOLUTION INTRAVENOUS; SUBCUTANEOUS EVERY 6 HOURS
Status: DISCONTINUED | OUTPATIENT
Start: 2024-07-04 | End: 2024-07-04

## 2024-07-04 RX ORDER — SODIUM CHLORIDE 0.9 % (FLUSH) 0.9 %
10 SYRINGE (ML) INJECTION AS NEEDED
Status: DISCONTINUED | OUTPATIENT
Start: 2024-07-04 | End: 2024-07-04 | Stop reason: HOSPADM

## 2024-07-04 RX ORDER — GABAPENTIN 300 MG/1
600 CAPSULE ORAL EVERY 8 HOURS SCHEDULED
Status: DISCONTINUED | OUTPATIENT
Start: 2024-07-04 | End: 2024-07-04 | Stop reason: HOSPADM

## 2024-07-04 RX ORDER — ATORVASTATIN CALCIUM 20 MG/1
20 TABLET, FILM COATED ORAL DAILY
COMMUNITY

## 2024-07-04 RX ORDER — LEVETIRACETAM 500 MG/1
750 TABLET ORAL 2 TIMES DAILY
Status: DISCONTINUED | OUTPATIENT
Start: 2024-07-04 | End: 2024-07-04 | Stop reason: HOSPADM

## 2024-07-04 RX ADMIN — MORPHINE SULFATE 4 MG: 4 INJECTION, SOLUTION INTRAMUSCULAR; INTRAVENOUS at 09:29

## 2024-07-04 RX ADMIN — HYDROMORPHONE HYDROCHLORIDE 1 MG: 1 INJECTION, SOLUTION INTRAMUSCULAR; INTRAVENOUS; SUBCUTANEOUS at 11:43

## 2024-07-04 RX ADMIN — HYDROMORPHONE HYDROCHLORIDE 1 MG: 1 INJECTION, SOLUTION INTRAMUSCULAR; INTRAVENOUS; SUBCUTANEOUS at 07:21

## 2024-07-04 RX ADMIN — MORPHINE SULFATE 4 MG: 4 INJECTION, SOLUTION INTRAMUSCULAR; INTRAVENOUS at 05:09

## 2024-07-04 RX ADMIN — SODIUM CHLORIDE, POTASSIUM CHLORIDE, SODIUM LACTATE AND CALCIUM CHLORIDE 75 ML/HR: 600; 310; 30; 20 INJECTION, SOLUTION INTRAVENOUS at 09:21

## 2024-07-04 RX ADMIN — ONDANSETRON 4 MG: 2 INJECTION INTRAMUSCULAR; INTRAVENOUS at 05:09

## 2024-07-04 RX ADMIN — Medication 10 ML: at 09:21

## 2024-07-04 RX ADMIN — HYDROMORPHONE HYDROCHLORIDE 1 MG: 1 INJECTION, SOLUTION INTRAMUSCULAR; INTRAVENOUS; SUBCUTANEOUS at 06:10

## 2024-07-04 RX ADMIN — HYDROMORPHONE HYDROCHLORIDE 1 MG: 1 INJECTION, SOLUTION INTRAMUSCULAR; INTRAVENOUS; SUBCUTANEOUS at 15:12

## 2024-07-04 NOTE — ED PROVIDER NOTES
Subjective   History of Present Illness  53-year-old female that tripped and fell complains of moderate left ankle and heel pain, no other injury      Review of Systems   Musculoskeletal:         As per HPI       Past Medical History:   Diagnosis Date    Anxiety     Autonomic neuropathy associated with type 1 diabetes mellitus     Bipolar 2 disorder     Cataracts, bilateral     Diabetes mellitus     TYPE 1    Diabetes mellitus type I     Diabetic retinopathy associated with type 1 diabetes mellitus     Disease of thyroid gland     DKA (diabetic ketoacidoses)     Foot fracture, right     High cholesterol     Kidney stone     Macular degeneration, bilateral     Neuropathy     Pelvic pain        Allergies   Allergen Reactions    Nitrofurantoin Monohyd Macro Other (See Comments)     severe reaction- hospitalized    Prednisone Swelling     high fever/ body aches       Past Surgical History:   Procedure Laterality Date    ANKLE SURGERY Right 2015     SECTION      COLONOSCOPY N/A 2023    Procedure: COLONOSCOPY;  Surgeon: Lori Cleary MD;  Location: [facility] ENDOSCOPY;  Service: Gastroenterology;  Laterality: N/A;    DIAGNOSTIC LAPAROSCOPY      DIAGNOSTIC LAPAROSCOPY N/A 2017    Procedure: LAPAROSCOPY, PARTIAL LEFT SALPINGECTOMY;  Surgeon: Arleen Quintana MD;  Location: [facility];  Service:     DILATATION AND CURETTAGE N/A 7/10/2018    Procedure: FROZEN DILATATION AND CURETTAGE POWER MORECELLATOR;  Surgeon: Nicole Shannon MD;  Location: [facility] OR;  Service: Gynecology    HYSTERECTOMY SUPRACERVICAL N/A 7/10/2018    Procedure: LAPAROSCOPIC SUPRACERVICAL HYSTERECTOMY WITH MORCELLATOR, LEFT SALPINGECTOMY;  Surgeon: Nicole Shannon MD;  Location: [facility];  Service: Gynecology    HYSTEROSCOPY ENDOMETRIAL ABLATION      WITH TUBAL AND RIGHT OVARY REMOVAL    TONSILLECTOMY         Family History   Problem Relation Age of Onset    Malig Hyperthermia Neg Hx         Social History     Socioeconomic History    Marital status:    Tobacco Use    Smoking status: Former     Current packs/day: 0.00     Types: Cigarettes     Start date: 1992     Quit date: 2017     Years since quittin.0     Passive exposure: Past    Smokeless tobacco: Former    Tobacco comments:     Currently using vaping   Vaping Use    Vaping status: Some Days    Substances: Nicotine, Flavoring    Devices: Pre-filled or refillable cartridge   Substance and Sexual Activity    Alcohol use: No    Drug use: No    Sexual activity: Not Currently           Objective   Physical Exam  Constitutional:       Appearance: Normal appearance.   HENT:      Head: Normocephalic and atraumatic.   Cardiovascular:      Pulses: Normal pulses.   Musculoskeletal:      Comments: Mild-moderate swelling to the left ankle and heel with associated tenderness, distally approximately without any pain or tenderness, neurovascular intact, normal capillary refill   Skin:     General: Skin is warm and dry.      Capillary Refill: Capillary refill takes less than 2 seconds.   Neurological:      General: No focal deficit present.      Mental Status: She is alert and oriented to person, place, and time.   Psychiatric:         Mood and Affect: Mood normal.         Behavior: Behavior normal.         Lower Extremity Dislocation    Date/Time: 2024 6:33 AM    Performed by: Jluis Luna MD  Authorized by: Jluis Luna MD  Consent: Verbal consent obtained.  Patient identity confirmed: verbally with patient  Injury location: foot  Location details: left foot  Injury type: fracture  Fracture type: calcaneal  Pre-procedure distal perfusion: normal  Pre-procedure neurological function: normal  Pre-procedure range of motion: reduced    Anesthesia:  Local anesthesia used: no    Sedation:  Patient sedated: no    Manipulation performed: no  Immobilization: splint  Splint type: short leg  Supplies used: Ortho-Glass  Post-procedure  distal perfusion: normal  Post-procedure neurological function: normal  Post-procedure range of motion: unchanged  Patient tolerance: patient tolerated the procedure well with no immediate complications                 ED Course                                             Medical Decision Making  Patient with persistent left foot pain with calcaneal injury which is unexpected given her mechanism.  Will admit for pain control and orthopedic consultation, case discussed with Dr. Wright with hospital service who agrees with plan.    Problems Addressed:  Closed displaced fracture of left calcaneus, unspecified portion of calcaneus, initial encounter: complicated acute illness or injury    Amount and/or Complexity of Data Reviewed  Labs: ordered.  Radiology: ordered.    Risk  Prescription drug management.  Decision regarding hospitalization.        Final diagnoses:   Closed displaced fracture of left calcaneus, unspecified portion of calcaneus, initial encounter       ED Disposition  ED Disposition       ED Disposition   Decision to Admit    Condition   --    Comment   Level of Care: Med/Surg [1]   Diagnosis: Left calcaneal fracture [[id number]]   Admitting Physician: CHULA WRIGHT [[id number]]                 No follow-up provider specified.       Medication List      No changes were made to your prescriptions during this visit.            Jluis Luna MD  07/04/24 0619

## 2024-07-04 NOTE — PLAN OF CARE
Goal Outcome Evaluation:               EMS coming to pick out up patient. Spouse at bedside.

## 2024-07-04 NOTE — Clinical Note
Physician of Record for Attribution - Please select from Treatment Team: FREDI HAYNES [[id number]]  Review needed by CMO to determine Physician of Record: No

## 2024-07-04 NOTE — PLAN OF CARE
Goal Outcome Evaluation:                      Patient getting transferred to [address]. Report called to RN.

## 2024-07-04 NOTE — H&P
[facility] Medicine Services  History & Physical    Patient Name: Karly Zayas  : 1970  MRN: [id number]  Primary Care Physician:  Sandra Matias APRN  Date of admission: 2024  Date and Time of Service: 2024 at 0740    Subjective      Chief Complaint: left foot pain    History of Present Illness: Karly Zayas is a 53 y.o. female with a CMH of h/o CVA, seizures, T1DM, HLD and depression who presented to [facility] on 2024 with left foot pain. Patient reports that she tripped and fell due to ortho boot on RIGHT leg from recent fracture. She states that she landed face forward but her leg remained grounded on the floor. Denies LOC. She is not on blood thinners. However, immediately after, she had severe left foot  pain that has persisted. Also reports inability to bear weight on left foot. Denies seizure during fall. Of note, RIGHT ankle was recently fractured on 6/15 after she sustained a new onset seizure following a stroke on April while admitted for DKA.     On ED evaluation, Xray of left foot revealed displaced calcaneal fracture. Labs otherwise unremarkable. Orthopedic surgery consulted in ED, awaiting call on admission. Hospitalist service to admit for further management.        Review of Systems   Musculoskeletal:         Left Foot pain  Left Ankle pain         Personal History     Past Medical History:   Diagnosis Date    Anxiety     Autonomic neuropathy associated with type 1 diabetes mellitus     Bipolar 2 disorder     Cataracts, bilateral     Diabetes mellitus     TYPE 1    Diabetes mellitus type I     Diabetic retinopathy associated with type 1 diabetes mellitus     Disease of thyroid gland     DKA (diabetic ketoacidoses)     Foot fracture, right     High cholesterol     Kidney stone     Macular degeneration, bilateral     Neuropathy     Pelvic pain        Past Surgical History:   Procedure Laterality Date    ANKLE SURGERY Right       SECTION  2008    COLONOSCOPY N/A 8/18/2023    Procedure: COLONOSCOPY;  Surgeon: Lori Cleary MD;  Location: [facility] ENDOSCOPY;  Service: Gastroenterology;  Laterality: N/A;    DIAGNOSTIC LAPAROSCOPY  1991    DIAGNOSTIC LAPAROSCOPY N/A 11/1/2017    Procedure: LAPAROSCOPY, PARTIAL LEFT SALPINGECTOMY;  Surgeon: Arleen Quintana MD;  Location: [facility] OSC;  Service:     DILATATION AND CURETTAGE N/A 7/10/2018    Procedure: FROZEN DILATATION AND CURETTAGE POWER MORECELLATOR;  Surgeon: Nicole Shannon MD;  Location: [facility] OR;  Service: Gynecology    HYSTERECTOMY SUPRACERVICAL N/A 7/10/2018    Procedure: LAPAROSCOPIC SUPRACERVICAL HYSTERECTOMY WITH MORCELLATOR, LEFT SALPINGECTOMY;  Surgeon: Nicole Shannon MD;  Location: [facility] OR;  Service: Gynecology    HYSTEROSCOPY ENDOMETRIAL ABLATION  2009    WITH TUBAL AND RIGHT OVARY REMOVAL    TONSILLECTOMY  1983       Family History: family history is not on file. Otherwise pertinent FHx was reviewed and not pertinent to current issue.    Social History:  reports that she quit smoking about 7 years ago. Her smoking use included cigarettes. She started smoking about 32 years ago. She has been exposed to tobacco smoke. She has quit using smokeless tobacco. She reports that she does not drink alcohol and does not use drugs.    Home Medications:  Prior to Admission Medications       Prescriptions Last Dose Informant Patient Reported? Taking?    albuterol sulfate  (90 Base) MCG/ACT inhaler   Yes No    Inhale 2 puffs Every 4 (Four) Hours As Needed. Indications: Asthma    aspirin 325 MG tablet   No No    Take 1 tablet by mouth Daily.    atorvastatin (LIPITOR) 80 MG tablet   No No    Take 1 tablet by mouth Every Night.    citalopram (CeleXA) 20 MG tablet   Yes No    Take 1 tablet by mouth Daily. Indications: Major Depressive Disorder    Continuous Glucose Sensor (Dexcom G6 Sensor)   No No    Use Every 10 (Ten) Days.    Continuous Glucose Transmitter (Dexcom G6  Transmitter) misc   No No    Use 1 Device Continuous.    gabapentin (NEURONTIN) 600 MG tablet   Yes No    Take 2 tablets by mouth 3 (Three) Times a Day. Indications: Diabetes with Nerve Disease    HumaLOG 100 UNIT/ML injection   No No    USE WITH INSULIN PUMP WITH A MAX DAILY DOSE  UNITS    HYDROcodone-acetaminophen (NORCO) 7.5-325 MG per tablet   No No    Take 1 tablet by mouth Every 6 (Six) Hours As Needed for Moderate Pain.    Insulin Glargine (BASAGLAR KWIKPEN) 100 UNIT/ML injection pen   No No    Inject 19 units once daily. Use in case of pump failure.    Insulin Lispro (humaLOG) 100 UNIT/ML injection   No No    USE WITH INSULIN PUMP WITH A MAX DAILY DOSE  UNITS    metoclopramide (Reglan) 10 MG tablet   Yes No    Take 1 tablet by mouth 4 (Four) Times a Day. Indications: Delayed or Stopped Emptying of Stomach    naloxone (NARCAN) 4 MG/0.1ML nasal spray   No No    Call 911. Don't prime. [place] in 1 nostril for overdose. Repeat in 2-3 minutes in other nostril if no or minimal breathing/responsiveness.    nortriptyline (PAMELOR) 10 MG capsule   Yes No    Take 1 capsule by mouth Every Night. Indications: Depression    ondansetron ODT (ZOFRAN-ODT) 4 MG disintegrating tablet   Yes No    Place 1 tablet on the tongue Every 8 (Eight) Hours As Needed. Indications: Nausea and Vomiting    OXcarbazepine (TRILEPTAL) 300 MG tablet   Yes No    Take 1 tablet by mouth 3 times a day. Indications: Mood stabilizer    tiZANidine (ZANAFLEX) 4 MG tablet   Yes No    Take 1 tablet by mouth Every 8 (Eight) Hours As Needed for Muscle Spasms. Indications: Musculoskeletal Pain              Allergies:  Allergies   Allergen Reactions    Nitrofurantoin Monohyd Macro Other (See Comments)     severe reaction- hospitalized    Prednisone Swelling     high fever/ body aches       Objective      Vitals:   Temp:  [98.5 °F (36.9 °C)] 98.5 °F (36.9 °C)  Heart Rate:  [] 85  Resp:  [15] 15  BP: (163-191)/() 163/73  Body mass index  is 28.06 kg/m².      Physical Exam  General: 52 yo WF, Alert and oriented, well nourished, no acute distress.  HENT: Normocephalic, normal hearing, moist oral mucosa, no scleral icterus.  Neck: Supple, non-tender, no carotid bruits, no JVD, no LAD.  Lungs: Clear to auscultation, non-labored respiration.  Heart: RRR, no murmur, gallop or edema.  Abdomen: Soft, nontender, non-distended, + bowel sounds.  Musculoskeletal: LLE with palpable DP/PT pulses, NVI. Significant tenderness to L heel. Short leg splint on LLE. Ortho boot in place on RLE. Normal range of motion and strength, no tenderness or swelling.  Skin: Skin is warm, dry and pink, no rashes or lesions.  Psychiatric: Cooperative, appropriate mood and affect.      Diagnostic Data:  Lab Results (last 24 hours)       Procedure Component Value Units Date/Time    Basic Metabolic Panel [[id number]]  (Abnormal) Collected: 07/04/24 0600    Specimen: Blood Updated: 07/04/24 0633     Glucose 132 mg/dL      BUN 9 mg/dL      Creatinine 0.72 mg/dL      Sodium 138 mmol/L      Potassium 4.3 mmol/L      Comment: Specimen hemolyzed.  Result may be falsely elevated.        Chloride 103 mmol/L      CO2 23.9 mmol/L      Calcium 9.1 mg/dL      BUN/Creatinine Ratio 12.5     Anion Gap 11.1 mmol/L      eGFR 100.1 mL/min/1.73     Narrative:      GFR Normal >60  Chronic Kidney Disease <60  Kidney Failure <15      Protime-INR [[id number]]  (Normal) Collected: 07/04/24 0600    Specimen: Blood Updated: 07/04/24 0616     Protime 10.5 Seconds      INR 0.96    aPTT [[id number]]  (Abnormal) Collected: 07/04/24 0600    Specimen: Blood Updated: 07/04/24 0616     PTT 27.0 seconds     CBC & Differential [[id number]]  (Abnormal) Collected: 07/04/24 0600    Specimen: Blood Updated: 07/04/24 0605    Narrative:      The following orders were created for panel order CBC & Differential.  Procedure                               Abnormality         Status                     ---------                                -----------         ------                     CBC Auto Differential[[id number]]        Abnormal            Final result                 Please view results for these tests on the individual orders.    CBC Auto Differential [[id number]]  (Abnormal) Collected: 07/04/24 0600    Specimen: Blood Updated: 07/04/24 0605     WBC 7.89 10*3/mm3      RBC 4.10 10*6/mm3      Hemoglobin 11.1 g/dL      Hematocrit 36.5 %      MCV 89.0 fL      MCH 27.1 pg      MCHC 30.4 g/dL      RDW 13.0 %      RDW-SD 41.8 fl      MPV 9.5 fL      Platelets 471 10*3/mm3      Neutrophil % 56.4 %      Lymphocyte % 36.6 %      Monocyte % 5.3 %      Eosinophil % 0.0 %      Basophil % 1.3 %      Immature Grans % 0.4 %      Neutrophils, Absolute 4.45 10*3/mm3      Lymphocytes, Absolute 2.89 10*3/mm3      Monocytes, Absolute 0.42 10*3/mm3      Eosinophils, Absolute 0.00 10*3/mm3      Basophils, Absolute 0.10 10*3/mm3      Immature Grans, Absolute 0.03 10*3/mm3      nRBC 0.0 /100 WBC              Imaging Results (Last 24 Hours)       Procedure Component Value Units Date/Time    XR Foot 3+ View Left [[id number]] Collected: 07/04/24 0527     Updated: 07/04/24 0531    Narrative:      XR FOOT 3+ VW LEFT    Date of Exam: 7/4/2024 5:10 AM EDT    Indication: trauma    Comparison: Left foot 5/8/2024    Findings:  There is a displaced calcaneal fracture. The fragment is displaced 4 cm cephalad. There is deformity of the distal fifth metatarsal from previous fracture. The small avulsion injury from the distal fibula is better seen on the ankle images.      Impression:      Impression:  Displaced calcaneal fracture.        Electronically Signed: Faisal Sewell MD    7/4/2024 5:28 AM EDT    Workstation ID: [id number]    XR Ankle 3+ View Left [[id number]] Collected: 07/04/24 0525     Updated: 07/04/24 0529    Narrative:      XR ANKLE 3+ VW LEFT    Date of Exam: 7/4/2024 4:57 AM EDT    Indication: trauma    Comparison: Left ankle 5/8/2024    Findings:  There is a stable  small avulsed fracture from the distal fibula with overlying soft tissue swelling. There is a new displaced calcaneal fracture which is displaced 4 cm cephalad.      Impression:      Impression:  New displaced calcaneal fracture.        Electronically Signed: Faisal Sewell MD    7/4/2024 5:27 AM EDT    Workstation ID: [id number]              Assessment & Plan        This is a 53 y.o. female with:    Active and Resolved Problems  Active Hospital Problems    Diagnosis  POA    **Left calcaneal fracture [S92.002A]  Yes      Resolved Hospital Problems   No resolved problems to display.       Left calcaneal fracture 2/2 mechanical fall  CTH wo pending   XR L foot: displaced calcaneal fracture which is displaced 4 cm cephalad.  Short leg splint in place   Keep NPO, in case of surgical intervention  mIVFs  Prn pain medication  NWB to LLE  Ortho consulted- deferred to podiatry  No Podiatry in house today, spoke to [facility] podiatry (Dr Fulton) who accepted under attending- Dr. Lopez   Will be transferred to [facility] for podiatry services due to high risk of complications    Right ankle fracture   Previous fall 2/2 new onset seizure following recent CVA   6/15/24 XR R foot: Lateral malleolar soft tissue swelling and osseous densities at the distal fibular tip favored to reflect sequela of old injury although an acute chip or avulsion fracture of the distal fibular tip is not excluded   Ortho boot in place   Hold home oral pain medication as on IV pain meds 2/2 above     History of CVA, April 2024  No residual deficits noted  Holding ASA for now   Continue to monitor     Seizures  2/2 CVA  Continue Keppra and oxcarbazepine     Type 1 Diabetes Mellitus  POC BG q6h while NPO   Hold Lantus  Low SSI   Hypoglycemia protocol     Hypertension   BP stable  Resume home medications once reconciled     Hyperlipidemia  Continue statin, hold ASA for now     Depression  Continue citalopram           VTE Prophylaxis:  Mechanical VTE prophylaxis  orders are present.        The patient desires to be as follows:    CODE STATUS:    Code Status (Patient has no pulse and is not breathing): CPR (Attempt to Resuscitate)  Medical Interventions (Patient has pulse or is breathing): Full Support        Admission Status:  I believe this patient meets observation status.    Expected Length of Stay: < 24 hours    PDMP and Medication Dispenses via Sidebar reviewed and consistent with patient reported medications.    I discussed the patient's findings and my recommendations with patient.      Signature:     This document has been electronically signed by Harper Davis PA-C on July 4, 2024 08:06 EDT   [facility]

## 2024-07-04 NOTE — DISCHARGE SUMMARY
"             [facility] Medicine Services  Discharge Summary    Date of Service: 24  Patient Name: Karly Zayas  : 1970  MRN: [id number]    Date of Admission: 2024  Discharge Diagnosis: L calcaneal fracture   Date of Discharge:  24  Primary Care Physician: Sandra Matias APRN      Presenting Problem:   Left calcaneal fracture [S92.002A]    Active and Resolved Hospital Problems:  Active Hospital Problems    Diagnosis POA    **Left calcaneal fracture [S92.002A] Yes      Resolved Hospital Problems   No resolved problems to display.         Hospital Course     HPI:  Per the H&P written by Harper Davis PA-C, dated 24:  \"Karly Zayas is a 53 y.o. female with a CMH of h/o CVA, seizures, T1DM, HLD and depression who presented to [facility] on 2024 with left foot pain. Patient reports that she tripped and fell due to ortho boot on RIGHT leg from recent fracture. She states that she landed face forward but her leg remained grounded on the floor. Denies LOC. She is not on blood thinners. However, immediately after, she had severe left foot  pain that has persisted. Also reports inability to bear weight on left foot. Denies seizure during fall. Of note, RIGHT ankle was recently fractured on 6/15 after she sustained a new onset seizure following a stroke on April while admitted for DKA.      On ED evaluation, Xray of left foot revealed displaced calcaneal fracture. Labs otherwise unremarkable. Orthopedic surgery consulted in ED, awaiting call on admission. Hospitalist service to admit for further management.\"    Hospital Course:  Ortho consulted- deferred to podiatry  No Podiatry in house today, spoke to [facility] podiatry (Dr Fulton) who accepted under attending- Dr. Lopez   Will be transferred to [facility] for podiatry services due to high risk of complications           DISCHARGE Follow Up Recommendations for labs and diagnostics:       Reasons For Change In Medications " and Indications for New Medications:      Day of Discharge     Vital Signs:  Temp:  [97.9 °F (36.6 °C)-98.5 °F (36.9 °C)] 97.9 °F (36.6 °C)  Heart Rate:  [] 86  Resp:  [15-17] 17  BP: (143-191)/() 148/74  Flow (L/min):  [2] 2    Physical Exam:  Physical Exam     General: 54 yo WF, Alert and oriented, well nourished, no acute distress.  HENT: Normocephalic, normal hearing, moist oral mucosa, no scleral icterus.  Neck: Supple, non-tender, no carotid bruits, no JVD, no LAD.  Lungs: Clear to auscultation, non-labored respiration.  Heart: RRR, no murmur, gallop or edema.  Abdomen: Soft, nontender, non-distended, + bowel sounds.  Musculoskeletal: LLE with palpable DP/PT pulses, NVI. Significant tenderness to L heel. Short leg splint on LLE. Ortho boot in place on RLE. Normal range of motion and strength, no tenderness or swelling.  Skin: Skin is warm, dry and pink, no rashes or lesions.  Psychiatric: Cooperative, appropriate mood and affect.        Pertinent  and/or Most Recent Results     LAB RESULTS:      Lab 07/04/24  0600   WBC 7.89   HEMOGLOBIN 11.1*   HEMATOCRIT 36.5   PLATELETS 471*   NEUTROS ABS 4.45   IMMATURE GRANS (ABS) 0.03   LYMPHS ABS 2.89   MONOS ABS 0.42   EOS ABS 0.00   MCV 89.0   PROTIME 10.5   APTT 27.0*         Lab 07/04/24  0600   SODIUM 138   POTASSIUM 4.3   CHLORIDE 103   CO2 23.9   ANION GAP 11.1   BUN 9   CREATININE 0.72   EGFR 100.1   GLUCOSE 132*   CALCIUM 9.1             Lab 07/04/24  0600   PROTIME 10.5   INR 0.96                 Brief Urine Lab Results  (Last result in the past 365 days)        Color   Clarity   Blood   Leuk Est   Nitrite   Protein   CREAT   Urine HCG        04/21/24 1505 Dark Yellow  Comment: Result checked     Clear   Negative   Negative   Negative   Trace                 Microbiology Results (last 10 days)       ** No results found for the last 240 hours. **            CT Head Without Contrast    Result Date: 7/4/2024  Impression: Impression: 1. Negative for  acute intracranial hemorrhage or depressed skull fracture in the setting of reported trauma. 2. Minimal layering fluid in the left maxillary sinus. Correlate for clinical signs of sinusitis. Electronically Signed: Adam Dugan MD  7/4/2024 2:39 PM EDT  Workstation ID: [id number]    XR Foot 3+ View Left    Result Date: 7/4/2024  Impression: Impression: Displaced calcaneal fracture. Electronically Signed: Faisal Sewell MD  7/4/2024 5:28 AM EDT  Workstation ID: [id number]    XR Ankle 3+ View Left    Result Date: 7/4/2024  Impression: Impression: New displaced calcaneal fracture. Electronically Signed: Faisal Sewell MD  7/4/2024 5:27 AM EDT  Workstation ID: [id number]    XR Ankle 3+ View Right    Result Date: 6/15/2024  Impression: Impression: Lateral malleolar soft tissue swelling and osseous densities at the distal fibular tip favored to reflect sequela of old injury although an acute chip or avulsion fracture of the distal fibular tip is not excluded. Consider short-term radiographic follow-up if symptoms persist. Electronically Signed: Dallas Damon MD  6/15/2024 1:51 AM EDT  Workstation ID: [id number]             Results for orders placed during the hospital encounter of 04/18/24    Adult Transthoracic Echo Complete W/ Cont if Necessary Per Protocol    Interpretation Summary    Left ventricular ejection fraction appears to be 56 - 60%.    Estimated right ventricular systolic pressure from tricuspid regurgitation is normal (<35 mmHg).      Labs Pending at Discharge:      Procedures Performed           Consults:   Consults       Date and Time Order Name Status Description    7/4/2024 10:04 AM Inpatient Podiatry Consult      7/4/2024  5:44 AM IP Consult to Orthopedic Surgery      7/4/2024  5:43 AM Inpatient Hospitalist Consult                Discharge Details        Discharge Medications        Continue These Medications        Instructions Start Date   albuterol sulfate  (90 Base) MCG/ACT inhaler  Commonly known as:  PROVENTIL HFA;VENTOLIN HFA;PROAIR HFA   2 puffs, Inhalation, Every 4 Hours PRN      aspirin 81 MG EC tablet   81 mg, Oral, Daily      atorvastatin 20 MG tablet  Commonly known as: LIPITOR   20 mg, Oral, Daily      BASAGLAR KWIKPEN 100 UNIT/ML injection pen   Inject 19 units once daily. Use in case of pump failure.      citalopram 20 MG tablet  Commonly known as: CeleXA   1 tablet, Oral, Daily      gabapentin 600 MG tablet  Commonly known as: NEURONTIN   2 tablets, Oral, 3 Times Daily      HumaLOG 100 UNIT/ML injection  Generic drug: Insulin Lispro   USE WITH INSULIN PUMP WITH A MAX DAILY DOSE  UNITS      HYDROcodone-acetaminophen  MG per tablet  Commonly known as: NORCO   1 tablet, Oral, Every 6 Hours PRN      insulin patient supplied pump   Subcutaneous, Continuous, Humalog- Max daily dose of 100 units      levETIRAcetam 750 MG tablet  Commonly known as: KEPPRA   750 mg, Oral, 2 Times Daily      naloxone 4 MG/0.1ML nasal spray  Commonly known as: NARCAN   Call 911. Don't prime. [place] in 1 nostril for overdose. Repeat in 2-3 minutes in other nostril if no or minimal breathing/responsiveness.      nortriptyline 10 MG capsule  Commonly known as: PAMELOR   1 capsule, Oral, Nightly      ondansetron ODT 4 MG disintegrating tablet  Commonly known as: ZOFRAN-ODT   1 tablet, Translingual, Every 8 Hours PRN      OXcarbazepine 300 MG tablet  Commonly known as: TRILEPTAL   1 tablet, Oral, 3 times daily      pantoprazole 40 MG EC tablet  Commonly known as: PROTONIX   40 mg, Oral, Daily      Reglan 10 MG tablet  Generic drug: metoclopramide   10 mg, Oral, 4 Times Daily      tiZANidine 4 MG tablet  Commonly known as: ZANAFLEX   1 tablet, Oral, Every 8 Hours PRN               Allergies   Allergen Reactions    Nitrofurantoin Monohyd Macro Other (See Comments)     severe reaction- hospitalized    Prednisone Swelling     high fever/ body aches         Discharge Disposition: [facility]   Transfer to Another  Facility    Diet:  Hospital:  Diet Order   Procedures    NPO Diet NPO Type: Sips with Meds         Discharge Activity:         CODE STATUS:  Code Status and Medical Interventions:   Ordered at: 07/04/24 0806     Code Status (Patient has no pulse and is not breathing):    CPR (Attempt to Resuscitate)     Medical Interventions (Patient has pulse or is breathing):    Full Support         Future Appointments   Date Time Provider Department Center   9/25/2024  2:45 PM Nai Patton MD [facility]           Time spent on Discharge including face to face service:  >30 minutes    Signature: Electronically signed by Harper Davis PA-C, 07/04/24, 15:45 EDT.  [facility]ist Team

## 2024-07-24 DIAGNOSIS — E10.65 TYPE 1 DIABETES MELLITUS WITH HYPERGLYCEMIA: Primary | ICD-10-CM

## 2024-07-24 RX ORDER — PROCHLORPERAZINE 25 MG/1
1 SUPPOSITORY RECTAL
Qty: 1 EACH | Refills: 3 | Status: SHIPPED | OUTPATIENT
Start: 2024-07-24

## 2024-07-24 RX ORDER — PROCHLORPERAZINE 25 MG/1
SUPPOSITORY RECTAL
Qty: 9 EACH | Refills: 3 | Status: SHIPPED | OUTPATIENT
Start: 2024-07-24

## 2024-09-25 ENCOUNTER — LAB (OUTPATIENT)
Dept: LAB | Facility: HOSPITAL | Age: 54
End: 2024-09-25
Payer: COMMERCIAL

## 2024-09-25 ENCOUNTER — OFFICE VISIT (OUTPATIENT)
Dept: ENDOCRINOLOGY | Facility: CLINIC | Age: 54
End: 2024-09-25
Payer: COMMERCIAL

## 2024-09-25 VITALS
BODY MASS INDEX: 26.81 KG/M2 | SYSTOLIC BLOOD PRESSURE: 92 MMHG | WEIGHT: 181 LBS | OXYGEN SATURATION: 96 % | HEIGHT: 69 IN | DIASTOLIC BLOOD PRESSURE: 58 MMHG | HEART RATE: 80 BPM

## 2024-09-25 DIAGNOSIS — Z96.41 INSULIN PUMP STATUS: ICD-10-CM

## 2024-09-25 DIAGNOSIS — E55.9 VITAMIN D DEFICIENCY: ICD-10-CM

## 2024-09-25 DIAGNOSIS — E10.42 TYPE 1 DIABETES MELLITUS WITH DIABETIC POLYNEUROPATHY: Primary | ICD-10-CM

## 2024-09-25 DIAGNOSIS — E78.2 HYPERLIPIDEMIA, MIXED: ICD-10-CM

## 2024-09-25 PROCEDURE — 82306 VITAMIN D 25 HYDROXY: CPT | Performed by: INTERNAL MEDICINE

## 2024-09-25 PROCEDURE — 80053 COMPREHEN METABOLIC PANEL: CPT | Performed by: INTERNAL MEDICINE

## 2024-09-25 PROCEDURE — 36415 COLL VENOUS BLD VENIPUNCTURE: CPT | Performed by: INTERNAL MEDICINE

## 2024-09-25 PROCEDURE — 83036 HEMOGLOBIN GLYCOSYLATED A1C: CPT | Performed by: INTERNAL MEDICINE

## 2024-09-25 PROCEDURE — 99214 OFFICE O/P EST MOD 30 MIN: CPT | Performed by: INTERNAL MEDICINE

## 2024-09-25 RX ORDER — LACOSAMIDE 100 MG/1
100 TABLET ORAL
COMMUNITY
Start: 2024-09-10

## 2024-09-26 LAB
25(OH)D3 SERPL-MCNC: 20 NG/ML (ref 30–100)
ALBUMIN SERPL-MCNC: 3.9 G/DL (ref 3.5–5.2)
ALBUMIN/GLOB SERPL: 1.3 G/DL
ALP SERPL-CCNC: 157 U/L (ref 39–117)
ALT SERPL W P-5'-P-CCNC: 15 U/L (ref 1–33)
ANION GAP SERPL CALCULATED.3IONS-SCNC: 10 MMOL/L (ref 5–15)
AST SERPL-CCNC: 27 U/L (ref 1–32)
BILIRUB SERPL-MCNC: 0.2 MG/DL (ref 0–1.2)
BUN SERPL-MCNC: 14 MG/DL (ref 6–20)
BUN/CREAT SERPL: 15.6 (ref 7–25)
CALCIUM SPEC-SCNC: 9.5 MG/DL (ref 8.6–10.5)
CHLORIDE SERPL-SCNC: 101 MMOL/L (ref 98–107)
CO2 SERPL-SCNC: 27 MMOL/L (ref 22–29)
CREAT SERPL-MCNC: 0.9 MG/DL (ref 0.57–1)
EGFRCR SERPLBLD CKD-EPI 2021: 76.1 ML/MIN/1.73
GLOBULIN UR ELPH-MCNC: 2.9 GM/DL
GLUCOSE SERPL-MCNC: 90 MG/DL (ref 65–99)
HBA1C MFR BLD: 7.07 % (ref 4.8–5.6)
POTASSIUM SERPL-SCNC: 4 MMOL/L (ref 3.5–5.2)
PROT SERPL-MCNC: 6.8 G/DL (ref 6–8.5)
SODIUM SERPL-SCNC: 138 MMOL/L (ref 136–145)

## 2024-10-28 ENCOUNTER — TELEPHONE (OUTPATIENT)
Dept: ENDOCRINOLOGY | Facility: CLINIC | Age: 54
End: 2024-10-28
Payer: COMMERCIAL

## 2024-10-28 NOTE — TELEPHONE ENCOUNTER
Hub staff attempted to follow warm transfer process and was unsuccessful     Caller: Jose Luis Zayas    Relationship to patient: Emergency Contact    Best call back number: [phone]    Patient is needing: PT WONDERING IF WE HAVE ANY SAMPLES OF THE G6 SENSORS. PT HAD TO REMOVE HERS FOR AN MRI AND THEY'RE 5 DAYS AWAY FROM RECEIVING HER NEXT BATCH IN THE MAIL.

## 2024-11-15 ENCOUNTER — TELEPHONE (OUTPATIENT)
Dept: ENDOCRINOLOGY | Facility: CLINIC | Age: 54
End: 2024-11-15
Payer: COMMERCIAL

## 2024-11-19 ENCOUNTER — OFFICE (AMBULATORY)
Dept: URBAN - METROPOLITAN AREA CLINIC 64 | Facility: CLINIC | Age: 54
End: 2024-11-19
Payer: COMMERCIAL

## 2024-11-19 ENCOUNTER — OFFICE (AMBULATORY)
Age: 54
End: 2024-11-19
Payer: COMMERCIAL

## 2024-11-19 VITALS
HEART RATE: 86 BPM | WEIGHT: 179 LBS | DIASTOLIC BLOOD PRESSURE: 103 MMHG | HEIGHT: 69 IN | HEIGHT: 69 IN | WEIGHT: 179 LBS | DIASTOLIC BLOOD PRESSURE: 103 MMHG | SYSTOLIC BLOOD PRESSURE: 165 MMHG | HEIGHT: 69 IN | HEART RATE: 86 BPM | HEIGHT: 69 IN | SYSTOLIC BLOOD PRESSURE: 165 MMHG | HEART RATE: 86 BPM | HEART RATE: 86 BPM | SYSTOLIC BLOOD PRESSURE: 165 MMHG | WEIGHT: 179 LBS | SYSTOLIC BLOOD PRESSURE: 165 MMHG | SYSTOLIC BLOOD PRESSURE: 165 MMHG | HEIGHT: 69 IN | WEIGHT: 179 LBS | HEART RATE: 86 BPM | WEIGHT: 179 LBS | SYSTOLIC BLOOD PRESSURE: 165 MMHG | WEIGHT: 179 LBS | DIASTOLIC BLOOD PRESSURE: 103 MMHG | DIASTOLIC BLOOD PRESSURE: 103 MMHG | SYSTOLIC BLOOD PRESSURE: 165 MMHG | HEART RATE: 86 BPM | DIASTOLIC BLOOD PRESSURE: 103 MMHG | DIASTOLIC BLOOD PRESSURE: 103 MMHG | WEIGHT: 179 LBS | DIASTOLIC BLOOD PRESSURE: 103 MMHG | HEART RATE: 86 BPM | HEIGHT: 69 IN | HEIGHT: 69 IN

## 2024-11-19 DIAGNOSIS — K59.00 CONSTIPATION, UNSPECIFIED: ICD-10-CM

## 2024-11-19 DIAGNOSIS — K31.84 GASTROPARESIS: ICD-10-CM

## 2024-11-19 PROCEDURE — 99213 OFFICE O/P EST LOW 20 MIN: CPT | Performed by: INTERNAL MEDICINE

## 2024-11-19 RX ORDER — POLYETHYLENE GLYCOL 3350 17 G/17G
17 POWDER, FOR SOLUTION ORAL
Qty: 1 | Refills: 11 | Status: ACTIVE
Start: 2024-11-19

## 2024-11-19 RX ORDER — ONDANSETRON 4 MG/1
12 TABLET, ORALLY DISINTEGRATING ORAL
Qty: 90 | Refills: 3 | Status: ACTIVE
Start: 2024-11-19

## 2025-01-09 ENCOUNTER — TELEPHONE (OUTPATIENT)
Dept: ENDOCRINOLOGY | Facility: CLINIC | Age: 55
End: 2025-01-09
Payer: COMMERCIAL

## 2025-01-09 NOTE — TELEPHONE ENCOUNTER
Hub staff attempted to follow warm transfer process and was unsuccessful     Caller: Karly Zayas    Relationship to patient: Self    Best call back number: [phone]    Patient is needing: PATIENT LOST SETTINGS ON HER PUMP

## 2025-06-10 ENCOUNTER — OFFICE VISIT (OUTPATIENT)
Dept: ENDOCRINOLOGY | Facility: CLINIC | Age: 55
End: 2025-06-10
Payer: COMMERCIAL

## 2025-06-10 VITALS
HEART RATE: 86 BPM | HEIGHT: 69 IN | WEIGHT: 160 LBS | BODY MASS INDEX: 23.7 KG/M2 | OXYGEN SATURATION: 99 % | DIASTOLIC BLOOD PRESSURE: 70 MMHG | SYSTOLIC BLOOD PRESSURE: 130 MMHG

## 2025-06-10 DIAGNOSIS — E10.42 TYPE 1 DIABETES MELLITUS WITH DIABETIC POLYNEUROPATHY: Primary | ICD-10-CM

## 2025-06-10 DIAGNOSIS — E78.2 HYPERLIPIDEMIA, MIXED: ICD-10-CM

## 2025-06-10 DIAGNOSIS — E55.9 VITAMIN D DEFICIENCY: ICD-10-CM

## 2025-06-10 DIAGNOSIS — R94.6 ABNORMAL THYROID FUNCTION TEST: ICD-10-CM

## 2025-06-10 PROCEDURE — 99214 OFFICE O/P EST MOD 30 MIN: CPT | Performed by: INTERNAL MEDICINE

## 2025-06-10 RX ORDER — INSULIN GLARGINE 100 [IU]/ML
INJECTION, SOLUTION SUBCUTANEOUS
Start: 2025-06-10

## 2025-06-10 RX ORDER — OXYCODONE AND ACETAMINOPHEN 10; 325 MG/1; MG/1
1 TABLET ORAL
COMMUNITY
Start: 2025-05-14

## 2025-06-10 RX ORDER — LISINOPRIL 40 MG/1
40 TABLET ORAL DAILY
COMMUNITY

## 2025-06-10 NOTE — PATIENT INSTRUCTIONS
See eye doctor.  Adjust pump settings.  Continue atorvastatin & multivitamins.  Call if blood sugars are running under 100 or over 200.  F/u in 6 months, with labs prior.

## 2025-06-11 NOTE — PROGRESS NOTES
[facility]        Patient Care Team:  Sandra Matias APRN as PCP - General (Family Medicine)    Chief Complaint:    Chief Complaint   Patient presents with    Diabetes     FU / DM Type 2 /  Dexcom / Ate @ 6:00 a / Pump 12 a 1.7 / 3a 0.6 / 9:30 .6         Subjective   Here for diabetes f/u  Blood sugars: low 200's  Insulin delivery per pump: Basal 47%/ bolus 53%  16% auto mode  Got her pump replaced, but settings were not reentered as before  Exercise program: not much  Taking multivitamins    Interval History:     Patient Complaints: stressed w abn mammogram  Will have additional images done later this week  Patient Denies: severe hypoglycemia  History taken from: patient &     Review of Systems:   Review of Systems   Constitutional:  Positive for unexpected weight loss.   HENT:  Negative for trouble swallowing.    Eyes:  Negative for blurred vision.   Cardiovascular:  Negative for palpitations.   Gastrointestinal:  Negative for nausea.   Endocrine: Negative for polyuria.   Neurological:  Negative for tremors and headache.   Psychiatric/Behavioral:  Positive for stress. The patient is nervous/anxious.    Lost  21 lb since last visit    Objective     Vital Signs  Heart Rate:  [86] 86  BP: (130)/(70) 130/70    Physical Exam:     General Appearance:    Alert, cooperative, stressed   Head:    Normocephalic, without obvious abnormality, atraumatic   Eyes:            Lids and lashes normal, conjunctivae and sclerae normal, no   icterus, no pallor, corneas clear, PERRLA   Throat:   No oral lesions,  oral mucosa moist   Neck:   37 cm in circumference, thyroid firm, no carotid bruit   Lungs:     Clear    Heart:    Regular rhythm and normal rate   Chest Wall:    No abnormalities observed   Abdomen:     Normal bowel sounds, soft                 Extremities:   Toe nails need trimming, no edema               Pulses:   Pulses palpable and equal bilaterally   Skin:   Pump site clean    Neurologic:  DTR absent, able to feel the 10g monofilament          Results Review:    I have reviewed the patient's new clinical results, labs & imaging.    Medication Review:   Prior to Admission medications    Medication Sig Start Date End Date Taking? Authorizing Provider   albuterol sulfate  (90 Base) MCG/ACT inhaler Inhale 2 puffs Every 4 (Four) Hours As Needed. Indications: Asthma 3/13/22  Yes Daisy Cullen MD   aspirin 81 MG EC tablet Take 1 tablet by mouth Daily.   Yes Daisy Cullen MD   atorvastatin (LIPITOR) 20 MG tablet Take 1 tablet by mouth Daily.   Yes Daisy Cullen MD   citalopram (CeleXA) 20 MG tablet Take 1 tablet by mouth Daily. Indications: Major Depressive Disorder 1/31/23  Yes Daisy Cullen MD   Continuous Glucose Sensor (Dexcom G6 Sensor) Use Every 10 (Ten) Days. E10.65 7/24/24  Yes Nai Patton MD   Continuous Glucose Transmitter (Dexcom G6 Transmitter) misc Use 1 each Every 3 (Three) Months. E10.65 7/24/24  Yes Nai Patton MD   gabapentin (NEURONTIN) 600 MG tablet Take 2 tablets by mouth 3 (Three) Times a Day. Indications: Diabetes with Nerve Disease 12/28/21  Yes Daisy Cullen MD   HumaLOG 100 UNIT/ML injection USE WITH INSULIN PUMP WITH A MAX DAILY DOSE  UNITS 4/27/24  Yes Nai Patton MD   insulin patient supplied pump Inject  under the skin into the appropriate area as directed Continuous. Humalog- Max daily dose of 100 units   Yes Daisy Cullen MD   lacosamide (VIMPAT) 100 MG tablet tablet Take 1 tablet by mouth. 9/10/24  Yes Daisy Cullen MD   metoclopramide (Reglan) 10 MG tablet Take 1 tablet by mouth 4 (Four) Times a Day. Indications: Delayed or Stopped Emptying of Stomach 3/17/24  Yes Daisy Cullen MD   ondansetron ODT (ZOFRAN-ODT) 4 MG disintegrating tablet Place 1 tablet on the tongue Every 8 (Eight) Hours As Needed. Indications: Nausea and Vomiting 1/19/22  Yes Daisy Cullen  MD   OXcarbazepine (TRILEPTAL) 300 MG tablet Take 1 tablet by mouth 3 times a day. Indications: Mood stabilizer 1/31/23  Yes Daisy Cullen MD   oxyCODONE-acetaminophen (PERCOCET)  MG per tablet Take 1 tablet by mouth. 5/14/25  Yes Daisy Cullen MD   pantoprazole (PROTONIX) 40 MG EC tablet Take 1 tablet by mouth Daily.   Yes Daisy Cullen MD   tiZANidine (ZANAFLEX) 4 MG tablet Take 1 tablet by mouth Every 8 (Eight) Hours As Needed for Muscle Spasms. Indications: Musculoskeletal Pain 4/27/24  Yes Daisy Cullen MD   HYDROcodone-acetaminophen (NORCO)  MG per tablet Take 1 tablet by mouth Every 6 (Six) Hours As Needed for Moderate Pain.    Daisy Cullen MD   Insulin Glargine (BASAGLAR KWIKPEN) 100 UNIT/ML injection pen Inject 19 units once daily. Use in case of pump failure. 9/26/22   Nai Patton MD   lisinopril (PRINIVIL,ZESTRIL) 40 MG tablet Take 1 tablet by mouth Daily.    ProviderDaisy MD   nortriptyline (PAMELOR) 10 MG capsule Take 1 capsule by mouth Every Night. Indications: Depression 2/15/24   Daisy Cullen MD   diazePAM (VALIUM) 5 MG tablet Take 1 tablet by mouth Every 12 (Twelve) Hours As Needed for Anxiety. 5/12/25 6/10/25  Faisal Alvares MD         Lab Results   Component Value Date    HGBA1C 7.7 (H) 06/03/2025    HGBA1C 7.07 (H) 09/25/2024    HGBA1C 9.10 (H) 04/18/2024      Lab Results   Component Value Date    GLUCOSE 168 (H) 06/03/2025    BUN 30 (H) 06/03/2025    CREATININE 1.19 (H) 06/03/2025    EGFRIFNONA 83 01/19/2022    EGFRIFAFRI >60 03/22/2022    BCR 25 (H) 06/03/2025    K 4.8 06/03/2025    CO2 17 (L) 06/03/2025    CALCIUM 9.7 06/03/2025    ALBUMIN 4.1 06/03/2025    LABIL2 1.2 05/01/2024    AST 13 06/03/2025    ALT 7 06/03/2025    CHOL 189 04/20/2024    LDL 80 06/03/2025    HDL 49 06/03/2025    TRIG 177 (H) 06/03/2025     Lab Results   Component Value Date    TSH 0.416 (L) 06/03/2025    FREET4 1.30 07/27/2021    [id number]  41.7 06/03/2025     Microalb/cr ratio <4    Assessment & Plan     Diagnoses and all orders for this visit:    1. Type 1 diabetes mellitus with diabetic polyneuropathy (Primary) - not @ goal  -     Hemoglobin A1c; Future    2. Abnormal thyroid function test - new finding  -     TSH; Future  -     T4, Free; Future  -     T3, Free; Future  -     Thyroid Stimulating Immunoglobulin; Future  -     Thyroid Peroxidase Antibody; Future    3. Hyperlipidemia, mixed - improved  -     Comprehensive Metabolic Panel; Future    4. Vitamin D deficiency - improved    Wearing a T slim Control IQ insulin pump since Sept 2022.  Using DexCom  Pt has agreed to wear the CGM device and share readings on a regular basis with our office    See eye doctor.  See podiatrist. Given info on Dr. Cruz.  Adjust pump settings: basal rates 12a 0.9, 3a 0.6, 9:30a 0.8; correction factor 1:70, target 150  Continue atorvastatin & multivitamins.  Call if blood sugars are running under 100 or over 200.        Nai Patton MD  06/10/25  21:41 EDT

## 2025-07-15 DIAGNOSIS — E10.65 TYPE 1 DIABETES MELLITUS WITH HYPERGLYCEMIA: ICD-10-CM

## 2025-07-16 RX ORDER — INSULIN LISPRO 100 [IU]/ML
INJECTION, SOLUTION INTRAVENOUS; SUBCUTANEOUS
Qty: 90 ML | Refills: 3 | Status: SHIPPED | OUTPATIENT
Start: 2025-07-16

## 2025-08-18 DIAGNOSIS — E10.65 TYPE 1 DIABETES MELLITUS WITH HYPERGLYCEMIA: ICD-10-CM

## 2025-08-18 RX ORDER — PROCHLORPERAZINE 25 MG/1
SUPPOSITORY RECTAL
Qty: 1 EACH | Refills: 3 | Status: SHIPPED | OUTPATIENT
Start: 2025-08-18

## 2025-08-18 RX ORDER — PROCHLORPERAZINE 25 MG/1
SUPPOSITORY RECTAL
Qty: 9 EACH | Refills: 3 | Status: SHIPPED | OUTPATIENT
Start: 2025-08-18

## (undated) DEVICE — PK ENDO GI 50

## (undated) DEVICE — SYS CLS CARTR/THOMSN SG 10/12 AND 15MM

## (undated) DEVICE — UNDYED BRAIDED (POLYGLACTIN 910), SYNTHETIC ABSORBABLE SUTURE: Brand: COATED VICRYL

## (undated) DEVICE — CATH FOL SIMPLYLATEX SILELAST 16F 17IN

## (undated) DEVICE — ENDOPATH XCEL BLUNT TIP TROCARS WITH SMOOTH SLEEVES: Brand: ENDOPATH XCEL

## (undated) DEVICE — SOL NACL 0.9PCT 1000ML

## (undated) DEVICE — 2, DISPOSABLE SUCTION/IRRIGATOR WITH DISPOSABLE TIP: Brand: STRYKEFLOW

## (undated) DEVICE — ENDOPATH XCEL BLADELESS TROCARS WITH STABILITY SLEEVES: Brand: ENDOPATH XCEL

## (undated) DEVICE — DRSNG TELFA PAD NONADH STR 1S 3X4IN

## (undated) DEVICE — GLV SURG TRIUMPH CLASSIC PF LTX 7 STRL

## (undated) DEVICE — ENDOCUT SCISSOR TIP, DISPOSABLE: Brand: RENEW

## (undated) DEVICE — 3M™ STERI-STRIP™ REINFORCED ADHESIVE SKIN CLOSURES, R1547, 1/2 IN X 4 IN (12 MM X 100 MM), 6 STRIPS/ENVELOPE: Brand: 3M™ STERI-STRIP™

## (undated) DEVICE — HARMONIC ACE +7 LAPAROSCOPIC SHEARS ADVANCED HEMOSTASIS 5MM DIAMETER 36CM SHAFT LENGTH  FOR USE WITH GRAY HAND PIECE ONLY: Brand: HARMONIC ACE

## (undated) DEVICE — CONTAINER,SPECIMEN,OR STERILE,4OZ: Brand: MEDLINE

## (undated) DEVICE — GLV SURG BIOGEL LTX PF 6 1/2

## (undated) DEVICE — SUT VIC 0 CT1 36IN J946H

## (undated) DEVICE — SUT VIC 5/0 PS2 18IN J495H

## (undated) DEVICE — ENDOPATH PNEUMONEEDLE INSUFFLATION NEEDLES WITH LUER LOCK CONNECTORS 120MM: Brand: ENDOPATH

## (undated) DEVICE — 3M™ STERI-STRIP™ REINFORCED ADHESIVE SKIN CLOSURES, R1546, 1/4 IN X 4 IN (6 MM X 100 MM), 10 STRIPS/ENVELOPE: Brand: 3M™ STERI-STRIP™

## (undated) DEVICE — KTTNER ENDO BLNT DISSCT

## (undated) DEVICE — LOU LAVH: Brand: MEDLINE INDUSTRIES, INC.

## (undated) DEVICE — ANTIBACTERIAL UNDYED BRAIDED (POLYGLACTIN 910), SYNTHETIC ABSORBABLE SUTURE: Brand: COATED VICRYL

## (undated) DEVICE — VISUALIZATION SYSTEM: Brand: CLEARIFY

## (undated) DEVICE — GOWN,NON-REINFORCED,SIRUS,SET IN SLV,XL: Brand: MEDLINE

## (undated) DEVICE — ENDOPATH XCEL WITH OPTIVIEW TECHNOLOGY BLADELESS TROCARS WITH STABILITY SLEEVES: Brand: ENDOPATH XCEL OPTIVIEW

## (undated) DEVICE — ADHS SKIN DERMABOND TOP ADVANCED

## (undated) DEVICE — LAPAROSCOPIC SMOKE ELIMINATION DEVICE: Brand: PNEUVIEW XE

## (undated) DEVICE — CATH FOL SIMPLYLATEX SILELAST 18F 17IN

## (undated) DEVICE — ENDOPATH XCEL UNIVERSAL TROCAR STABLILITY SLEEVES: Brand: ENDOPATH XCEL

## (undated) DEVICE — VIOLET POLYDIOXANONE POLYMER, SYNTHETIC ABSORBABLE SUTURE CLIPS: Brand: LAPRA-TY

## (undated) DEVICE — PK LAP GYN TOWER 40